# Patient Record
Sex: MALE | Race: WHITE | NOT HISPANIC OR LATINO | Employment: FULL TIME | RURAL
[De-identification: names, ages, dates, MRNs, and addresses within clinical notes are randomized per-mention and may not be internally consistent; named-entity substitution may affect disease eponyms.]

---

## 2017-10-11 ENCOUNTER — HISTORICAL (OUTPATIENT)
Dept: ADMINISTRATIVE | Facility: HOSPITAL | Age: 52
End: 2017-10-11

## 2017-10-13 LAB
LAB AP CLINICAL INFORMATION: NORMAL
LAB AP COMMENTS: NORMAL
LAB AP DIAGNOSIS - HISTORICAL: NORMAL
LAB AP GROSS PATHOLOGY - HISTORICAL: NORMAL
LAB AP SPECIMEN SUBMITTED - HISTORICAL: NORMAL

## 2018-12-21 ENCOUNTER — HISTORICAL (OUTPATIENT)
Dept: ADMINISTRATIVE | Facility: HOSPITAL | Age: 53
End: 2018-12-21

## 2018-12-24 LAB
LAB AP CLINICAL INFORMATION: NORMAL
LAB AP DIAGNOSIS - HISTORICAL: NORMAL
LAB AP GROSS PATHOLOGY - HISTORICAL: NORMAL
LAB AP SPECIMEN SUBMITTED - HISTORICAL: NORMAL

## 2020-03-17 ENCOUNTER — HISTORICAL (OUTPATIENT)
Dept: ADMINISTRATIVE | Facility: HOSPITAL | Age: 55
End: 2020-03-17

## 2020-05-21 ENCOUNTER — HISTORICAL (OUTPATIENT)
Dept: ADMINISTRATIVE | Facility: HOSPITAL | Age: 55
End: 2020-05-21

## 2020-05-21 LAB
ABO: NORMAL
ANTIBODY SCREEN: NORMAL
BUN SERPL-MCNC: 13 MG/DL (ref 7–18)
CALCIUM SERPL-MCNC: 8.1 MG/DL (ref 8.5–10.1)
CHLORIDE SERPL-SCNC: 105 MMOL/L (ref 98–107)
CO2 SERPL-SCNC: 26 MMOL/L (ref 21–32)
CREAT SERPL-MCNC: 0.95 MG/DL (ref 0.7–1.3)
GLUCOSE SERPL-MCNC: 146 MG/DL (ref 74–106)
HCT VFR BLD AUTO: 35.9 % (ref 40–54)
HGB BLD-MCNC: 12.6 G/DL (ref 13.5–18)
MCHC RBC AUTO-ENTMCNC: 35.1 G/DL (ref 32–36)
POTASSIUM SERPL-SCNC: 4 MMOL/L (ref 3.5–5.1)
RH TYPE: NORMAL
SODIUM SERPL-SCNC: 138 MMOL/L (ref 136–145)

## 2020-05-22 ENCOUNTER — HISTORICAL (OUTPATIENT)
Dept: ADMINISTRATIVE | Facility: HOSPITAL | Age: 55
End: 2020-05-22

## 2020-05-22 LAB
BASOPHILS # BLD AUTO: 0.01 X10E3/UL (ref 0–0.2)
BASOPHILS NFR BLD AUTO: 0.1 % (ref 0–1)
BUN SERPL-MCNC: 12 MG/DL (ref 7–18)
CALCIUM SERPL-MCNC: 8 MG/DL (ref 8.5–10.1)
CHLORIDE SERPL-SCNC: 104 MMOL/L (ref 98–107)
CO2 SERPL-SCNC: 27 MMOL/L (ref 21–32)
CREAT SERPL-MCNC: 0.92 MG/DL (ref 0.7–1.3)
EOSINOPHIL # BLD AUTO: 0 X10E3/UL (ref 0–0.5)
EOSINOPHIL NFR BLD AUTO: 0 % (ref 1–4)
ERYTHROCYTE [DISTWIDTH] IN BLOOD BY AUTOMATED COUNT: 12.5 % (ref 11.5–14.5)
GLUCOSE SERPL-MCNC: 129 MG/DL (ref 74–106)
HCT VFR BLD AUTO: 36.9 % (ref 40–54)
HGB BLD-MCNC: 12.3 G/DL (ref 13.5–18)
IMM GRANULOCYTES # BLD AUTO: 0.04 X10E3/UL (ref 0–0.04)
IMM GRANULOCYTES NFR BLD: 0.3 % (ref 0–0.4)
LYMPHOCYTES # BLD AUTO: 0.89 X10E3/UL (ref 1–4.8)
LYMPHOCYTES NFR BLD AUTO: 7.6 % (ref 27–41)
MCH RBC QN AUTO: 31.9 PG (ref 27–31)
MCHC RBC AUTO-ENTMCNC: 33.3 G/DL (ref 32–36)
MCV RBC AUTO: 95.6 FL (ref 80–96)
MONOCYTES # BLD AUTO: 0.88 X10E3/UL (ref 0–0.8)
MONOCYTES NFR BLD AUTO: 7.5 % (ref 2–6)
MPC BLD CALC-MCNC: 10 FL (ref 9.4–12.4)
NEUTROPHILS # BLD AUTO: 9.88 X10E3/UL (ref 1.8–7.7)
NEUTROPHILS NFR BLD AUTO: 84.5 % (ref 53–65)
NRBC # BLD AUTO: 0 X10E3/UL (ref 0–0)
NRBC, AUTO (.00): 0 /100 (ref 0–0)
PLATELET # BLD AUTO: 202 X10E3/UL (ref 150–400)
POTASSIUM SERPL-SCNC: 4.2 MMOL/L (ref 3.5–5.1)
RBC # BLD AUTO: 3.86 X10E6/UL (ref 4.6–6.2)
SODIUM SERPL-SCNC: 138 MMOL/L (ref 136–145)
WBC # BLD AUTO: 11.7 X10E3/UL (ref 4.5–11)

## 2020-05-23 ENCOUNTER — HISTORICAL (OUTPATIENT)
Dept: ADMINISTRATIVE | Facility: HOSPITAL | Age: 55
End: 2020-05-23

## 2020-05-23 LAB
BASOPHILS # BLD AUTO: 0.04 X10E3/UL (ref 0–0.2)
BASOPHILS NFR BLD AUTO: 0.6 % (ref 0–1)
BUN SERPL-MCNC: 14 MG/DL (ref 7–18)
CALCIUM SERPL-MCNC: 7.9 MG/DL (ref 8.5–10.1)
CHLORIDE SERPL-SCNC: 104 MMOL/L (ref 98–107)
CO2 SERPL-SCNC: 28 MMOL/L (ref 21–32)
CREAT SERPL-MCNC: 0.82 MG/DL (ref 0.7–1.3)
EOSINOPHIL # BLD AUTO: 0.16 X10E3/UL (ref 0–0.5)
EOSINOPHIL NFR BLD AUTO: 2.3 % (ref 1–4)
ERYTHROCYTE [DISTWIDTH] IN BLOOD BY AUTOMATED COUNT: 12.8 % (ref 11.5–14.5)
GLUCOSE SERPL-MCNC: 114 MG/DL (ref 74–106)
HCT VFR BLD AUTO: 34.1 % (ref 40–54)
HGB BLD-MCNC: 11.1 G/DL (ref 13.5–18)
IMM GRANULOCYTES # BLD AUTO: 0.02 X10E3/UL (ref 0–0.04)
IMM GRANULOCYTES NFR BLD: 0.3 % (ref 0–0.4)
LYMPHOCYTES # BLD AUTO: 1.45 X10E3/UL (ref 1–4.8)
LYMPHOCYTES NFR BLD AUTO: 21.2 % (ref 27–41)
MCH RBC QN AUTO: 31.7 PG (ref 27–31)
MCHC RBC AUTO-ENTMCNC: 32.6 G/DL (ref 32–36)
MCV RBC AUTO: 97.4 FL (ref 80–96)
MONOCYTES # BLD AUTO: 0.79 X10E3/UL (ref 0–0.8)
MONOCYTES NFR BLD AUTO: 11.6 % (ref 2–6)
MPC BLD CALC-MCNC: 9.9 FL (ref 9.4–12.4)
NEUTROPHILS # BLD AUTO: 4.37 X10E3/UL (ref 1.8–7.7)
NEUTROPHILS NFR BLD AUTO: 64 % (ref 53–65)
NRBC # BLD AUTO: 0 X10E3/UL (ref 0–0)
NRBC, AUTO (.00): 0 /100 (ref 0–0)
PLATELET # BLD AUTO: 165 X10E3/UL (ref 150–400)
POTASSIUM SERPL-SCNC: 3.8 MMOL/L (ref 3.5–5.1)
RBC # BLD AUTO: 3.5 X10E6/UL (ref 4.6–6.2)
SODIUM SERPL-SCNC: 136 MMOL/L (ref 136–145)
WBC # BLD AUTO: 6.83 X10E3/UL (ref 4.5–11)

## 2020-07-13 ENCOUNTER — HISTORICAL (OUTPATIENT)
Dept: ADMINISTRATIVE | Facility: HOSPITAL | Age: 55
End: 2020-07-13

## 2020-09-04 ENCOUNTER — HISTORICAL (OUTPATIENT)
Dept: ADMINISTRATIVE | Facility: HOSPITAL | Age: 55
End: 2020-09-04

## 2020-10-05 ENCOUNTER — HISTORICAL (OUTPATIENT)
Dept: ADMINISTRATIVE | Facility: HOSPITAL | Age: 55
End: 2020-10-05

## 2020-10-05 LAB
ALBUMIN SERPL BCP-MCNC: 3.5 G/DL (ref 3.5–5)
ALBUMIN/GLOB SERPL: 1.2 {RATIO}
ALP SERPL-CCNC: 71 U/L (ref 45–115)
ALT SERPL W P-5'-P-CCNC: 29 U/L (ref 16–61)
ANION GAP SERPL CALCULATED.3IONS-SCNC: 6 MMOL/L (ref 7–16)
AST SERPL W P-5'-P-CCNC: 18 U/L (ref 15–37)
BASOPHILS # BLD AUTO: 0.07 X10E3/UL (ref 0–0.2)
BASOPHILS NFR BLD AUTO: 1.3 % (ref 0–1)
BILIRUB SERPL-MCNC: 0.4 MG/DL (ref 0–1.2)
BUN SERPL-MCNC: 13 MG/DL (ref 7–18)
BUN/CREAT SERPL: 16
CALCIUM SERPL-MCNC: 8.7 MG/DL (ref 8.5–10.1)
CHLORIDE SERPL-SCNC: 110 MMOL/L (ref 98–107)
CO2 SERPL-SCNC: 30 MMOL/L (ref 21–32)
CREAT SERPL-MCNC: 0.8 MG/DL (ref 0.7–1.3)
EOSINOPHIL # BLD AUTO: 0.62 X10E3/UL (ref 0–0.5)
EOSINOPHIL NFR BLD AUTO: 11.6 % (ref 1–4)
ERYTHROCYTE [DISTWIDTH] IN BLOOD BY AUTOMATED COUNT: 13.5 % (ref 11.5–14.5)
GLOBULIN SER-MCNC: 3 G/DL (ref 2–4)
GLUCOSE SERPL-MCNC: 94 MG/DL (ref 74–106)
HCT VFR BLD AUTO: 37.1 % (ref 40–54)
HGB BLD-MCNC: 12.6 G/DL (ref 13.5–18)
IMM GRANULOCYTES # BLD AUTO: 0.01 X10E3/UL (ref 0–0.04)
IMM GRANULOCYTES NFR BLD: 0.2 % (ref 0–0.4)
LYMPHOCYTES # BLD AUTO: 2.17 X10E3/UL (ref 1–4.8)
LYMPHOCYTES NFR BLD AUTO: 40.6 % (ref 27–41)
MCH RBC QN AUTO: 31.6 PG (ref 27–31)
MCHC RBC AUTO-ENTMCNC: 34 G/DL (ref 32–36)
MCV RBC AUTO: 93 FL (ref 80–96)
MONOCYTES # BLD AUTO: 0.69 X10E3/UL (ref 0–0.8)
MONOCYTES NFR BLD AUTO: 12.9 % (ref 2–6)
MPC BLD CALC-MCNC: 10.2 FL (ref 9.4–12.4)
NEUTROPHILS # BLD AUTO: 1.79 X10E3/UL (ref 1.8–7.7)
NEUTROPHILS NFR BLD AUTO: 33.4 % (ref 53–65)
NRBC # BLD AUTO: 0 X10E3/UL (ref 0–0)
NRBC, AUTO (.00): 0 /100 (ref 0–0)
PLATELET # BLD AUTO: 241 X10E3/UL (ref 150–400)
POTASSIUM SERPL-SCNC: 4.1 MMOL/L (ref 3.5–5.1)
PROT SERPL-MCNC: 6.5 G/DL (ref 6.4–8.2)
RBC # BLD AUTO: 3.99 X10E6/UL (ref 4.6–6.2)
SARS-COV+SARS-COV-2 AG RESP QL IA.RAPID: NEGATIVE
SODIUM SERPL-SCNC: 142 MMOL/L (ref 136–145)
WBC # BLD AUTO: 5.35 X10E3/UL (ref 4.5–11)

## 2020-10-23 ENCOUNTER — HISTORICAL (OUTPATIENT)
Dept: ADMINISTRATIVE | Facility: HOSPITAL | Age: 55
End: 2020-10-23

## 2021-01-12 ENCOUNTER — HISTORICAL (OUTPATIENT)
Dept: ADMINISTRATIVE | Facility: HOSPITAL | Age: 56
End: 2021-01-12

## 2021-04-27 DIAGNOSIS — I25.10 CORONARY ARTERY DISEASE INVOLVING NATIVE CORONARY ARTERY OF NATIVE HEART WITHOUT ANGINA PECTORIS: Primary | ICD-10-CM

## 2021-04-27 RX ORDER — BUTALBITAL, ACETAMINOPHEN AND CAFFEINE 50; 325; 40 MG/1; MG/1; MG/1
TABLET ORAL
Status: ON HOLD | COMMUNITY
Start: 2021-01-27 | End: 2021-10-05

## 2021-04-27 RX ORDER — RIVAROXABAN 20 MG/1
1 TABLET, FILM COATED ORAL DAILY
Qty: 90 TABLET | Refills: 1 | Status: SHIPPED | OUTPATIENT
Start: 2021-04-27 | End: 2021-08-18 | Stop reason: SDUPTHER

## 2021-04-27 RX ORDER — DEXTROAMPHETAMINE SACCHARATE, AMPHETAMINE ASPARTATE, DEXTROAMPHETAMINE SULFATE AND AMPHETAMINE SULFATE 5; 5; 5; 5 MG/1; MG/1; MG/1; MG/1
1 TABLET ORAL 2 TIMES DAILY
COMMUNITY
Start: 2021-03-29

## 2021-04-27 RX ORDER — ESOMEPRAZOLE MAGNESIUM 40 MG/1
CAPSULE, DELAYED RELEASE ORAL
COMMUNITY
Start: 2021-01-27 | End: 2021-08-18 | Stop reason: SDUPTHER

## 2021-04-27 RX ORDER — PREGABALIN 150 MG/1
CAPSULE ORAL
COMMUNITY
Start: 2021-03-11

## 2021-04-27 RX ORDER — RIVAROXABAN 20 MG/1
1 TABLET, FILM COATED ORAL DAILY
COMMUNITY
Start: 2021-02-02 | End: 2021-04-27 | Stop reason: SDUPTHER

## 2021-04-27 RX ORDER — LISINOPRIL 10 MG/1
TABLET ORAL
COMMUNITY
Start: 2021-01-27 | End: 2021-06-14 | Stop reason: SDUPTHER

## 2021-04-27 RX ORDER — HYDROCODONE BITARTRATE AND ACETAMINOPHEN 10; 325 MG/1; MG/1
TABLET ORAL
COMMUNITY
Start: 2021-04-20 | End: 2021-10-19

## 2021-04-27 RX ORDER — CLINDAMYCIN PHOSPHATE 10 MG/G
GEL TOPICAL
Status: ON HOLD | COMMUNITY
Start: 2021-04-19 | End: 2021-10-05

## 2021-04-27 RX ORDER — TIZANIDINE 4 MG/1
TABLET ORAL
COMMUNITY
Start: 2021-01-27 | End: 2022-10-20

## 2021-04-27 RX ORDER — BACLOFEN 10 MG/1
TABLET ORAL
COMMUNITY
Start: 2021-04-16

## 2021-04-27 RX ORDER — TADALAFIL 20 MG/1
TABLET ORAL
COMMUNITY
Start: 2021-03-08 | End: 2021-11-04 | Stop reason: SDUPTHER

## 2021-06-14 ENCOUNTER — HOSPITAL ENCOUNTER (OUTPATIENT)
Dept: RADIOLOGY | Facility: HOSPITAL | Age: 56
Discharge: HOME | End: 2021-06-14
Attending: ORTHOPAEDIC SURGERY
Payer: COMMERCIAL

## 2021-06-14 DIAGNOSIS — M25.552 BILATERAL HIP PAIN: ICD-10-CM

## 2021-06-14 DIAGNOSIS — M25.551 BILATERAL HIP PAIN: ICD-10-CM

## 2021-06-14 DIAGNOSIS — I10 HYPERTENSION, UNSPECIFIED TYPE: Primary | ICD-10-CM

## 2021-06-14 PROBLEM — M16.12 ARTHRITIS OF LEFT HIP: Status: ACTIVE | Noted: 2021-06-14

## 2021-06-14 PROBLEM — M16.11 ARTHRITIS OF RIGHT HIP: Status: ACTIVE | Noted: 2021-06-14

## 2021-06-14 PROCEDURE — 73521 XR HIPS BILATERAL 2 VIEW INCL AP PELVIS: ICD-10-PCS | Mod: 26,,, | Performed by: RADIOLOGY

## 2021-06-14 PROCEDURE — 73521 X-RAY EXAM HIPS BI 2 VIEWS: CPT | Mod: TC

## 2021-06-14 PROCEDURE — 73521 X-RAY EXAM HIPS BI 2 VIEWS: CPT | Mod: 26,,, | Performed by: RADIOLOGY

## 2021-06-14 RX ORDER — LISINOPRIL 10 MG/1
10 TABLET ORAL DAILY
Qty: 30 TABLET | Refills: 0 | Status: SHIPPED | OUTPATIENT
Start: 2021-06-14 | End: 2021-08-18 | Stop reason: SDUPTHER

## 2021-08-04 ENCOUNTER — OFFICE VISIT (OUTPATIENT)
Dept: SPINE | Facility: CLINIC | Age: 56
End: 2021-08-04
Payer: COMMERCIAL

## 2021-08-04 VITALS — WEIGHT: 205 LBS | HEIGHT: 69 IN | BODY MASS INDEX: 30.36 KG/M2

## 2021-08-04 DIAGNOSIS — M48.062 LUMBAR STENOSIS WITH NEUROGENIC CLAUDICATION: ICD-10-CM

## 2021-08-04 DIAGNOSIS — M54.16 LUMBAR RADICULOPATHY: ICD-10-CM

## 2021-08-04 DIAGNOSIS — M51.36 DDD (DEGENERATIVE DISC DISEASE), LUMBAR: ICD-10-CM

## 2021-08-04 DIAGNOSIS — M43.16 SPONDYLOLISTHESIS, LUMBAR REGION: Primary | ICD-10-CM

## 2021-08-04 DIAGNOSIS — M54.9 DORSALGIA, UNSPECIFIED: ICD-10-CM

## 2021-08-04 PROCEDURE — 99214 OFFICE O/P EST MOD 30 MIN: CPT | Mod: PBBFAC | Performed by: ORTHOPAEDIC SURGERY

## 2021-08-04 PROCEDURE — 99214 OFFICE O/P EST MOD 30 MIN: CPT | Mod: S$PBB,,, | Performed by: ORTHOPAEDIC SURGERY

## 2021-08-04 PROCEDURE — 99214 PR OFFICE/OUTPT VISIT, EST, LEVL IV, 30-39 MIN: ICD-10-PCS | Mod: S$PBB,,, | Performed by: ORTHOPAEDIC SURGERY

## 2021-08-06 ENCOUNTER — OFFICE VISIT (OUTPATIENT)
Dept: SPINE | Facility: CLINIC | Age: 56
End: 2021-08-06
Payer: COMMERCIAL

## 2021-08-06 DIAGNOSIS — M51.36 DDD (DEGENERATIVE DISC DISEASE), LUMBAR: ICD-10-CM

## 2021-08-06 DIAGNOSIS — M48.062 LUMBAR STENOSIS WITH NEUROGENIC CLAUDICATION: ICD-10-CM

## 2021-08-06 DIAGNOSIS — M54.16 LUMBAR RADICULOPATHY: ICD-10-CM

## 2021-08-06 DIAGNOSIS — M43.16 SPONDYLOLISTHESIS, LUMBAR REGION: Primary | ICD-10-CM

## 2021-08-06 PROCEDURE — 99213 OFFICE O/P EST LOW 20 MIN: CPT | Mod: PBBFAC | Performed by: ORTHOPAEDIC SURGERY

## 2021-08-06 PROCEDURE — 99214 OFFICE O/P EST MOD 30 MIN: CPT | Mod: S$PBB,,, | Performed by: ORTHOPAEDIC SURGERY

## 2021-08-06 PROCEDURE — 99214 PR OFFICE/OUTPT VISIT, EST, LEVL IV, 30-39 MIN: ICD-10-PCS | Mod: S$PBB,,, | Performed by: ORTHOPAEDIC SURGERY

## 2021-08-18 ENCOUNTER — OFFICE VISIT (OUTPATIENT)
Dept: FAMILY MEDICINE | Facility: CLINIC | Age: 56
End: 2021-08-18
Payer: COMMERCIAL

## 2021-08-18 VITALS
DIASTOLIC BLOOD PRESSURE: 85 MMHG | OXYGEN SATURATION: 96 % | HEIGHT: 69 IN | HEART RATE: 91 BPM | BODY MASS INDEX: 31.22 KG/M2 | WEIGHT: 210.81 LBS | RESPIRATION RATE: 18 BRPM | SYSTOLIC BLOOD PRESSURE: 147 MMHG

## 2021-08-18 DIAGNOSIS — I25.10 CORONARY ARTERY DISEASE INVOLVING NATIVE CORONARY ARTERY OF NATIVE HEART WITHOUT ANGINA PECTORIS: ICD-10-CM

## 2021-08-18 DIAGNOSIS — I10 HYPERTENSION, UNSPECIFIED TYPE: ICD-10-CM

## 2021-08-18 PROCEDURE — 99203 OFFICE O/P NEW LOW 30 MIN: CPT | Mod: ,,, | Performed by: INTERNAL MEDICINE

## 2021-08-18 PROCEDURE — 99203 PR OFFICE/OUTPT VISIT, NEW, LEVL III, 30-44 MIN: ICD-10-PCS | Mod: ,,, | Performed by: INTERNAL MEDICINE

## 2021-08-18 RX ORDER — FUROSEMIDE 20 MG/1
20 TABLET ORAL DAILY
COMMUNITY
End: 2021-08-18 | Stop reason: SDUPTHER

## 2021-08-24 DIAGNOSIS — M43.16 SPONDYLOLISTHESIS, LUMBAR REGION: Primary | ICD-10-CM

## 2021-08-24 RX ORDER — RIVAROXABAN 20 MG/1
20 TABLET, FILM COATED ORAL DAILY
Qty: 90 TABLET | Refills: 1 | Status: SHIPPED | OUTPATIENT
Start: 2021-08-24 | End: 2021-09-21

## 2021-08-24 RX ORDER — FUROSEMIDE 20 MG/1
20 TABLET ORAL DAILY
Qty: 90 TABLET | Refills: 1 | Status: SHIPPED | OUTPATIENT
Start: 2021-08-24 | End: 2022-01-24

## 2021-08-24 RX ORDER — ESOMEPRAZOLE MAGNESIUM 40 MG/1
40 CAPSULE, DELAYED RELEASE ORAL
Qty: 90 CAPSULE | Refills: 1 | Status: SHIPPED | OUTPATIENT
Start: 2021-08-24 | End: 2022-01-18

## 2021-08-24 RX ORDER — LISINOPRIL 10 MG/1
10 TABLET ORAL DAILY
Qty: 90 TABLET | Refills: 1 | Status: SHIPPED | OUTPATIENT
Start: 2021-08-24 | End: 2022-11-09

## 2021-08-24 RX ORDER — SODIUM CHLORIDE 9 MG/ML
INJECTION, SOLUTION INTRAVENOUS CONTINUOUS
Status: CANCELLED | OUTPATIENT
Start: 2021-08-24

## 2021-08-24 RX ORDER — CLINDAMYCIN HYDROCHLORIDE 150 MG/1
150 CAPSULE ORAL 3 TIMES DAILY
Qty: 21 CAPSULE | Refills: 1 | Status: ON HOLD | OUTPATIENT
Start: 2021-08-24 | End: 2021-10-05

## 2021-09-07 ENCOUNTER — OFFICE VISIT (OUTPATIENT)
Dept: CARDIOLOGY | Facility: CLINIC | Age: 56
End: 2021-09-07
Payer: COMMERCIAL

## 2021-09-07 VITALS
DIASTOLIC BLOOD PRESSURE: 60 MMHG | BODY MASS INDEX: 31.1 KG/M2 | WEIGHT: 210 LBS | HEIGHT: 69 IN | SYSTOLIC BLOOD PRESSURE: 104 MMHG | HEART RATE: 79 BPM | OXYGEN SATURATION: 97 %

## 2021-09-07 DIAGNOSIS — I10 HYPERTENSION, UNSPECIFIED TYPE: Primary | ICD-10-CM

## 2021-09-07 PROCEDURE — 93010 ELECTROCARDIOGRAM REPORT: CPT | Mod: S$PBB,,, | Performed by: INTERNAL MEDICINE

## 2021-09-07 PROCEDURE — 99214 PR OFFICE/OUTPT VISIT, EST, LEVL IV, 30-39 MIN: ICD-10-PCS | Mod: S$PBB,,, | Performed by: INTERNAL MEDICINE

## 2021-09-07 PROCEDURE — 99214 OFFICE O/P EST MOD 30 MIN: CPT | Mod: PBBFAC | Performed by: INTERNAL MEDICINE

## 2021-09-07 PROCEDURE — 99214 OFFICE O/P EST MOD 30 MIN: CPT | Mod: S$PBB,,, | Performed by: INTERNAL MEDICINE

## 2021-09-07 PROCEDURE — 93005 ELECTROCARDIOGRAM TRACING: CPT | Mod: PBBFAC | Performed by: INTERNAL MEDICINE

## 2021-09-07 PROCEDURE — 93010 EKG 12-LEAD: ICD-10-PCS | Mod: S$PBB,,, | Performed by: INTERNAL MEDICINE

## 2021-09-17 ENCOUNTER — OFFICE VISIT (OUTPATIENT)
Dept: DERMATOLOGY | Facility: CLINIC | Age: 56
End: 2021-09-17
Payer: COMMERCIAL

## 2021-09-17 VITALS — HEIGHT: 69 IN | BODY MASS INDEX: 31.1 KG/M2 | WEIGHT: 210 LBS | RESPIRATION RATE: 18 BRPM

## 2021-09-17 DIAGNOSIS — L01.00 IMPETIGO: ICD-10-CM

## 2021-09-17 DIAGNOSIS — F42.4 SKIN-PICKING DISORDER: ICD-10-CM

## 2021-09-17 DIAGNOSIS — D48.5 NEOPLASM OF UNCERTAIN BEHAVIOR OF SKIN: ICD-10-CM

## 2021-09-17 DIAGNOSIS — L28.1 PRURIGO NODULARIS: Primary | ICD-10-CM

## 2021-09-17 DIAGNOSIS — L30.8 ASTEATOTIC ECZEMA: ICD-10-CM

## 2021-09-17 DIAGNOSIS — L21.9 SEBORRHEIC DERMATITIS: ICD-10-CM

## 2021-09-17 PROCEDURE — 87077 CULTURE AEROBIC IDENTIFY: CPT | Mod: ,,, | Performed by: CLINICAL MEDICAL LABORATORY

## 2021-09-17 PROCEDURE — 87186 SC STD MICRODIL/AGAR DIL: CPT | Mod: ,,, | Performed by: CLINICAL MEDICAL LABORATORY

## 2021-09-17 PROCEDURE — 87070 CULTURE OTHR SPECIMN AEROBIC: CPT | Mod: ,,, | Performed by: CLINICAL MEDICAL LABORATORY

## 2021-09-17 PROCEDURE — 99204 PR OFFICE/OUTPT VISIT, NEW, LEVL IV, 45-59 MIN: ICD-10-PCS | Mod: ,,, | Performed by: STUDENT IN AN ORGANIZED HEALTH CARE EDUCATION/TRAINING PROGRAM

## 2021-09-17 PROCEDURE — 87186 CULTURE, WOUND: ICD-10-PCS | Mod: ,,, | Performed by: CLINICAL MEDICAL LABORATORY

## 2021-09-17 PROCEDURE — 87077 CULTURE, WOUND: ICD-10-PCS | Mod: ,,, | Performed by: CLINICAL MEDICAL LABORATORY

## 2021-09-17 PROCEDURE — 99204 OFFICE O/P NEW MOD 45 MIN: CPT | Mod: ,,, | Performed by: STUDENT IN AN ORGANIZED HEALTH CARE EDUCATION/TRAINING PROGRAM

## 2021-09-17 PROCEDURE — 87070 CULTURE, WOUND: ICD-10-PCS | Mod: ,,, | Performed by: CLINICAL MEDICAL LABORATORY

## 2021-09-17 RX ORDER — TRIAMCINOLONE ACETONIDE 1 MG/G
OINTMENT TOPICAL 2 TIMES DAILY
Qty: 80 G | Refills: 5 | Status: SHIPPED | OUTPATIENT
Start: 2021-09-17 | End: 2022-01-24 | Stop reason: SDUPTHER

## 2021-09-17 RX ORDER — KETOCONAZOLE 20 MG/G
CREAM TOPICAL DAILY
Qty: 30 G | Refills: 5 | Status: SHIPPED | OUTPATIENT
Start: 2021-09-17 | End: 2022-11-09

## 2021-09-17 RX ORDER — ACETYLCYSTEINE 600 MG
600 CAPSULE ORAL 3 TIMES DAILY
Qty: 90 CAPSULE | Refills: 5 | Status: ON HOLD | OUTPATIENT
Start: 2021-09-17 | End: 2021-10-05

## 2021-09-19 LAB — MICROORGANISM SPEC CULT: ABNORMAL

## 2021-09-21 ENCOUNTER — OFFICE VISIT (OUTPATIENT)
Dept: PULMONOLOGY | Facility: CLINIC | Age: 56
End: 2021-09-21
Payer: COMMERCIAL

## 2021-09-21 VITALS
WEIGHT: 210 LBS | OXYGEN SATURATION: 96 % | DIASTOLIC BLOOD PRESSURE: 74 MMHG | HEIGHT: 69 IN | BODY MASS INDEX: 31.1 KG/M2 | HEART RATE: 76 BPM | SYSTOLIC BLOOD PRESSURE: 138 MMHG | RESPIRATION RATE: 20 BRPM

## 2021-09-21 DIAGNOSIS — K21.9 GASTROESOPHAGEAL REFLUX DISEASE, UNSPECIFIED WHETHER ESOPHAGITIS PRESENT: ICD-10-CM

## 2021-09-21 DIAGNOSIS — I10 ESSENTIAL HYPERTENSION: ICD-10-CM

## 2021-09-21 DIAGNOSIS — Z01.818 PREOP EXAMINATION: Primary | ICD-10-CM

## 2021-09-21 DIAGNOSIS — I87.2 VENOUS INSUFFICIENCY: ICD-10-CM

## 2021-09-21 PROCEDURE — 99204 PR OFFICE/OUTPT VISIT, NEW, LEVL IV, 45-59 MIN: ICD-10-PCS | Mod: S$PBB,,, | Performed by: INTERNAL MEDICINE

## 2021-09-21 PROCEDURE — 99215 OFFICE O/P EST HI 40 MIN: CPT | Mod: PBBFAC | Performed by: INTERNAL MEDICINE

## 2021-09-21 PROCEDURE — 99204 OFFICE O/P NEW MOD 45 MIN: CPT | Mod: S$PBB,,, | Performed by: INTERNAL MEDICINE

## 2021-09-21 RX ORDER — RIFAMPIN 300 MG/1
300 CAPSULE ORAL EVERY 12 HOURS
Qty: 14 CAPSULE | Refills: 0 | Status: SHIPPED | OUTPATIENT
Start: 2021-09-21 | End: 2022-07-25 | Stop reason: SDUPTHER

## 2021-09-29 ENCOUNTER — OFFICE VISIT (OUTPATIENT)
Dept: SURGERY | Facility: CLINIC | Age: 56
End: 2021-09-29
Payer: COMMERCIAL

## 2021-09-29 VITALS — WEIGHT: 205 LBS | HEIGHT: 71 IN | BODY MASS INDEX: 28.7 KG/M2

## 2021-09-29 DIAGNOSIS — M54.50 LOW BACK PAIN, UNSPECIFIED BACK PAIN LATERALITY, UNSPECIFIED CHRONICITY, UNSPECIFIED WHETHER SCIATICA PRESENT: Primary | ICD-10-CM

## 2021-09-29 PROCEDURE — 99203 OFFICE O/P NEW LOW 30 MIN: CPT | Mod: S$PBB,,, | Performed by: SURGERY

## 2021-09-29 PROCEDURE — 99214 OFFICE O/P EST MOD 30 MIN: CPT | Mod: PBBFAC | Performed by: SURGERY

## 2021-09-29 PROCEDURE — 99203 PR OFFICE/OUTPT VISIT, NEW, LEVL III, 30-44 MIN: ICD-10-PCS | Mod: S$PBB,,, | Performed by: SURGERY

## 2021-09-30 DIAGNOSIS — M54.9 DORSALGIA, UNSPECIFIED: ICD-10-CM

## 2021-10-04 ENCOUNTER — OFFICE VISIT (OUTPATIENT)
Dept: SPINE | Facility: CLINIC | Age: 56
End: 2021-10-04
Payer: COMMERCIAL

## 2021-10-04 DIAGNOSIS — M43.16 SPONDYLOLISTHESIS, LUMBAR REGION: Primary | ICD-10-CM

## 2021-10-04 DIAGNOSIS — M51.36 DDD (DEGENERATIVE DISC DISEASE), LUMBAR: ICD-10-CM

## 2021-10-04 DIAGNOSIS — M54.16 LUMBAR RADICULOPATHY: ICD-10-CM

## 2021-10-04 PROCEDURE — 99213 OFFICE O/P EST LOW 20 MIN: CPT | Mod: PBBFAC | Performed by: ORTHOPAEDIC SURGERY

## 2021-10-04 PROCEDURE — 85730 THROMBOPLASTIN TIME PARTIAL: CPT | Performed by: ORTHOPAEDIC SURGERY

## 2021-10-04 PROCEDURE — 99215 OFFICE O/P EST HI 40 MIN: CPT | Mod: 57,S$PBB,, | Performed by: ORTHOPAEDIC SURGERY

## 2021-10-04 PROCEDURE — 99215 PR OFFICE/OUTPT VISIT, EST, LEVL V, 40-54 MIN: ICD-10-PCS | Mod: 57,S$PBB,, | Performed by: ORTHOPAEDIC SURGERY

## 2021-10-04 PROCEDURE — 85610 PROTHROMBIN TIME: CPT | Mod: 91 | Performed by: ORTHOPAEDIC SURGERY

## 2021-10-04 PROCEDURE — 85610 PROTHROMBIN TIME: CPT | Performed by: ORTHOPAEDIC SURGERY

## 2021-10-04 RX ORDER — CYCLOBENZAPRINE HCL 5 MG
5 TABLET ORAL 3 TIMES DAILY PRN
Qty: 45 TABLET | Refills: 0 | Status: SHIPPED | OUTPATIENT
Start: 2021-10-04 | End: 2021-10-14

## 2021-10-04 RX ORDER — OXYCODONE AND ACETAMINOPHEN 5; 325 MG/1; MG/1
1 TABLET ORAL EVERY 4 HOURS PRN
Qty: 45 TABLET | Refills: 0 | Status: SHIPPED | OUTPATIENT
Start: 2021-10-04 | End: 2021-10-19

## 2021-10-04 RX ORDER — PROMETHAZINE HYDROCHLORIDE 25 MG/1
25 TABLET ORAL EVERY 4 HOURS
Qty: 45 TABLET | Refills: 0 | Status: SHIPPED | OUTPATIENT
Start: 2021-10-04 | End: 2022-11-09

## 2021-10-05 ENCOUNTER — ANESTHESIA EVENT (OUTPATIENT)
Dept: SURGERY | Facility: HOSPITAL | Age: 56
DRG: 460 | End: 2021-10-05
Payer: COMMERCIAL

## 2021-10-05 ENCOUNTER — ANESTHESIA (OUTPATIENT)
Dept: SURGERY | Facility: HOSPITAL | Age: 56
DRG: 460 | End: 2021-10-05
Payer: COMMERCIAL

## 2021-10-05 ENCOUNTER — HOSPITAL ENCOUNTER (INPATIENT)
Facility: HOSPITAL | Age: 56
LOS: 1 days | Discharge: HOME | DRG: 460 | End: 2021-10-06
Attending: ORTHOPAEDIC SURGERY | Admitting: ORTHOPAEDIC SURGERY
Payer: COMMERCIAL

## 2021-10-05 DIAGNOSIS — M43.16 SPONDYLOLISTHESIS, LUMBAR REGION: ICD-10-CM

## 2021-10-05 PROBLEM — Z98.1 S/P LUMBAR FUSION: Status: ACTIVE | Noted: 2021-10-05

## 2021-10-05 PROBLEM — I73.9 PVD (PERIPHERAL VASCULAR DISEASE): Chronic | Status: ACTIVE | Noted: 2021-10-05

## 2021-10-05 PROCEDURE — 71000033 HC RECOVERY, INTIAL HOUR: Performed by: ORTHOPAEDIC SURGERY

## 2021-10-05 PROCEDURE — 99222 PR INITIAL HOSPITAL CARE,LEVL II: ICD-10-PCS | Mod: ,,, | Performed by: HOSPITALIST

## 2021-10-05 PROCEDURE — 27000509 HC TUBE SALEM SUMP ANY SIZE: Performed by: ANESTHESIOLOGY

## 2021-10-05 PROCEDURE — 27000221 HC OXYGEN, UP TO 24 HOURS

## 2021-10-05 PROCEDURE — 63600175 PHARM REV CODE 636 W HCPCS: Performed by: ANESTHESIOLOGY

## 2021-10-05 PROCEDURE — 64488 PERIPHERAL BLOCK: ICD-10-PCS | Mod: XU,,, | Performed by: ANESTHESIOLOGY

## 2021-10-05 PROCEDURE — 97161 PT EVAL LOW COMPLEX 20 MIN: CPT

## 2021-10-05 PROCEDURE — 37000009 HC ANESTHESIA EA ADD 15 MINS: Performed by: ORTHOPAEDIC SURGERY

## 2021-10-05 PROCEDURE — 99900035 HC TECH TIME PER 15 MIN (STAT)

## 2021-10-05 PROCEDURE — D9220A PRA ANESTHESIA: ICD-10-PCS | Mod: CRNA,,,

## 2021-10-05 PROCEDURE — 20930 SP BONE ALGRFT MORSEL ADD-ON: CPT | Mod: ,,, | Performed by: ORTHOPAEDIC SURGERY

## 2021-10-05 PROCEDURE — C1713 ANCHOR/SCREW BN/BN,TIS/BN: HCPCS | Performed by: ORTHOPAEDIC SURGERY

## 2021-10-05 PROCEDURE — 22853 PR INSERT BIOMECH DEV W/INTERBODY ARTHRODESIS, EA CONTIGUOUS DEFECT: ICD-10-PCS | Mod: ,,, | Performed by: ORTHOPAEDIC SURGERY

## 2021-10-05 PROCEDURE — C1729 CATH, DRAINAGE: HCPCS | Performed by: ORTHOPAEDIC SURGERY

## 2021-10-05 PROCEDURE — 36000711: Performed by: ORTHOPAEDIC SURGERY

## 2021-10-05 PROCEDURE — 99222 1ST HOSP IP/OBS MODERATE 55: CPT | Mod: ,,, | Performed by: HOSPITALIST

## 2021-10-05 PROCEDURE — 27000260 *HC AIRWAY ORAL: Performed by: ANESTHESIOLOGY

## 2021-10-05 PROCEDURE — D9220A PRA ANESTHESIA: Mod: CRNA,,,

## 2021-10-05 PROCEDURE — 22558 PR ARTHRODESIS ANT INTERBODY MIN DISCECTOMY,LUMBAR: ICD-10-PCS | Mod: 62,,, | Performed by: ORTHOPAEDIC SURGERY

## 2021-10-05 PROCEDURE — 22853 INSJ BIOMECHANICAL DEVICE: CPT | Mod: ,,, | Performed by: ORTHOPAEDIC SURGERY

## 2021-10-05 PROCEDURE — D9220A PRA ANESTHESIA: ICD-10-PCS | Mod: ANES,,, | Performed by: ANESTHESIOLOGY

## 2021-10-05 PROCEDURE — 20930 PR ALLOGRAFT FOR SPINE SURGERY ONLY MORSELIZED: ICD-10-PCS | Mod: ,,, | Performed by: ORTHOPAEDIC SURGERY

## 2021-10-05 PROCEDURE — 63600175 PHARM REV CODE 636 W HCPCS: Performed by: ORTHOPAEDIC SURGERY

## 2021-10-05 PROCEDURE — 63600175 PHARM REV CODE 636 W HCPCS

## 2021-10-05 PROCEDURE — 11000001 HC ACUTE MED/SURG PRIVATE ROOM

## 2021-10-05 PROCEDURE — 22558 ARTHRD ANT NTRBD MIN DSC LUM: CPT | Mod: 62,,, | Performed by: ORTHOPAEDIC SURGERY

## 2021-10-05 PROCEDURE — 25000003 PHARM REV CODE 250: Performed by: ORTHOPAEDIC SURGERY

## 2021-10-05 PROCEDURE — 27000689 HC BLADE LARYNGOSCOPE ANY SIZE: Performed by: ANESTHESIOLOGY

## 2021-10-05 PROCEDURE — D9220A PRA ANESTHESIA: Mod: ANES,,, | Performed by: ANESTHESIOLOGY

## 2021-10-05 PROCEDURE — 27000655: Performed by: ANESTHESIOLOGY

## 2021-10-05 PROCEDURE — 94761 N-INVAS EAR/PLS OXIMETRY MLT: CPT

## 2021-10-05 PROCEDURE — 25000003 PHARM REV CODE 250

## 2021-10-05 PROCEDURE — 27000165 HC TUBE, ETT CUFFED: Performed by: ANESTHESIOLOGY

## 2021-10-05 PROCEDURE — 22558 PR ARTHRODESIS ANT INTERBODY MIN DISCECTOMY,LUMBAR: ICD-10-PCS | Mod: 62,,, | Performed by: SURGERY

## 2021-10-05 PROCEDURE — 27100168 OPTIME MED/SURG SUP & DEVICES NON-STERILE SUPPLY: Performed by: ORTHOPAEDIC SURGERY

## 2021-10-05 PROCEDURE — 27201423 OPTIME MED/SURG SUP & DEVICES STERILE SUPPLY: Performed by: ORTHOPAEDIC SURGERY

## 2021-10-05 PROCEDURE — 27000716 HC OXISENSOR PROBE, ANY SIZE: Performed by: ANESTHESIOLOGY

## 2021-10-05 PROCEDURE — 22558 ARTHRD ANT NTRBD MIN DSC LUM: CPT | Mod: 62,,, | Performed by: SURGERY

## 2021-10-05 PROCEDURE — 37000008 HC ANESTHESIA 1ST 15 MINUTES: Performed by: ORTHOPAEDIC SURGERY

## 2021-10-05 PROCEDURE — 36000710: Performed by: ORTHOPAEDIC SURGERY

## 2021-10-05 PROCEDURE — 64488 TAP BLOCK BI INJECTION: CPT | Performed by: ANESTHESIOLOGY

## 2021-10-05 DEVICE — IMPLANTABLE DEVICE: Type: IMPLANTABLE DEVICE | Site: BACK | Status: FUNCTIONAL

## 2021-10-05 RX ORDER — SODIUM CHLORIDE 9 MG/ML
INJECTION, SOLUTION INTRAVENOUS CONTINUOUS
Status: DISCONTINUED | OUTPATIENT
Start: 2021-10-05 | End: 2021-10-05

## 2021-10-05 RX ORDER — DOCUSATE SODIUM 100 MG/1
100 CAPSULE, LIQUID FILLED ORAL 2 TIMES DAILY
Status: DISCONTINUED | OUTPATIENT
Start: 2021-10-05 | End: 2021-10-06 | Stop reason: HOSPADM

## 2021-10-05 RX ORDER — FENTANYL CITRATE 50 UG/ML
INJECTION, SOLUTION INTRAMUSCULAR; INTRAVENOUS
Status: DISCONTINUED | OUTPATIENT
Start: 2021-10-05 | End: 2021-10-05

## 2021-10-05 RX ORDER — LIDOCAINE HYDROCHLORIDE 20 MG/ML
INJECTION, SOLUTION EPIDURAL; INFILTRATION; INTRACAUDAL; PERINEURAL
Status: DISCONTINUED | OUTPATIENT
Start: 2021-10-05 | End: 2021-10-05

## 2021-10-05 RX ORDER — DEXAMETHASONE SODIUM PHOSPHATE 4 MG/ML
INJECTION, SOLUTION INTRA-ARTICULAR; INTRALESIONAL; INTRAMUSCULAR; INTRAVENOUS; SOFT TISSUE
Status: DISCONTINUED | OUTPATIENT
Start: 2021-10-05 | End: 2021-10-05

## 2021-10-05 RX ORDER — MORPHINE SULFATE 10 MG/ML
4 INJECTION INTRAMUSCULAR; INTRAVENOUS; SUBCUTANEOUS EVERY 5 MIN PRN
Status: DISCONTINUED | OUTPATIENT
Start: 2021-10-05 | End: 2021-10-05 | Stop reason: HOSPADM

## 2021-10-05 RX ORDER — OXYCODONE HYDROCHLORIDE 5 MG/1
5 TABLET ORAL EVERY 4 HOURS PRN
Status: DISCONTINUED | OUTPATIENT
Start: 2021-10-05 | End: 2021-10-06 | Stop reason: HOSPADM

## 2021-10-05 RX ORDER — HYDROMORPHONE HYDROCHLORIDE 2 MG/ML
0.5 INJECTION, SOLUTION INTRAMUSCULAR; INTRAVENOUS; SUBCUTANEOUS EVERY 5 MIN PRN
Status: DISCONTINUED | OUTPATIENT
Start: 2021-10-05 | End: 2021-10-05 | Stop reason: HOSPADM

## 2021-10-05 RX ORDER — MEPERIDINE HYDROCHLORIDE 25 MG/ML
25 INJECTION INTRAMUSCULAR; INTRAVENOUS; SUBCUTANEOUS EVERY 10 MIN PRN
Status: DISCONTINUED | OUTPATIENT
Start: 2021-10-05 | End: 2021-10-05 | Stop reason: HOSPADM

## 2021-10-05 RX ORDER — TRANEXAMIC ACID 100 MG/ML
INJECTION, SOLUTION INTRAVENOUS
Status: DISCONTINUED | OUTPATIENT
Start: 2021-10-05 | End: 2021-10-05

## 2021-10-05 RX ORDER — CEFAZOLIN SODIUM 1 G/3ML
INJECTION, POWDER, FOR SOLUTION INTRAMUSCULAR; INTRAVENOUS
Status: DISCONTINUED | OUTPATIENT
Start: 2021-10-05 | End: 2021-10-05

## 2021-10-05 RX ORDER — PANTOPRAZOLE SODIUM 40 MG/1
40 TABLET, DELAYED RELEASE ORAL DAILY
Status: DISCONTINUED | OUTPATIENT
Start: 2021-10-05 | End: 2021-10-06 | Stop reason: HOSPADM

## 2021-10-05 RX ORDER — BACLOFEN 10 MG/1
10 TABLET ORAL 3 TIMES DAILY
Status: DISCONTINUED | OUTPATIENT
Start: 2021-10-05 | End: 2021-10-06 | Stop reason: HOSPADM

## 2021-10-05 RX ORDER — AMOXICILLIN 250 MG
1 CAPSULE ORAL 2 TIMES DAILY
Status: DISCONTINUED | OUTPATIENT
Start: 2021-10-05 | End: 2021-10-06 | Stop reason: HOSPADM

## 2021-10-05 RX ORDER — EPINEPHRINE 1 MG/ML
INJECTION, SOLUTION INTRACARDIAC; INTRAMUSCULAR; INTRAVENOUS; SUBCUTANEOUS
Status: DISCONTINUED | OUTPATIENT
Start: 2021-10-05 | End: 2021-10-05 | Stop reason: HOSPADM

## 2021-10-05 RX ORDER — METOCLOPRAMIDE HYDROCHLORIDE 5 MG/ML
5 INJECTION INTRAMUSCULAR; INTRAVENOUS EVERY 6 HOURS PRN
Status: DISCONTINUED | OUTPATIENT
Start: 2021-10-05 | End: 2021-10-06 | Stop reason: HOSPADM

## 2021-10-05 RX ORDER — VANCOMYCIN HYDROCHLORIDE 1 G/20ML
INJECTION, POWDER, LYOPHILIZED, FOR SOLUTION INTRAVENOUS
Status: DISCONTINUED | OUTPATIENT
Start: 2021-10-05 | End: 2021-10-05 | Stop reason: HOSPADM

## 2021-10-05 RX ORDER — PROPOFOL 10 MG/ML
VIAL (ML) INTRAVENOUS
Status: DISCONTINUED | OUTPATIENT
Start: 2021-10-05 | End: 2021-10-05

## 2021-10-05 RX ORDER — SODIUM CHLORIDE 0.9 % (FLUSH) 0.9 %
10 SYRINGE (ML) INJECTION
Status: DISCONTINUED | OUTPATIENT
Start: 2021-10-05 | End: 2021-10-06 | Stop reason: HOSPADM

## 2021-10-05 RX ORDER — OXYCODONE HCL 10 MG/1
10 TABLET, FILM COATED, EXTENDED RELEASE ORAL ONCE
Status: COMPLETED | OUTPATIENT
Start: 2021-10-05 | End: 2021-10-05

## 2021-10-05 RX ORDER — ACETAMINOPHEN 325 MG/1
650 TABLET ORAL EVERY 4 HOURS PRN
Status: DISCONTINUED | OUTPATIENT
Start: 2021-10-05 | End: 2021-10-06 | Stop reason: HOSPADM

## 2021-10-05 RX ORDER — CELECOXIB 100 MG/1
200 CAPSULE ORAL ONCE
Status: COMPLETED | OUTPATIENT
Start: 2021-10-05 | End: 2021-10-05

## 2021-10-05 RX ORDER — DIPHENHYDRAMINE HYDROCHLORIDE 50 MG/ML
25 INJECTION INTRAMUSCULAR; INTRAVENOUS EVERY 6 HOURS PRN
Status: DISCONTINUED | OUTPATIENT
Start: 2021-10-05 | End: 2021-10-05 | Stop reason: HOSPADM

## 2021-10-05 RX ORDER — ONDANSETRON 2 MG/ML
4 INJECTION INTRAMUSCULAR; INTRAVENOUS DAILY PRN
Status: DISCONTINUED | OUTPATIENT
Start: 2021-10-05 | End: 2021-10-05 | Stop reason: HOSPADM

## 2021-10-05 RX ORDER — GLYCOPYRROLATE 0.2 MG/ML
INJECTION INTRAMUSCULAR; INTRAVENOUS
Status: DISCONTINUED | OUTPATIENT
Start: 2021-10-05 | End: 2021-10-05

## 2021-10-05 RX ORDER — ONDANSETRON 2 MG/ML
4 INJECTION INTRAMUSCULAR; INTRAVENOUS EVERY 12 HOURS PRN
Status: DISCONTINUED | OUTPATIENT
Start: 2021-10-05 | End: 2021-10-06 | Stop reason: HOSPADM

## 2021-10-05 RX ORDER — PREGABALIN 75 MG/1
150 CAPSULE ORAL 2 TIMES DAILY
Status: DISCONTINUED | OUTPATIENT
Start: 2021-10-05 | End: 2021-10-06 | Stop reason: HOSPADM

## 2021-10-05 RX ORDER — CEFAZOLIN SODIUM 2 G/50ML
2 SOLUTION INTRAVENOUS
Status: DISCONTINUED | OUTPATIENT
Start: 2021-10-05 | End: 2021-10-05 | Stop reason: HOSPADM

## 2021-10-05 RX ORDER — LISINOPRIL 10 MG/1
10 TABLET ORAL DAILY
Status: DISCONTINUED | OUTPATIENT
Start: 2021-10-05 | End: 2021-10-06 | Stop reason: HOSPADM

## 2021-10-05 RX ORDER — MORPHINE SULFATE 2 MG/ML
2 INJECTION, SOLUTION INTRAMUSCULAR; INTRAVENOUS
Status: DISCONTINUED | OUTPATIENT
Start: 2021-10-05 | End: 2021-10-06 | Stop reason: HOSPADM

## 2021-10-05 RX ORDER — OXYCODONE HYDROCHLORIDE 5 MG/1
10 TABLET ORAL EVERY 4 HOURS PRN
Status: DISCONTINUED | OUTPATIENT
Start: 2021-10-05 | End: 2021-10-06 | Stop reason: HOSPADM

## 2021-10-05 RX ORDER — ONDANSETRON 2 MG/ML
INJECTION INTRAMUSCULAR; INTRAVENOUS
Status: DISCONTINUED | OUTPATIENT
Start: 2021-10-05 | End: 2021-10-05

## 2021-10-05 RX ORDER — ROCURONIUM BROMIDE 10 MG/ML
INJECTION, SOLUTION INTRAVENOUS
Status: DISCONTINUED | OUTPATIENT
Start: 2021-10-05 | End: 2021-10-05

## 2021-10-05 RX ORDER — FUROSEMIDE 20 MG/1
20 TABLET ORAL DAILY
Status: DISCONTINUED | OUTPATIENT
Start: 2021-10-05 | End: 2021-10-06 | Stop reason: HOSPADM

## 2021-10-05 RX ORDER — CEFAZOLIN SODIUM 2 G/50ML
2 SOLUTION INTRAVENOUS
Status: COMPLETED | OUTPATIENT
Start: 2021-10-05 | End: 2021-10-06

## 2021-10-05 RX ORDER — ROPIVACAINE HYDROCHLORIDE 7.5 MG/ML
INJECTION, SOLUTION EPIDURAL; PERINEURAL
Status: DISCONTINUED | OUTPATIENT
Start: 2021-10-05 | End: 2021-10-05

## 2021-10-05 RX ORDER — GABAPENTIN 300 MG/1
600 CAPSULE ORAL ONCE
Status: COMPLETED | OUTPATIENT
Start: 2021-10-05 | End: 2021-10-05

## 2021-10-05 RX ORDER — MIDAZOLAM HYDROCHLORIDE 1 MG/ML
INJECTION INTRAMUSCULAR; INTRAVENOUS
Status: DISCONTINUED | OUTPATIENT
Start: 2021-10-05 | End: 2021-10-05

## 2021-10-05 RX ORDER — MUPIROCIN 20 MG/G
OINTMENT TOPICAL 2 TIMES DAILY
Status: DISCONTINUED | OUTPATIENT
Start: 2021-10-05 | End: 2021-10-06 | Stop reason: HOSPADM

## 2021-10-05 RX ORDER — VECURONIUM BROMIDE FOR INJECTION 1 MG/ML
INJECTION, POWDER, LYOPHILIZED, FOR SOLUTION INTRAVENOUS
Status: DISCONTINUED | OUTPATIENT
Start: 2021-10-05 | End: 2021-10-05

## 2021-10-05 RX ORDER — SODIUM CHLORIDE, SODIUM LACTATE, POTASSIUM CHLORIDE, CALCIUM CHLORIDE 600; 310; 30; 20 MG/100ML; MG/100ML; MG/100ML; MG/100ML
INJECTION, SOLUTION INTRAVENOUS CONTINUOUS
Status: DISCONTINUED | OUTPATIENT
Start: 2021-10-05 | End: 2021-10-06 | Stop reason: HOSPADM

## 2021-10-05 RX ORDER — FAMOTIDINE 20 MG/1
20 TABLET, FILM COATED ORAL 2 TIMES DAILY
Status: DISCONTINUED | OUTPATIENT
Start: 2021-10-05 | End: 2021-10-06 | Stop reason: HOSPADM

## 2021-10-05 RX ORDER — OXYCODONE HYDROCHLORIDE 5 MG/1
5 TABLET ORAL
Status: DISCONTINUED | OUTPATIENT
Start: 2021-10-05 | End: 2021-10-05 | Stop reason: HOSPADM

## 2021-10-05 RX ADMIN — BACLOFEN 10 MG: 10 TABLET ORAL at 08:10

## 2021-10-05 RX ADMIN — CELECOXIB 200 MG: 100 CAPSULE ORAL at 07:10

## 2021-10-05 RX ADMIN — PROPOFOL 150 MG: 10 INJECTION, EMULSION INTRAVENOUS at 08:10

## 2021-10-05 RX ADMIN — MORPHINE SULFATE 2 MG: 2 INJECTION, SOLUTION INTRAMUSCULAR; INTRAVENOUS at 03:10

## 2021-10-05 RX ADMIN — FAMOTIDINE 20 MG: 20 TABLET ORAL at 08:10

## 2021-10-05 RX ADMIN — ROPIVACAINE HYDROCHLORIDE 30 ML: 7.5 INJECTION, SOLUTION EPIDURAL; PERINEURAL at 09:10

## 2021-10-05 RX ADMIN — GABAPENTIN 600 MG: 300 CAPSULE ORAL at 07:10

## 2021-10-05 RX ADMIN — PREGABALIN 150 MG: 75 CAPSULE ORAL at 08:10

## 2021-10-05 RX ADMIN — GLYCOPYRROLATE 0.4 MG: 0.2 INJECTION INTRAMUSCULAR; INTRAVENOUS at 09:10

## 2021-10-05 RX ADMIN — SODIUM CHLORIDE: 9 INJECTION, SOLUTION INTRAVENOUS at 07:10

## 2021-10-05 RX ADMIN — TRANEXAMIC ACID 1000 MG: 100 INJECTION, SOLUTION INTRAVENOUS at 08:10

## 2021-10-05 RX ADMIN — SODIUM CHLORIDE, POTASSIUM CHLORIDE, SODIUM LACTATE AND CALCIUM CHLORIDE: 600; 310; 30; 20 INJECTION, SOLUTION INTRAVENOUS at 12:10

## 2021-10-05 RX ADMIN — FENTANYL CITRATE 100 MCG: 50 INJECTION INTRAMUSCULAR; INTRAVENOUS at 08:10

## 2021-10-05 RX ADMIN — SODIUM CHLORIDE, POTASSIUM CHLORIDE, SODIUM LACTATE AND CALCIUM CHLORIDE: 600; 310; 30; 20 INJECTION, SOLUTION INTRAVENOUS at 09:10

## 2021-10-05 RX ADMIN — DOCUSATE SODIUM 100 MG: 100 CAPSULE, LIQUID FILLED ORAL at 08:10

## 2021-10-05 RX ADMIN — OXYCODONE HYDROCHLORIDE 10 MG: 5 TABLET ORAL at 10:10

## 2021-10-05 RX ADMIN — OXYCODONE HYDROCHLORIDE 10 MG: 10 TABLET, FILM COATED, EXTENDED RELEASE ORAL at 07:10

## 2021-10-05 RX ADMIN — CEFAZOLIN 2 G: 1 INJECTION, POWDER, FOR SOLUTION INTRAMUSCULAR; INTRAVENOUS; PARENTERAL at 08:10

## 2021-10-05 RX ADMIN — DEXTROSE MONOHYDRATE 2 G: 50 INJECTION, SOLUTION INTRAVENOUS at 05:10

## 2021-10-05 RX ADMIN — MORPHINE SULFATE 2 MG: 2 INJECTION, SOLUTION INTRAMUSCULAR; INTRAVENOUS at 12:10

## 2021-10-05 RX ADMIN — MUPIROCIN: 20 OINTMENT TOPICAL at 08:10

## 2021-10-05 RX ADMIN — SENNOSIDES, DOCUSATE SODIUM 1 TABLET: 8.6; 5 TABLET ORAL at 08:10

## 2021-10-05 RX ADMIN — ONDANSETRON 4 MG: 2 INJECTION INTRAMUSCULAR; INTRAVENOUS at 08:10

## 2021-10-05 RX ADMIN — DEXAMETHASONE SODIUM PHOSPHATE 10 MG: 4 INJECTION, SOLUTION INTRA-ARTICULAR; INTRALESIONAL; INTRAMUSCULAR; INTRAVENOUS; SOFT TISSUE at 08:10

## 2021-10-05 RX ADMIN — ROCURONIUM BROMIDE 50 MG: 10 INJECTION, SOLUTION INTRAVENOUS at 08:10

## 2021-10-05 RX ADMIN — LIDOCAINE HYDROCHLORIDE 100 MG: 20 INJECTION, SOLUTION INTRAVENOUS at 08:10

## 2021-10-05 RX ADMIN — ONDANSETRON 4 MG: 2 INJECTION INTRAMUSCULAR; INTRAVENOUS at 09:10

## 2021-10-05 RX ADMIN — OXYCODONE HYDROCHLORIDE 5 MG: 5 TABLET ORAL at 05:10

## 2021-10-05 RX ADMIN — MORPHINE SULFATE 2 MG: 2 INJECTION, SOLUTION INTRAMUSCULAR; INTRAVENOUS at 11:10

## 2021-10-05 RX ADMIN — METHYLNALTREXONE BROMIDE 12 MG: 12 INJECTION, SOLUTION SUBCUTANEOUS at 02:10

## 2021-10-05 RX ADMIN — VECURONIUM BROMIDE 2 MG: 1 INJECTION, POWDER, LYOPHILIZED, FOR SOLUTION INTRAVENOUS at 08:10

## 2021-10-05 RX ADMIN — MIDAZOLAM 2 MG: 1 INJECTION INTRAMUSCULAR; INTRAVENOUS at 08:10

## 2021-10-05 RX ADMIN — SUGAMMADEX 200 MG: 100 INJECTION, SOLUTION INTRAVENOUS at 09:10

## 2021-10-05 RX ADMIN — BACLOFEN 10 MG: 10 TABLET ORAL at 03:10

## 2021-10-06 VITALS
DIASTOLIC BLOOD PRESSURE: 72 MMHG | SYSTOLIC BLOOD PRESSURE: 132 MMHG | WEIGHT: 207.25 LBS | HEART RATE: 64 BPM | RESPIRATION RATE: 20 BRPM | HEIGHT: 69 IN | OXYGEN SATURATION: 97 % | TEMPERATURE: 98 F | BODY MASS INDEX: 30.7 KG/M2

## 2021-10-06 LAB
ANION GAP SERPL CALCULATED.3IONS-SCNC: 12 MMOL/L (ref 7–16)
BASOPHILS # BLD AUTO: 0.03 K/UL (ref 0–0.2)
BASOPHILS NFR BLD AUTO: 0.3 % (ref 0–1)
BUN SERPL-MCNC: 10 MG/DL (ref 7–18)
BUN/CREAT SERPL: 14 (ref 6–20)
CALCIUM SERPL-MCNC: 8.1 MG/DL (ref 8.5–10.1)
CHLORIDE SERPL-SCNC: 107 MMOL/L (ref 98–107)
CO2 SERPL-SCNC: 28 MMOL/L (ref 21–32)
CREAT SERPL-MCNC: 0.73 MG/DL (ref 0.7–1.3)
DIFFERENTIAL METHOD BLD: ABNORMAL
EOSINOPHIL # BLD AUTO: 0.02 K/UL (ref 0–0.5)
EOSINOPHIL NFR BLD AUTO: 0.2 % (ref 1–4)
ERYTHROCYTE [DISTWIDTH] IN BLOOD BY AUTOMATED COUNT: 12.5 % (ref 11.5–14.5)
GLUCOSE SERPL-MCNC: 109 MG/DL (ref 74–106)
HCT VFR BLD AUTO: 35.9 % (ref 40–54)
HGB BLD-MCNC: 12.6 G/DL (ref 13.5–18)
IMM GRANULOCYTES # BLD AUTO: 0.02 K/UL (ref 0–0.04)
IMM GRANULOCYTES NFR BLD: 0.2 % (ref 0–0.4)
LYMPHOCYTES # BLD AUTO: 2.36 K/UL (ref 1–4.8)
LYMPHOCYTES NFR BLD AUTO: 19.9 % (ref 27–41)
MCH RBC QN AUTO: 31.7 PG (ref 27–31)
MCHC RBC AUTO-ENTMCNC: 35.1 G/DL (ref 32–36)
MCV RBC AUTO: 90.2 FL (ref 80–96)
MONOCYTES # BLD AUTO: 1.28 K/UL (ref 0–0.8)
MONOCYTES NFR BLD AUTO: 10.8 % (ref 2–6)
MPC BLD CALC-MCNC: 9.7 FL (ref 9.4–12.4)
NEUTROPHILS # BLD AUTO: 8.16 K/UL (ref 1.8–7.7)
NEUTROPHILS NFR BLD AUTO: 68.6 % (ref 53–65)
NRBC # BLD AUTO: 0 X10E3/UL
NRBC, AUTO (.00): 0 %
PLATELET # BLD AUTO: 261 K/UL (ref 150–400)
POTASSIUM SERPL-SCNC: 4.1 MMOL/L (ref 3.5–5.1)
RBC # BLD AUTO: 3.98 M/UL (ref 4.6–6.2)
SODIUM SERPL-SCNC: 143 MMOL/L (ref 136–145)
WBC # BLD AUTO: 11.87 K/UL (ref 4.5–11)

## 2021-10-06 PROCEDURE — 85025 COMPLETE CBC W/AUTO DIFF WBC: CPT | Performed by: REGISTERED NURSE

## 2021-10-06 PROCEDURE — 36415 COLL VENOUS BLD VENIPUNCTURE: CPT | Performed by: REGISTERED NURSE

## 2021-10-06 PROCEDURE — 99900035 HC TECH TIME PER 15 MIN (STAT)

## 2021-10-06 PROCEDURE — 63600175 PHARM REV CODE 636 W HCPCS: Performed by: ORTHOPAEDIC SURGERY

## 2021-10-06 PROCEDURE — 94761 N-INVAS EAR/PLS OXIMETRY MLT: CPT

## 2021-10-06 PROCEDURE — 25000003 PHARM REV CODE 250: Performed by: ORTHOPAEDIC SURGERY

## 2021-10-06 PROCEDURE — 80048 BASIC METABOLIC PNL TOTAL CA: CPT | Performed by: REGISTERED NURSE

## 2021-10-06 PROCEDURE — 97165 OT EVAL LOW COMPLEX 30 MIN: CPT

## 2021-10-06 RX ADMIN — FAMOTIDINE 20 MG: 20 TABLET ORAL at 08:10

## 2021-10-06 RX ADMIN — PANTOPRAZOLE SODIUM 40 MG: 40 TABLET, DELAYED RELEASE ORAL at 08:10

## 2021-10-06 RX ADMIN — BACLOFEN 10 MG: 10 TABLET ORAL at 08:10

## 2021-10-06 RX ADMIN — DOCUSATE SODIUM 100 MG: 100 CAPSULE, LIQUID FILLED ORAL at 08:10

## 2021-10-06 RX ADMIN — METHYLNALTREXONE BROMIDE 12 MG: 12 INJECTION, SOLUTION SUBCUTANEOUS at 08:10

## 2021-10-06 RX ADMIN — MORPHINE SULFATE 2 MG: 2 INJECTION, SOLUTION INTRAMUSCULAR; INTRAVENOUS at 05:10

## 2021-10-06 RX ADMIN — PREGABALIN 150 MG: 75 CAPSULE ORAL at 08:10

## 2021-10-06 RX ADMIN — OXYCODONE HYDROCHLORIDE 10 MG: 5 TABLET ORAL at 03:10

## 2021-10-06 RX ADMIN — OXYCODONE HYDROCHLORIDE 10 MG: 5 TABLET ORAL at 08:10

## 2021-10-06 RX ADMIN — DEXTROSE MONOHYDRATE 2 G: 50 INJECTION, SOLUTION INTRAVENOUS at 12:10

## 2021-10-06 RX ADMIN — MUPIROCIN: 20 OINTMENT TOPICAL at 08:10

## 2021-10-06 RX ADMIN — SENNOSIDES, DOCUSATE SODIUM 1 TABLET: 8.6; 5 TABLET ORAL at 08:10

## 2021-10-06 RX ADMIN — LISINOPRIL 10 MG: 10 TABLET ORAL at 08:10

## 2021-10-06 RX ADMIN — FUROSEMIDE 20 MG: 20 TABLET ORAL at 08:10

## 2021-10-19 ENCOUNTER — OFFICE VISIT (OUTPATIENT)
Dept: SPINE | Facility: CLINIC | Age: 56
End: 2021-10-19
Payer: COMMERCIAL

## 2021-10-19 ENCOUNTER — HOSPITAL ENCOUNTER (OUTPATIENT)
Dept: RADIOLOGY | Facility: HOSPITAL | Age: 56
Discharge: HOME | End: 2021-10-19
Attending: NURSE PRACTITIONER
Payer: COMMERCIAL

## 2021-10-19 DIAGNOSIS — M43.27 FUSION OF SPINE, LUMBOSACRAL REGION: ICD-10-CM

## 2021-10-19 DIAGNOSIS — M47.16 SPONDYLOSIS WITH MYELOPATHY, LUMBAR REGION: Primary | ICD-10-CM

## 2021-10-19 PROCEDURE — 72100 X-RAY EXAM L-S SPINE 2/3 VWS: CPT | Mod: 26,,, | Performed by: STUDENT IN AN ORGANIZED HEALTH CARE EDUCATION/TRAINING PROGRAM

## 2021-10-19 PROCEDURE — 72100 XR LUMBAR SPINE AP AND LATERAL: ICD-10-PCS | Mod: 26,,, | Performed by: STUDENT IN AN ORGANIZED HEALTH CARE EDUCATION/TRAINING PROGRAM

## 2021-10-19 PROCEDURE — 99024 PR POST-OP FOLLOW-UP VISIT: ICD-10-PCS | Mod: ,,, | Performed by: NURSE PRACTITIONER

## 2021-10-19 PROCEDURE — 72100 X-RAY EXAM L-S SPINE 2/3 VWS: CPT | Mod: TC

## 2021-10-19 PROCEDURE — 99213 OFFICE O/P EST LOW 20 MIN: CPT | Mod: PBBFAC | Performed by: NURSE PRACTITIONER

## 2021-10-19 PROCEDURE — 99024 POSTOP FOLLOW-UP VISIT: CPT | Mod: ,,, | Performed by: NURSE PRACTITIONER

## 2021-10-19 RX ORDER — OXYCODONE AND ACETAMINOPHEN 10; 325 MG/1; MG/1
1 TABLET ORAL EVERY 4 HOURS PRN
Qty: 15 TABLET | Refills: 0 | Status: SHIPPED | OUTPATIENT
Start: 2021-10-19 | End: 2022-11-09

## 2021-11-04 ENCOUNTER — OFFICE VISIT (OUTPATIENT)
Dept: INTERNAL MEDICINE | Facility: CLINIC | Age: 56
End: 2021-11-04
Payer: COMMERCIAL

## 2021-11-04 VITALS
HEIGHT: 69 IN | SYSTOLIC BLOOD PRESSURE: 132 MMHG | RESPIRATION RATE: 18 BRPM | BODY MASS INDEX: 30.66 KG/M2 | DIASTOLIC BLOOD PRESSURE: 70 MMHG | HEART RATE: 68 BPM | OXYGEN SATURATION: 98 % | WEIGHT: 207 LBS

## 2021-11-04 DIAGNOSIS — Z76.89 ENCOUNTER TO ESTABLISH CARE WITH NEW DOCTOR: Primary | ICD-10-CM

## 2021-11-04 DIAGNOSIS — G89.4 CHRONIC PAIN SYNDROME: ICD-10-CM

## 2021-11-04 DIAGNOSIS — I10 ESSENTIAL HYPERTENSION: ICD-10-CM

## 2021-11-04 DIAGNOSIS — K21.9 GASTROESOPHAGEAL REFLUX DISEASE, UNSPECIFIED WHETHER ESOPHAGITIS PRESENT: ICD-10-CM

## 2021-11-04 PROCEDURE — 99214 OFFICE O/P EST MOD 30 MIN: CPT | Mod: PBBFAC | Performed by: INTERNAL MEDICINE

## 2021-11-04 PROCEDURE — 99214 OFFICE O/P EST MOD 30 MIN: CPT | Mod: S$PBB,,, | Performed by: INTERNAL MEDICINE

## 2021-11-04 PROCEDURE — 99214 PR OFFICE/OUTPT VISIT, EST, LEVL IV, 30-39 MIN: ICD-10-PCS | Mod: S$PBB,,, | Performed by: INTERNAL MEDICINE

## 2021-11-04 RX ORDER — TADALAFIL 20 MG/1
20 TABLET ORAL DAILY PRN
Qty: 8 TABLET | Refills: 5 | Status: SHIPPED | OUTPATIENT
Start: 2021-11-04 | End: 2023-09-14

## 2021-11-04 RX ORDER — CLINDAMYCIN PHOSPHATE 10 MG/G
GEL TOPICAL 2 TIMES DAILY
Qty: 60 G | Refills: 2 | Status: SHIPPED | OUTPATIENT
Start: 2021-11-04 | End: 2022-02-16

## 2021-11-19 DIAGNOSIS — Z47.89 ORTHOPEDIC AFTERCARE: Primary | ICD-10-CM

## 2021-11-22 ENCOUNTER — OFFICE VISIT (OUTPATIENT)
Dept: SPINE | Facility: CLINIC | Age: 56
End: 2021-11-22
Payer: COMMERCIAL

## 2021-11-22 ENCOUNTER — HOSPITAL ENCOUNTER (OUTPATIENT)
Dept: RADIOLOGY | Facility: HOSPITAL | Age: 56
Discharge: HOME | End: 2021-11-22
Attending: ORTHOPAEDIC SURGERY
Payer: COMMERCIAL

## 2021-11-22 DIAGNOSIS — M43.16 SPONDYLOLISTHESIS, LUMBAR REGION: Primary | ICD-10-CM

## 2021-11-22 DIAGNOSIS — M51.36 DDD (DEGENERATIVE DISC DISEASE), LUMBAR: ICD-10-CM

## 2021-11-22 DIAGNOSIS — Z47.89 ORTHOPEDIC AFTERCARE: ICD-10-CM

## 2021-11-22 DIAGNOSIS — M54.16 LUMBAR RADICULOPATHY: ICD-10-CM

## 2021-11-22 PROCEDURE — 72100 XR LUMBAR SPINE AP AND LATERAL: ICD-10-PCS | Mod: 26,,, | Performed by: ORTHOPAEDIC SURGERY

## 2021-11-22 PROCEDURE — 72100 X-RAY EXAM L-S SPINE 2/3 VWS: CPT | Mod: TC

## 2021-11-22 PROCEDURE — 99024 PR POST-OP FOLLOW-UP VISIT: ICD-10-PCS | Mod: ,,, | Performed by: ORTHOPAEDIC SURGERY

## 2021-11-22 PROCEDURE — 72100 X-RAY EXAM L-S SPINE 2/3 VWS: CPT | Mod: 26,,, | Performed by: ORTHOPAEDIC SURGERY

## 2021-11-22 PROCEDURE — 99024 POSTOP FOLLOW-UP VISIT: CPT | Mod: ,,, | Performed by: ORTHOPAEDIC SURGERY

## 2021-11-22 PROCEDURE — 99212 OFFICE O/P EST SF 10 MIN: CPT | Mod: PBBFAC | Performed by: ORTHOPAEDIC SURGERY

## 2021-11-22 RX ORDER — METHYLPREDNISOLONE 4 MG/1
TABLET ORAL
Qty: 21 EACH | Refills: 0 | Status: SHIPPED | OUTPATIENT
Start: 2021-11-22 | End: 2021-12-13

## 2022-01-24 ENCOUNTER — OFFICE VISIT (OUTPATIENT)
Dept: INTERNAL MEDICINE | Facility: CLINIC | Age: 57
End: 2022-01-24
Payer: COMMERCIAL

## 2022-01-24 VITALS
OXYGEN SATURATION: 99 % | WEIGHT: 210 LBS | HEIGHT: 69 IN | DIASTOLIC BLOOD PRESSURE: 78 MMHG | HEART RATE: 87 BPM | BODY MASS INDEX: 31.1 KG/M2 | SYSTOLIC BLOOD PRESSURE: 128 MMHG

## 2022-01-24 DIAGNOSIS — I87.2 VENOUS INSUFFICIENCY: ICD-10-CM

## 2022-01-24 DIAGNOSIS — K21.9 GASTROESOPHAGEAL REFLUX DISEASE, UNSPECIFIED WHETHER ESOPHAGITIS PRESENT: ICD-10-CM

## 2022-01-24 DIAGNOSIS — I10 ESSENTIAL HYPERTENSION: ICD-10-CM

## 2022-01-24 DIAGNOSIS — Z09 FOLLOW UP: Primary | ICD-10-CM

## 2022-01-24 DIAGNOSIS — L28.1 PRURIGO NODULARIS: ICD-10-CM

## 2022-01-24 DIAGNOSIS — L30.8 ASTEATOTIC ECZEMA: ICD-10-CM

## 2022-01-24 DIAGNOSIS — G89.4 CHRONIC PAIN SYNDROME: ICD-10-CM

## 2022-01-24 PROCEDURE — 99214 OFFICE O/P EST MOD 30 MIN: CPT | Mod: PBBFAC | Performed by: INTERNAL MEDICINE

## 2022-01-24 PROCEDURE — 3078F DIAST BP <80 MM HG: CPT | Mod: CPTII,,, | Performed by: INTERNAL MEDICINE

## 2022-01-24 PROCEDURE — 99214 OFFICE O/P EST MOD 30 MIN: CPT | Mod: S$PBB,,, | Performed by: INTERNAL MEDICINE

## 2022-01-24 PROCEDURE — 3008F PR BODY MASS INDEX (BMI) DOCUMENTED: ICD-10-PCS | Mod: CPTII,,, | Performed by: INTERNAL MEDICINE

## 2022-01-24 PROCEDURE — 1159F PR MEDICATION LIST DOCUMENTED IN MEDICAL RECORD: ICD-10-PCS | Mod: CPTII,,, | Performed by: INTERNAL MEDICINE

## 2022-01-24 PROCEDURE — 3074F PR MOST RECENT SYSTOLIC BLOOD PRESSURE < 130 MM HG: ICD-10-PCS | Mod: CPTII,,, | Performed by: INTERNAL MEDICINE

## 2022-01-24 PROCEDURE — 1159F MED LIST DOCD IN RCRD: CPT | Mod: CPTII,,, | Performed by: INTERNAL MEDICINE

## 2022-01-24 PROCEDURE — 3078F PR MOST RECENT DIASTOLIC BLOOD PRESSURE < 80 MM HG: ICD-10-PCS | Mod: CPTII,,, | Performed by: INTERNAL MEDICINE

## 2022-01-24 PROCEDURE — 3074F SYST BP LT 130 MM HG: CPT | Mod: CPTII,,, | Performed by: INTERNAL MEDICINE

## 2022-01-24 PROCEDURE — 3008F BODY MASS INDEX DOCD: CPT | Mod: CPTII,,, | Performed by: INTERNAL MEDICINE

## 2022-01-24 PROCEDURE — 99214 PR OFFICE/OUTPT VISIT, EST, LEVL IV, 30-39 MIN: ICD-10-PCS | Mod: S$PBB,,, | Performed by: INTERNAL MEDICINE

## 2022-01-24 RX ORDER — FUROSEMIDE 40 MG/1
40 TABLET ORAL DAILY
Qty: 90 TABLET | Refills: 3 | Status: SHIPPED | OUTPATIENT
Start: 2022-01-24 | End: 2022-02-02 | Stop reason: SDUPTHER

## 2022-01-24 RX ORDER — TRIAMCINOLONE ACETONIDE 1 MG/G
OINTMENT TOPICAL 2 TIMES DAILY
Qty: 80 G | Refills: 5 | Status: SHIPPED | OUTPATIENT
Start: 2022-01-24 | End: 2022-07-13 | Stop reason: SDUPTHER

## 2022-01-24 NOTE — PROGRESS NOTES
Subjective:       Patient ID: Evin Mtz is a 56 y.o. male.    Chief Complaint: Follow-up (Left leg swelling with rash )    The patient is a 56-year-old white male the presents today to establish.  We saw him about 6 weeks ago for surgical pre-op risk stratification. He underwent L4-L5 fusion on October 5th. He is recovering well but still has some pain. He has a history of hypertension, GERD, venous insufficiency.  No known history of coronary artery disease, cerebrovascular disease, or CHF, he denies any chest pain at rest or with moderate exertion.  He had a left heart catheterization around 2018 that showed normal coronaries.  He underwent right total knee replacement in 2020 without any complications.  No significant changes in his health since then.  He has chronic bilateral lower extremity edema.  He had a stent to the iliac vein on the right.  This did not improve his symptoms.  He has been seen by vascular surgery and also by vein specialist without much improvement in his symptoms.  Today he is resting comfortably in no distress.  He is afebrile and vital signs are stable.    1/24-the patient presents today for follow-up.  His biggest issue right now is swelling in his left lower extremity.  He has been seen by the vein clinic and they recommended lymphatic pumps but the patient states that he does not have time to do this.  He wants to know if there is anything that can be done to fix this.  He would like to be referred back to Dr. Stern in [facility].  He states the Lasix does help some.  He does need a refill on this.  Outside of his swelling is doing okay.  Blood pressure looks good.  He is resting comfortably in no distress.  He is afebrile vital signs are stable.    Follow-up  Pertinent negatives include no abdominal pain, arthralgias, chest pain, chills, coughing, fever, headaches, joint swelling, myalgias, nausea, neck pain, rash, sore throat or weakness.     Review of Systems    Constitutional: Negative for appetite change, chills and fever.   HENT: Negative for ear pain, hearing loss, sinus pressure/congestion and sore throat.    Eyes: Negative for pain, redness and visual disturbance.   Respiratory: Negative for apnea, cough, shortness of breath and wheezing.    Cardiovascular: Positive for leg swelling. Negative for chest pain and palpitations.   Gastrointestinal: Negative for abdominal pain, blood in stool, constipation, diarrhea and nausea.   Endocrine: Negative for cold intolerance, heat intolerance and polyuria.   Genitourinary: Negative for dysuria and hematuria.   Musculoskeletal: Positive for back pain. Negative for arthralgias, joint swelling, myalgias and neck pain.   Integumentary:  Negative for pallor and rash.   Allergic/Immunologic: Negative for frequent infections.   Neurological: Negative for tremors, seizures, weakness and headaches.   Hematological: Negative for adenopathy.   Psychiatric/Behavioral: Negative for confusion, dysphoric mood and sleep disturbance. The patient is not nervous/anxious.          Objective:      Physical Exam  Vitals and nursing note reviewed.   Constitutional:       General: He is not in acute distress.     Appearance: Normal appearance. He is not ill-appearing.   HENT:      Head: Normocephalic and atraumatic.      Right Ear: External ear normal.      Left Ear: External ear normal.      Nose: Nose normal.      Mouth/Throat:      Pharynx: Oropharynx is clear.   Eyes:      Extraocular Movements: Extraocular movements intact.      Conjunctiva/sclera: Conjunctivae normal.      Pupils: Pupils are equal, round, and reactive to light.   Neck:      Vascular: No carotid bruit.   Cardiovascular:      Rate and Rhythm: Normal rate and regular rhythm.      Pulses: Normal pulses.      Heart sounds: Normal heart sounds. No murmur heard.      Pulmonary:      Effort: No respiratory distress.      Breath sounds: Normal breath sounds. No wheezing or rales.    Abdominal:      General: Bowel sounds are normal. There is no distension.      Palpations: Abdomen is soft.   Musculoskeletal:         General: Normal range of motion.      Cervical back: Normal range of motion and neck supple.      Right lower leg: Edema present.      Left lower leg: Edema present.      Comments: Left > right   Skin:     General: Skin is warm and dry.      Capillary Refill: Capillary refill takes less than 2 seconds.      Coloration: Skin is not pale.      Findings: Lesion present.   Neurological:      General: No focal deficit present.      Mental Status: He is alert and oriented to person, place, and time.      Cranial Nerves: No cranial nerve deficit.   Psychiatric:         Mood and Affect: Mood normal.         Judgment: Judgment normal.         Assessment:       Problem List Items Addressed This Visit        Neuro    Chronic pain syndrome       Cardiac/Vascular    Essential hypertension    Venous insufficiency       GI    Gastroesophageal reflux disease      Other Visit Diagnoses     Follow up    -  Primary    Asteatotic eczema        Relevant Medications    triamcinolone acetonide 0.1% (KENALOG) 0.1 % ointment    Prurigo nodularis        Relevant Medications    triamcinolone acetonide 0.1% (KENALOG) 0.1 % ointment          Plan:       1. The patient presents today for follow up.    2. Essential hypertension-blood pressure is at goal.  Currently 128/78.  He is on lisinopril 10 mg daily although he is not sure if he is currently taking.  He also takes Lasix p.r.n.    3. GERD-continue with PPI daily    4. Erectile dysfunction-he uses Cialis p.r.n.    5. Chronic venous disease-he can not tolerate compression hose.  He uses Lasix p.r.n.  This is his biggest issue currently. He has seen Dr. Reed and Dr. Stern in the past. We are going to refer back to Dr. Stern. He states that he cannot wear lymphatic pumps because of time. He wants to know if there is something else that can be done. For now we  will continue with lasix 40 mg and he will take twice a day for the next couple of days. Elevate leg when seated. He has a history of venous stent and was on antiplatelet therapy for about 1 year. This is not needed any longer.    6. Eczema/ prurigo nodularis- He has kenalog ointment. He has seen dermatology    He will follow-up in 6 months or sooner if needed.

## 2022-02-02 RX ORDER — FUROSEMIDE 40 MG/1
40 TABLET ORAL DAILY
Qty: 90 TABLET | Refills: 3 | Status: SHIPPED | OUTPATIENT
Start: 2022-02-02

## 2022-02-07 ENCOUNTER — HOSPITAL ENCOUNTER (OUTPATIENT)
Dept: RADIOLOGY | Facility: HOSPITAL | Age: 57
Discharge: HOME | End: 2022-02-07
Attending: ORTHOPAEDIC SURGERY
Payer: COMMERCIAL

## 2022-02-07 DIAGNOSIS — M25.511 RIGHT SHOULDER PAIN, UNSPECIFIED CHRONICITY: ICD-10-CM

## 2022-02-07 PROBLEM — M75.41 IMPINGEMENT SYNDROME OF RIGHT SHOULDER: Status: ACTIVE | Noted: 2022-02-07

## 2022-02-07 PROCEDURE — 73030 X-RAY EXAM OF SHOULDER: CPT | Mod: TC,RT

## 2022-02-25 DIAGNOSIS — Z09 SURGERY FOLLOW-UP EXAMINATION: Primary | ICD-10-CM

## 2022-03-07 ENCOUNTER — OFFICE VISIT (OUTPATIENT)
Dept: SPINE | Facility: CLINIC | Age: 57
End: 2022-03-07
Payer: COMMERCIAL

## 2022-03-07 ENCOUNTER — HOSPITAL ENCOUNTER (OUTPATIENT)
Dept: RADIOLOGY | Facility: HOSPITAL | Age: 57
Discharge: HOME | End: 2022-03-07
Attending: ORTHOPAEDIC SURGERY
Payer: COMMERCIAL

## 2022-03-07 DIAGNOSIS — Z47.89 ORTHOPEDIC AFTERCARE: ICD-10-CM

## 2022-03-07 DIAGNOSIS — M54.16 LUMBAR RADICULOPATHY: ICD-10-CM

## 2022-03-07 DIAGNOSIS — M51.36 DDD (DEGENERATIVE DISC DISEASE), LUMBAR: ICD-10-CM

## 2022-03-07 DIAGNOSIS — M43.16 SPONDYLOLISTHESIS, LUMBAR REGION: Primary | ICD-10-CM

## 2022-03-07 DIAGNOSIS — Z09 SURGERY FOLLOW-UP EXAMINATION: ICD-10-CM

## 2022-03-07 PROCEDURE — 72100 X-RAY EXAM L-S SPINE 2/3 VWS: CPT | Mod: 26,,, | Performed by: ORTHOPAEDIC SURGERY

## 2022-03-07 PROCEDURE — 1159F PR MEDICATION LIST DOCUMENTED IN MEDICAL RECORD: ICD-10-PCS | Mod: CPTII,,, | Performed by: ORTHOPAEDIC SURGERY

## 2022-03-07 PROCEDURE — 99214 PR OFFICE/OUTPT VISIT, EST, LEVL IV, 30-39 MIN: ICD-10-PCS | Mod: S$PBB,,, | Performed by: ORTHOPAEDIC SURGERY

## 2022-03-07 PROCEDURE — 99214 OFFICE O/P EST MOD 30 MIN: CPT | Mod: S$PBB,,, | Performed by: ORTHOPAEDIC SURGERY

## 2022-03-07 PROCEDURE — 72100 X-RAY EXAM L-S SPINE 2/3 VWS: CPT | Mod: TC

## 2022-03-07 PROCEDURE — 1159F MED LIST DOCD IN RCRD: CPT | Mod: CPTII,,, | Performed by: ORTHOPAEDIC SURGERY

## 2022-03-07 PROCEDURE — 72100 XR LUMBAR SPINE AP AND LATERAL: ICD-10-PCS | Mod: 26,,, | Performed by: ORTHOPAEDIC SURGERY

## 2022-03-07 PROCEDURE — 99213 OFFICE O/P EST LOW 20 MIN: CPT | Mod: PBBFAC | Performed by: ORTHOPAEDIC SURGERY

## 2022-03-07 NOTE — PROGRESS NOTES
AP, lateral views of the lumbar spine reviewed    On the AP there is normal coronal alignment.  There are 5 non-rib-bearing lumbar vertebrae.  On the lateral there is maintained lumbar lordosis.  There has been prior L5-S1 laminectomy and fusion.  He is status post L4-5 anterior lumbar interbody fusion.  No signs of hardware loosening or failure.    Impression:  Spondylotic changes of the lumbar spine as noted above

## 2022-03-08 NOTE — PROGRESS NOTES
MDM/time:  Greater than 30 minutes spent on this encounter including 10 minutes reviewing imaging and notes, 15 minutes with the patient, 5 minutes documentation    ASSESSMENT:  56 y.o. male with lumbar spondylolisthesis and radiculopathy now status post L4-5 anterior lumbar interbody fusion above his prior L5-S1 posterior laminectomy fusion 10/05/2021    PLAN:  I have ordered physical therapy.  Follow-up in 3 months    HPI:  56 y.o. male here for repeat evaluation of lumbar spondylolisthesis and radiculopathy now status post L4-5 anterior lumbar interbody fusion above his prior L5-S1 posterior laminectomy fusion 10/05/2021 reports that his preoperative radicular pain has improved.  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    IMAGING:  X-rays lumbar spine reviewed show:  On the AP there is normal coronal alignment.  There are 5 non-rib-bearing lumbar vertebrae.  On the lateral there is maintained lumbar lordosis.  There has been prior L5-S1 laminectomy and fusion.  He is status post L4-5 anterior lumbar interbody fusion.  No signs of hardware loosening or failure.    Past Medical History:   Diagnosis Date    GERD (gastroesophageal reflux disease)     Hypertension      Past Surgical History:   Procedure Laterality Date    ANTERIOR LUMBAR INTERBODY FUSION (ALIF) N/A 10/5/2021    Procedure: L4-L5 anterior lumbar interbody fusion;  Surgeon: Jayden Mtz MD;  Location: [facility];  Service: Neurosurgery;  Laterality: N/A;    ILIAC ARTERY STENT      JOINT REPLACEMENT Right     tka    LAPAROSCOPIC REPAIR OF HIATAL HERNIA      SINUS SURGERY       Social History     Tobacco Use    Smoking status: Never Smoker    Smokeless tobacco: Never Used   Substance Use Topics    Alcohol use: Never    Drug use: Never      Current Outpatient Medications   Medication Instructions    baclofen (LIORESAL) 10 MG tablet No dose, route, or frequency recorded.    clindamycin  phosphate 1% (CLINDAGEL) 1 % gel APPLY TO AFFECTED AREA TWICE DAILY    dextroamphetamine-amphetamine (ADDERALL) 20 mg tablet 1 tablet, Oral, 2 times daily    esomeprazole (NEXIUM) 40 MG capsule TAKE ONE (1) CAPSULE (40 MG TOTAL) BY MOUTH BEFORE BREAKFAST.    furosemide (LASIX) 40 mg, Oral, Daily    ketoconazole (NIZORAL) 2 % cream Topical (Top), Daily    lisinopriL 10 mg, Oral, Daily    oxyCODONE-acetaminophen (PERCOCET)  mg per tablet 1 tablet, Oral, Every 4 hours PRN    pregabalin (LYRICA) 150 MG capsule No dose, route, or frequency recorded.    promethazine (PHENERGAN) 25 mg, Oral, Every 4 hours    rifAMpin (RIFADIN) 300 mg, Oral, Every 12 hours    tadalafiL (CIALIS) 20 mg, Oral, Daily PRN    tiZANidine (ZANAFLEX) 4 MG tablet No dose, route, or frequency recorded.    triamcinolone acetonide 0.1% (KENALOG) 0.1 % ointment Topical (Top), 2 times daily        EXAM:  Constitutional  General Appearance:  There is no height or weight on file to calculate BMI., NAD  Psychiatric   Orientation: Oriented to time, oriented to place, oriented to person  Mood and Affect: Active and alert, normal mood, normal affect  Gait and Station   Appearance:  Normal gait, normal tandem gait, able to walk on toes, able to walk on heels  Healed incision  5/5 strength  Sensation intact  2+ pulses

## 2022-03-15 ENCOUNTER — CLINICAL SUPPORT (OUTPATIENT)
Dept: REHABILITATION | Facility: HOSPITAL | Age: 57
End: 2022-03-15
Attending: ORTHOPAEDIC SURGERY
Payer: COMMERCIAL

## 2022-03-15 DIAGNOSIS — Z47.89 ORTHOPEDIC AFTERCARE: ICD-10-CM

## 2022-03-15 DIAGNOSIS — Z98.1 S/P LUMBAR FUSION: Primary | ICD-10-CM

## 2022-03-15 PROCEDURE — 97110 THERAPEUTIC EXERCISES: CPT

## 2022-03-15 PROCEDURE — 97161 PT EVAL LOW COMPLEX 20 MIN: CPT

## 2022-03-15 NOTE — PLAN OF CARE
"[facility]   Physical Therapy Initial Evaluation    Name: Evin Mtz  Clinic Number: [id number]    Therapy Diagnosis:   Encounter Diagnoses   Name Primary?    Orthopedic aftercare     S/P lumbar fusion Yes     Physician: Jayden Mtz MD    Physician Orders: PT Eval and Treat   Medical Diagnosis from Referral: s/p lumbar fusion  Evaluation Date: 3/15/2022  Authorization Period Expiration: 12/31/2022  Plan of Care Expiration: 4/29/2022  Progress Note Due: 4/15/2022  Visit # / Visits authorized: 1/ 50    Time In: 138 pm  Time Out: 230 pm  Total Appointment Time (timed & untimed codes): 52 minutes    Precautions: Standard    Subjective   Date of onset: 10/5/2021  History of current condition - Evin reports: patient underwent lumbar fusion L4-L5 - nerve pain is better - says his back and both legs still hurts but says the pain is different, not the nerve pain as before surgery - says when he moves around it eases the pain - right leg began swelling bad after his knee surgery - now since the back surgery the left leg is swelling bad - he says one of his doctor's told him he thought his lymphatic system got "shocked" with those two surgeries close together - may be having some issues with lymphedema - also has h/o of fusion at L5-S1 in 2003 or 2004     Medical History:   Past Medical History:   Diagnosis Date    GERD (gastroesophageal reflux disease)     Hypertension        Surgical History:   Evin Mtz  has a past surgical history that includes Sinus surgery; Joint replacement (Right); Laparoscopic repair of hiatal hernia; Iliac artery stent; and Anterior lumbar interbody fusion (ALIF) (N/A, 10/5/2021).    Medications:   Evin has a current medication list which includes the following prescription(s): baclofen, clindamycin phosphate 1%, dextroamphetamine-amphetamine, esomeprazole, furosemide, ketoconazole, lisinopril, oxycodone-acetaminophen, pregabalin, promethazine, rifampin, " tadalafil, tizanidine, and triamcinolone acetonide 0.1%.    Allergies:   Review of patient's allergies indicates:   Allergen Reactions    Aspirin Other (See Comments)     Gi symptoms-burning        Imaging, MRI studies:     Prior Therapy: for back and knee  Social History:  lives with their spouse  Occupation:  at [employer]  Prior Level of Function: independent   Current Level of Function: independent     Pain:  Current 4/10, worst 8/10, best 3/10   Location: central and slighlty left back and back of both legs and shin  Description: Aching, Dull in legs and Sharp in back  Aggravating Factors: Sitting and Morning worse on back, walking too much causes cramps in legs  Easing Factors: moving around    Pts goals: have less pain in back and legs    Objective     Posture:  1. Standing lordosis: WNL  2. Sitting lordosis: WNL  3. Scoliosis: no  4. Lateral shift: none  5. Comments:    Forward bend: WFL  Back bend:  WFL  Lateral trunk lean: right NT left NT  Trunk rotation: right WFL left WFL    MANUAL MUSCLE TEST  Right left   Hip flexion MMT number: 5/5 MMT strength: 4-/5   Hip abduction MMT strength: 5/5 MMT strength: 4/5   Knee extension MMT strength: 5/5 MMT strength: 4/5   Knee flexion  MMT strength: 5/5 MMT strength: 4/5   Ankle dorsiflexion MMT strength: 5/5 MMT strength: 4/5   Ankle plantar flexion  MMT strength: 5/5 MMT strength: 4/5     ROM/flexibility right left   Hip flexion (120) WFL 90   Internal rotation (45) 30 20   External rotation (45) 45 35   Hamstring 90/90 (-10) -10 WFL   Rectus femoris (120) NT NT     Special Tests  -not performed secondary to patient is postop lumbar fusion    Gait assessment:   Antalgic gait: no  Assistive device: none      Limitation/Restriction for FOTO Lumbar Survey    Therapist reviewed FOTO scores for Evin Mtz on 3/15/2022.   FOTO documents entered into [employer] - see Media section.    Intake Score: 45         TREATMENT     Total Treatment time  (time-based codes) separate from Evaluation: 8 minutes    Evin received the treatments listed below:  THERAPEUTIC EXERCISES to develop strength, endurance and core stabilization for 8 minutes including bridging, hooklying hip abduction w/ theraband, hooklying hip adduction squeezes, and LTR    Home Exercises and Patient Education Provided    Education provided:   - evaluation results, plan of care and home exercise program     Written Home Exercises Provided: yes.  Exercises were reviewed and Evin was able to demonstrate them prior to the end of the session.  Evin demonstrated good  understanding of the education provided.     See EMR under Patient Instructions for exercises provided 3/15/2022.    Assessment   Evin is a 56 y.o. male referred to outpatient Physical Therapy with a medical diagnosis of s/p lumbar fusion. Pt presents with back and leg pain following most recent lumbar fusion. He has some left lower extremity weakness as well. He reports he is still having problems with lower extremity swelling. He is seeing a doctor about this. He is working but has pain everyday.     Pt prognosis is Good.   Pt will benefit from skilled outpatient Physical Therapy to address the deficits stated above and in the chart below, provide pt/family education, and to maximize pt's level of independence.     Plan of care discussed with patient: Yes  Pt's spiritual, cultural and educational needs considered and patient is agreeable to the plan of care and goals as stated below:     Anticipated Barriers for therapy: none      1.   Patient will be independent with home exercise program to facilitate carryover between visits.  2.   Patient will report decrease in pain to 2/10 to improve quality of life  3.   Patient will report decrease in radiating occurrences from daily to </= 3 times a week to allow patient the       ability to perform activities of daily living   4.   Patient will increase left lower extremity strength to  5/5 to improve ambulation ability  5.   Patient will increase left hip external rotation range of motion by 10 degrees for improved mobility        Plan   Plan of care Certification: 3/15/2022 to 4/29/2022.    Outpatient Physical Therapy 2 times weekly for 6 weeks to include the following interventions: Electrical Stimulation IFC/premod as needed, Manual Therapy, Moist Heat/ Ice, Neuromuscular Re-ed, Patient Education, Therapeutic Activities and Therapeutic Exercise.     KEHINDE DING, PT

## 2022-03-23 ENCOUNTER — CLINICAL SUPPORT (OUTPATIENT)
Dept: REHABILITATION | Facility: HOSPITAL | Age: 57
End: 2022-03-23
Attending: ORTHOPAEDIC SURGERY
Payer: COMMERCIAL

## 2022-03-23 DIAGNOSIS — Z98.1 S/P LUMBAR FUSION: Primary | ICD-10-CM

## 2022-03-23 DIAGNOSIS — G89.4 CHRONIC PAIN SYNDROME: ICD-10-CM

## 2022-03-23 PROCEDURE — 97014 ELECTRIC STIMULATION THERAPY: CPT | Mod: CQ

## 2022-03-23 PROCEDURE — 97110 THERAPEUTIC EXERCISES: CPT | Mod: CQ

## 2022-03-23 NOTE — PROGRESS NOTES
[facility]   Physical Therapy Treatment Note     Name: Evin Mtz  Clinic Number: [id number]      Therapy Diagnosis:        Encounter Diagnoses   Name Primary?    Orthopedic aftercare      S/P lumbar fusion Yes      Physician: Jayden Mtz MD     Physician Orders: PT Eval and Treat   Medical Diagnosis from Referral: s/p lumbar fusion  Evaluation Date: 3/15/2022  Authorization Period Expiration: 12/31/2022  Plan of Care Expiration: 4/29/2022  Progress Note Due: 4/15/2022  Visit # / Visits authorized: 2/ 50    Visit Date: 3/23/2022    PTA Visit #: 1/5     Time In: 1:00 pm  Time Out: 1:48 pm  Total Billable Time: 48 minutes    SUBJECTIVE     Pt reports: he has pain all the way down both legs at the back of his ankles. Pt reports he has had therapy several times before for different things.  He was compliant with home exercise program.  Response to previous treatment: first visit since evaluation   Functional change: n/a    Pain: 5/10  Location: bilateral back and lower extremity down to ankles    OBJECTIVE     Objective Measures updated at progress report unless specified.     Treatment     Evin received the treatments listed below:      therapeutic exercises to develop strength, ROM, flexibility, posture and core stabilization for 30 minutes including:      Back Exercises    Bike/NuStep          NuStep 6 minutes       Calf Stretch 4 x 15 second hold    Hamstring Stretch Bilateral at stairs 4 x 20 second hold ea    Pelvic Tilts During hooklying exercises   Bridging 20x over peanut 2 second hold    Hip adduction squeezes in hooklying 20x 2 second hold       Sciatic nerve flossing over peanut 10x ea lower extremity    Bridging/Hooklying with Marching --   Bridging/Hooklying with Knee Ext ----   Trunk Rotation Exercise ---   Trunk Rotation Stretch ---   Ball - Trunk Rotation --   Ball- Knee Extension 10x ea lower extremity        Seated piriformis stretch Bilateral 2 x 30sh    DKTC  stretch with towel in supine  20x 2 second hold            supervised modalities after being cleared for contradictions: IFC Electrical Stimulation:  Evin received IFC Electrical Stimulation for pain control applied to the lumbar spine. Pt received for 10 minutes with hot pack. Evin tolderated treatment well without any adverse effects.      hot pack for 10 minutes to low back.        Patient Education and Home Exercises     Home Exercises Provided and Patient Education Provided     Education provided:   - postural awareness with neutral spine alignment during exercises and with activities throughout the day    Written Home Exercises Provided: Patient instructed to cont prior HEP. Exercises were reviewed and Evin was able to demonstrate them prior to the end of the session.  Evin demonstrated good  understanding of the education provided. See EMR under Patient Instructions for exercises provided during therapy sessions    ASSESSMENT     Case conference with Ada Tsai PT prior to initial PTA visit.  Pt tolerated initial tx well since evaluation. Pt struggles to perform exercises due to severity of p! Down bilateral lower extremities to his ankles. He has kyphotic posture which is habitual even when given cues, he does not maintain neutral spine for sustained period. Pt expressed relief after basic pelvic and lumbar mobility stretches and exercises were performed today. Ended tx with IFC to lower lumbar spine with hot pacl for 10'. Pt expressed relief with this and had decreased pain to 3-4/10 post tx! With radicular symptoms minimized.    Evin is a 56 y.o. male referred to outpatient Physical Therapy with a medical diagnosis of s/p lumbar fusion. Pt presents with back and leg pain following most recent lumbar fusion. He has some left lower extremity weakness as well. He reports he is still having problems with lower extremity swelling. He is seeing a doctor about this. He is working but has pain everyday.         Evin Is progressing towards his goals.   Pt prognosis is Good.     Pt will continue to benefit from skilled outpatient physical therapy to address the deficits listed in the problem list box on initial evaluation, provide pt/family education and to maximize pt's level of independence in the home and community environment.     Pt's spiritual, cultural and educational needs considered and pt agreeable to plan of care and goals.     Anticipated barriers to physical therapy: compliance with HEP    Goals:  1.   Patient will be independent with home exercise program to facilitate carryover between visits.  2.   Patient will report decrease in pain to 2/10 to improve quality of life  3.   Patient will report decrease in radiating occurrences from daily to </= 3 times a week to allow patient the       ability to perform activities of daily living   4.   Patient will increase left lower extremity strength to 5/5 to improve ambulation ability  5.   Patient will increase left hip external rotation range of motion by 10 degrees for improved mobility         PLAN     Plan of care Certification: 3/15/2022 to 4/29/2022.     Outpatient Physical Therapy 2 times weekly for 6 weeks to include the following interventions: Electrical Stimulation IFC/premod as needed, Manual Therapy, Moist Heat/ Ice, Neuromuscular Re-ed, Patient Education, Therapeutic Activities and Therapeutic Exercise.       Art Doss, PTA

## 2022-03-28 ENCOUNTER — CLINICAL SUPPORT (OUTPATIENT)
Dept: REHABILITATION | Facility: HOSPITAL | Age: 57
End: 2022-03-28
Attending: ORTHOPAEDIC SURGERY
Payer: COMMERCIAL

## 2022-03-28 DIAGNOSIS — Z98.1 S/P LUMBAR FUSION: Primary | ICD-10-CM

## 2022-03-28 PROCEDURE — 97112 NEUROMUSCULAR REEDUCATION: CPT | Mod: CQ

## 2022-03-28 PROCEDURE — 97110 THERAPEUTIC EXERCISES: CPT | Mod: CQ

## 2022-03-28 NOTE — PROGRESS NOTES
"[facility]   Physical Therapy Treatment Note     Name: Evin Mtz  Clinic Number: [id number]      Therapy Diagnosis:        Encounter Diagnoses   Name Primary?    Orthopedic aftercare      S/P lumbar fusion Yes      Physician: Jayden Mtz MD     Physician Orders: PT Eval and Treat   Medical Diagnosis from Referral: s/p lumbar fusion  Evaluation Date: 3/15/2022  Authorization Period Expiration: 12/31/2022  Plan of Care Expiration: 4/29/2022  Progress Note Due: 4/15/2022  Visit # / Visits authorized: 3/ 50    Visit Date: 3/28/2022    PTA Visit #: 2/5     Time In: 7:05 am  Time Out: 8:03 am  Total Billable Time: 58 minutes    SUBJECTIVE     Pt reports: He actually had the stomach virus last week and didn't realize that was why he was feeling so bad. He was running a low grade fever.   He was compliant with home exercise program.  Response to previous treatment: the therapy helped his back pain  Functional change: n/a    Pain: 3/10 "just enough to aggravate me"  Location: bilateral back and lower extremity down to ankles    OBJECTIVE     Objective Measures updated at progress report unless specified.     Treatment     Case conference with Ada Tsai, PT, for initial PTA visit.     Evin received the treatments listed below:      Therapeutic exercises to develop strength, ROM, flexibility, posture and core stabilization for 28 minutes including:      Back Exercises     3/23/2022 3/28/2022    Bike/NuStep          NuStep 6 minutes     Nustep 6 minutes     Calf Stretch 4 x 15 second hold  4 x 15 second hold     Hamstring Stretch bilateral  Bilateral at stairs 4 x 20 second hold ea  4 x 20 second hold     Pelvic Tilts During hooklying exercises --    Bridging 20x over peanut 2 second hold  --    Hip adduction squeezes in hooklying 20x 2 second hold --    Hooklying hip abduction   --    Sciatic nerve flossing over peanut 10x ea lower extremity  X 10 each leg     Hooklying with Marching " -- --     ---- --    Trunk flexion stretch with ball --- 10 x 10 second hold     Trunk Rotation Stretch --- 10 x 5 second hold bilateral     Ball - Trunk Rotation --     Ball- Knee Extension 10x ea lower extremity            Seated piriformis stretch Bilateral 2 x 30sh  2 x 30 second hold each leg    DKTC stretch with towel in supine  20x 2 second hold  10 x 10 second hold       Neuromuscular reeducation to improve posture and kinesthetic sense activities x 30 minutes that included:   3/28/2022     Pelvic tilt 10 x 10 second hold      Bridging  2 x 10 with 3 second hold      hooklying with march Blue x 20      Hooklying with hip abduction  Blue x 20     Hooklying with hip adduction  10 x 10 second hold                        supervised modalities after being cleared for contradictions: IFC Electrical Stimulation:  Evin received IFC Electrical Stimulation for pain control applied to the lumbar spine. Pt received for 0 minutes with hot pack. Evin tolderated treatment well without any adverse effects.      hot pack for 0 minutes to low back.        Patient Education and Home Exercises     Home Exercises Provided and Patient Education Provided     Education provided:   - postural awareness with neutral spine alignment during exercises and with activities throughout the day    Written Home Exercises Provided: Patient instructed to cont prior HEP. Exercises were reviewed and Evin was able to demonstrate them prior to the end of the session.  Evin demonstrated good  understanding of the education provided. See EMR under Patient Instructions for exercises provided during therapy sessions    ASSESSMENT     Patient reported the only time he really felt anything in the surgical area was with lower trunk rotation stretches in hooklying. He felt a pulling/stretch but not a pain. He was able to complete the exercises without increasing pain at all. He did feel stretches in other places but nothing that was painful.    Evin  Is progressing towards his goals.   Pt prognosis is Good.     Pt will continue to benefit from skilled outpatient physical therapy to address the deficits listed in the problem list box on initial evaluation, provide pt/family education and to maximize pt's level of independence in the home and community environment.     Pt's spiritual, cultural and educational needs considered and pt agreeable to plan of care and goals.     Anticipated barriers to physical therapy: compliance with HEP    Goals:  1.   Patient will be independent with home exercise program to facilitate carryover between visits.  2.   Patient will report decrease in pain to 2/10 to improve quality of life  3.   Patient will report decrease in radiating occurrences from daily to </= 3 times a week to allow patient the       ability to perform activities of daily living   4.   Patient will increase left lower extremity strength to 5/5 to improve ambulation ability  5.   Patient will increase left hip external rotation range of motion by 10 degrees for improved mobility         PLAN     Plan of care Certification: 3/15/2022 to 4/29/2022.     Outpatient Physical Therapy 2 times weekly for 6 weeks to include the following interventions: Electrical Stimulation IFC/premod as needed, Manual Therapy, Moist Heat/ Ice, Neuromuscular Re-ed, Patient Education, Therapeutic Activities and Therapeutic Exercise.       Marla Chavez, PTA

## 2022-03-30 ENCOUNTER — CLINICAL SUPPORT (OUTPATIENT)
Dept: REHABILITATION | Facility: HOSPITAL | Age: 57
End: 2022-03-30
Attending: ORTHOPAEDIC SURGERY
Payer: COMMERCIAL

## 2022-03-30 DIAGNOSIS — Z98.1 S/P LUMBAR FUSION: Primary | ICD-10-CM

## 2022-03-30 PROCEDURE — 97112 NEUROMUSCULAR REEDUCATION: CPT | Mod: CQ

## 2022-03-30 PROCEDURE — 97110 THERAPEUTIC EXERCISES: CPT | Mod: CQ

## 2022-03-30 NOTE — PROGRESS NOTES
"[facility]   Physical Therapy Treatment Note     Name: Evin Mtz  Clinic Number: [id number]      Therapy Diagnosis:        Encounter Diagnoses   Name Primary?    Orthopedic aftercare      S/P lumbar fusion Yes      Physician: Jayden Mtz MD     Physician Orders: PT Eval and Treat   Medical Diagnosis from Referral: s/p lumbar fusion  Evaluation Date: 3/15/2022  Authorization Period Expiration: 12/31/2022  Plan of Care Expiration: 4/29/2022  Progress Note Due: 4/15/2022  Visit # / Visits authorized: 4/ 50    Visit Date: 3/30/2022    PTA Visit #: 3/5     Time In: 7:46 am  Time Out: 08:30 am  Total Billable Time: 44 minutes    SUBJECTIVE     Pt reports: He actually had the stomach virus last week and didn't realize that was why he was feeling so bad. He was running a low grade fever. His back pain is constant but different than what he feels in his legs  He was compliant with home exercise program.  Response to previous treatment: the therapy helped his back pain  Functional change: n/a    Pain: 5/10 "just enough to aggravate me"  Location: bilateral back and lower extremity down to ankles    OBJECTIVE     Objective Measures updated at progress report unless specified.     Treatment     Case conference with Ada Tsai, PT, for initial PTA visit.     Evin received the treatments listed below:      Therapeutic exercises to develop strength, ROM, flexibility, posture and core stabilization for 25 minutes including:      Back Exercises     3/23/2022 3/28/2022 3/30/2022   Bike/NuStep          NuStep 6 minutes     Nustep 6 minutes  NuStep 5 minutes   Calf Stretch 4 x 15 second hold  4 x 15 second hold  4 x 20 second hold    Hamstring Stretch bilateral  Bilateral at stairs 4 x 20 second hold ea  4 x 20 second hold  4 x 20 second hold    Pelvic Tilts During hooklying exercises -- --   Heel raises -- -- 20x   Bridging 20x over peanut 2 second hold  -- --   Hip adduction squeezes in " hooklying 20x 2 second hold -- --   Hooklying hip abduction   -- --   Sciatic nerve flossing over peanut 10x ea lower extremity  X 10 each leg  x10 each leg   Hooklying with Marching -- --     ---- --    Trunk flexion stretch with ball --- 10 x 10 second hold  10 x 10 second hold    Trunk Rotation Stretch --- 10 x 5 second hold bilateral  10 x 5 second hold bilateral    Ball - Trunk Rotation --     Ball- Knee Extension 10x ea lower extremity   10x ea lower extremity          Seated piriformis stretch Bilateral 2 x 30sh  2 x 30 second hold each leg 2 x 30 second hold each leg   DKTC stretch with towel in supine  20x 2 second hold  10 x 10 second hold  10 x 10 second hold      Neuromuscular reeducation to improve posture and kinesthetic sense activities x 20 minutes that included:   3/28/2022 3/30/2022    Pelvic tilt 10 x 10 second hold      Bridging  2 x 10 with 3 second hold  2 x 10 with 3 second hold soccer ball at knees    hooklying with march Blue x 20  Blue x 20     Hooklying with hip abduction  Blue x 20 Blue x 20    Hooklying with hip adduction  10 x 10 second hold  10 x 10 second hold     Sciatic nerve flossing   10x ea bilateral                 supervised modalities after being cleared for contradictions: IFC Electrical Stimulation:  Evin received IFC Electrical Stimulation for pain control applied to the lumbar spine. Pt received for 0 minutes with hot pack. Evin tolderated treatment well without any adverse effects.      hot pack for 0 minutes to low back.        Patient Education and Home Exercises     Home Exercises Provided and Patient Education Provided     Education provided:   - postural awareness with neutral spine alignment during exercises and with activities throughout the day    Written Home Exercises Provided: Patient instructed to cont prior HEP. Exercises were reviewed and Evin was able to demonstrate them prior to the end of the session.  Evin demonstrated good  understanding of the  education provided. See EMR under Patient Instructions for exercises provided during therapy sessions    ASSESSMENT     Patient reported the only time he really felt anything in the surgical area was with lower trunk rotation stretches in hooklying. He felt a pulling/stretch but not a pain. He was able to complete the exercises without increasing pain at all. He did feel stretches in other places but nothing that was painful. He experienced 3 spasms/ cramps in his right lower extremity during bridges with soccer ball between knees. Therapist explained importance of not holding the bridge up for sustained period since this is when he experienced the spasm/cramp. Therapist administered sciatic nerve flossing with hamstring stretch and pt felt relief with this. At conclusion of tx, pts p! Level decreased to 1/10 in his lumbar region as well as lower extremities. Will return 4/4/2022 where we will progress as tolerated within Plan of Care.     Evin Is progressing towards his goals.   Pt prognosis is Good.     Pt will continue to benefit from skilled outpatient physical therapy to address the deficits listed in the problem list box on initial evaluation, provide pt/family education and to maximize pt's level of independence in the home and community environment.     Pt's spiritual, cultural and educational needs considered and pt agreeable to plan of care and goals.     Anticipated barriers to physical therapy: compliance with HEP    Goals:  1.   Patient will be independent with home exercise program to facilitate carryover between visits.  2.   Patient will report decrease in pain to 2/10 to improve quality of life  3.   Patient will report decrease in radiating occurrences from daily to </= 3 times a week to allow patient the       ability to perform activities of daily living   4.   Patient will increase left lower extremity strength to 5/5 to improve ambulation ability  5.   Patient will increase left hip external  rotation range of motion by 10 degrees for improved mobility         PLAN     Plan of care Certification: 3/15/2022 to 4/29/2022.     Outpatient Physical Therapy 2 times weekly for 6 weeks to include the following interventions: Electrical Stimulation IFC/premod as needed, Manual Therapy, Moist Heat/ Ice, Neuromuscular Re-ed, Patient Education, Therapeutic Activities and Therapeutic Exercise.       Art Doss, PTA

## 2022-04-04 ENCOUNTER — CLINICAL SUPPORT (OUTPATIENT)
Dept: REHABILITATION | Facility: HOSPITAL | Age: 57
End: 2022-04-04
Attending: ORTHOPAEDIC SURGERY
Payer: COMMERCIAL

## 2022-04-04 DIAGNOSIS — Z98.1 S/P LUMBAR FUSION: Primary | ICD-10-CM

## 2022-04-04 PROCEDURE — 97112 NEUROMUSCULAR REEDUCATION: CPT | Mod: CQ

## 2022-04-04 PROCEDURE — 97110 THERAPEUTIC EXERCISES: CPT | Mod: CQ

## 2022-04-04 NOTE — PROGRESS NOTES
"[facility]   Physical Therapy Treatment Note     Name: Evin Mtz  Clinic Number: [id number]      Therapy Diagnosis:        Encounter Diagnoses   Name Primary?    Orthopedic aftercare      S/P lumbar fusion Yes      Physician: Jayden Mtz MD     Physician Orders: PT Eval and Treat   Medical Diagnosis from Referral: s/p lumbar fusion  Evaluation Date: 3/15/2022  Authorization Period Expiration: 12/31/2022  Plan of Care Expiration: 4/29/2022  Progress Note Due: 4/15/2022  Visit # / Visits authorized: 5 / 50    Visit Date: 4/4/2022    PTA Visit #: 4/5     Time In: 7:45 am  Time Out: 08:26 am  Total Billable Time: 41 minutes    SUBJECTIVE     Pt reports: He is stiff this morning and has pain down the back of both legs.   He was compliant with home exercise program.  Response to previous treatment: the therapy helped his back pain  Functional change: n/a    Pain: 4/10 "just enough to aggravate me"  Location: bilateral back and lower extremity down to ankles    OBJECTIVE     Objective Measures updated at progress report unless specified.     Treatment     Case conference with Ada Tsai PT, for initial PTA visit.     Evin received the treatments listed below:      Therapeutic exercises to develop strength, ROM, flexibility, posture and core stabilization for 30 minutes including:      Back Exercises     3/23/2022 3/28/2022 3/30/2022 4/4/2022   Bike/NuStep          NuStep 6 minutes     Nustep 6 minutes  NuStep 5 minutes NuStep 5 minutes   Calf Stretch 4 x 15 second hold  4 x 15 second hold  4 x 20 second hold  4 x 20 second hold    Hamstring Stretch bilateral  Bilateral at stairs 4 x 20 second hold ea  4 x 20 second hold  4 x 20 second hold  4 x 20  Second hold    Pelvic Tilts During hooklying exercises -- -- --   Heel raises -- -- 20x --   Bridging 20x over peanut 2 second hold  -- -- --   Hip adduction squeezes in hooklying 20x 2 second hold -- -- --   Hooklying hip abduction   " -- -- --   Sciatic nerve flossing over peanut 10x ea lower extremity  X 10 each leg  x10 each leg --   Hooklying with Marching -- --  --   Prone quad stretch bilateral  ---- --  4 x 20 second hold    Trunk flexion stretch with ball --- 10 x 10 second hold  10 x 10 second hold  10 x 10 second hold    Trunk Rotation Stretch --- 10 x 5 second hold bilateral  10 x 5 second hold bilateral  10 x 5 second hold    Ball - Trunk Rotation --   --   Ball- Knee Extension 10x ea lower extremity   10x ea lower extremity  20x ea lower extremity           Seated piriformis stretch Bilateral 2 x 30sh  2 x 30 second hold each leg 2 x 30 second hold each leg 3 x 30 second hold ea leg    DKTC stretch with towel in supine  20x 2 second hold  10 x 10 second hold  10 x 10 second hold  10 x 10 second hold      Neuromuscular reeducation to improve posture and kinesthetic sense activities x 10 minutes that included:   3/28/2022 3/30/2022 4/4/2022   Pelvic tilt 10 x 10 second hold   --   Bridging  2 x 10 with 3 second hold  2 x 10 with 3 second hold soccer ball at knees 2 x 10 with 2 second hold   hooklying with march Blue x 20  Blue x 20  Blue x 20   Hooklying with hip abduction  Blue x 20 Blue x 20 Blue x 20   Hooklying with hip adduction  10 x 10 second hold  10 x 10 second hold  --   Sciatic nerve flossing   10x ea bilateral  15x ea bilateral                supervised modalities after being cleared for contradictions: IFC Electrical Stimulation:  Evin received IFC Electrical Stimulation for pain control applied to the lumbar spine. Pt received for 0 minutes with hot pack. Evin tolderated treatment well without any adverse effects.      hot pack for 0 minutes to low back.        Patient Education and Home Exercises     Home Exercises Provided and Patient Education Provided     Education provided:   - postural awareness with neutral spine alignment during exercises and with activities throughout the day    Written Home Exercises Provided:  "Patient instructed to cont prior HEP. Exercises were reviewed and Evin was able to demonstrate them prior to the end of the session.  Evin demonstrated good  understanding of the education provided. See EMR under Patient Instructions for exercises provided during therapy sessions    ASSESSMENT     Patient reported pain in left lower extremity today near hip flexor that was described as a "catch". Therapist administered prone quad stretch and his left lower extremity was significantly tighter than right lower extremity. He felt a pulling/stretch but not a pain. He was able to complete the exercises without increasing pain at all. He did feel stretches in other places but nothing that was painful.  Therapist administered sciatic nerve flossing with hamstring stretch and pt felt relief with this. At conclusion of tx, pts p! Level decreased to 2/10 in his lumbar region as well as lower extremities. Will return 4/6/2022 where we will progress as tolerated within Plan of Care.     Evin Is progressing towards his goals.   Pt prognosis is Good.     Pt will continue to benefit from skilled outpatient physical therapy to address the deficits listed in the problem list box on initial evaluation, provide pt/family education and to maximize pt's level of independence in the home and community environment.     Pt's spiritual, cultural and educational needs considered and pt agreeable to plan of care and goals.     Anticipated barriers to physical therapy: compliance with HEP    Goals:  1.   Patient will be independent with home exercise program to facilitate carryover between visits.  2.   Patient will report decrease in pain to 2/10 to improve quality of life  3.   Patient will report decrease in radiating occurrences from daily to </= 3 times a week to allow patient the       ability to perform activities of daily living   4.   Patient will increase left lower extremity strength to 5/5 to improve ambulation ability  5.   " Patient will increase left hip external rotation range of motion by 10 degrees for improved mobility         PLAN     Plan of care Certification: 3/15/2022 to 4/29/2022.     Outpatient Physical Therapy 2 times weekly for 6 weeks to include the following interventions: Electrical Stimulation IFC/premod as needed, Manual Therapy, Moist Heat/ Ice, Neuromuscular Re-ed, Patient Education, Therapeutic Activities and Therapeutic Exercise.       Art Doss, PTA

## 2022-04-12 ENCOUNTER — CLINICAL SUPPORT (OUTPATIENT)
Dept: REHABILITATION | Facility: HOSPITAL | Age: 57
End: 2022-04-12
Attending: ORTHOPAEDIC SURGERY
Payer: COMMERCIAL

## 2022-04-12 DIAGNOSIS — M79.89 LEG SWELLING: Primary | ICD-10-CM

## 2022-04-12 DIAGNOSIS — M79.89 LEG SWELLING: ICD-10-CM

## 2022-04-12 DIAGNOSIS — Z98.1 S/P LUMBAR FUSION: Primary | ICD-10-CM

## 2022-04-12 DIAGNOSIS — M79.606 LEG PAIN: Primary | ICD-10-CM

## 2022-04-12 PROCEDURE — 97112 NEUROMUSCULAR REEDUCATION: CPT | Mod: CQ

## 2022-04-12 PROCEDURE — 97110 THERAPEUTIC EXERCISES: CPT | Mod: CQ

## 2022-04-12 NOTE — PROGRESS NOTES
[facility]   Physical Therapy Treatment Note     Name: Evin Mtz  Clinic Number: [id number]      Therapy Diagnosis:        Encounter Diagnoses   Name Primary?    Orthopedic aftercare      S/P lumbar fusion Yes      Physician: Jayden Mtz MD     Physician Orders: PT Eval and Treat   Medical Diagnosis from Referral: s/p lumbar fusion  Evaluation Date: 3/15/2022  Authorization Period Expiration: 12/31/2022  Plan of Care Expiration: 4/29/2022  Progress Note Due: 4/15/2022  Visit # / Visits authorized: 6 / 50    Visit Date: 4/12/2022    PTA Visit #: 5/5     Time In: 7:49 am  Time Out: 8:42 am  Total Billable Time: 50 minutes    SUBJECTIVE     Pt reports: He was hurting bad this morning when he got up. He had to take pain medicine.    He was compliant with home exercise program.  Response to previous treatment: the therapy helped his back pain  Functional change: n/a    Pain: 6/10 (in his opinion 7 means staying in bed)  Location: bilateral back and lower extremity down to ankles    OBJECTIVE     Objective Measures updated at progress report unless specified.     Treatment     Evin received the treatments listed below:      Therapeutic exercises to develop strength, ROM, flexibility, posture and core stabilization for 35 minutes including:    Back Exercises     3/28/2022 3/30/2022 4/4/2022 4/12/2022   Bike/NuStep Nustep 6 minutes  NuStep 5 minutes NuStep 5 minutes Bike 5 minutes    Calf Stretch 4 x 15 second hold  4 x 20 second hold  4 x 20 second hold  4 x 20 second hold    Hamstring Stretch bilateral  4 x 20 second hold  4 x 20 second hold  4 x 20  Second hold  4 x 20 second hold    Heel raises -- 20x -- --   Sciatic nerve flossing over peanut X 10 each leg  x10 each leg -- --   Prone quad stretch bilateral  --  4 x 20 second hold  --   Trunk flexion stretch with ball 10 x 10 second hold  10 x 10 second hold  10 x 10 second hold  10 x 10 second hold    Trunk Rotation Stretch 10 x  5 second hold bilateral  10 x 5 second hold bilateral  10 x 5 second hold  10 x 5 second hold    Ball- Knee Extension  10x ea lower extremity  20x ea lower extremity  20x each lower extremity           Seated piriformis stretch 2 x 30 second hold each leg 2 x 30 second hold each leg 3 x 30 second hold ea leg  3 x 30 second hold each leg   DKTC stretch with towel in supine  10 x 10 second hold  10 x 10 second hold  10 x 10 second hold  10 x 10 second hold      Neuromuscular reeducation to improve posture and kinesthetic sense activities x 15 minutes that included:   3/28/2022 3/30/2022 4/4/2022 4/12/2022   Pelvic tilt 10 x 10 second hold   -- --   Bridging  2 x 10 with 3 second hold  2 x 10 with 3 second hold soccer ball at knees 2 x 10 with 2 second hold 2 x 10 with 3 second hold    hooklying with march Blue x 20  Blue x 20  Blue x 20 Blue x 30   Hooklying with hip abduction  Blue x 20 Blue x 20 Blue x 20 Blue x 30    Hooklying with hip adduction  10 x 10 second hold  10 x 10 second hold  -- --   Sciatic nerve flossing   10x ea bilateral  15x ea bilateral  10x each bilateral             supervised modalities after being cleared for contradictions: IFC Electrical Stimulation:  Evin received IFC Electrical Stimulation for pain control applied to the lumbar spine. Pt received for 0 minutes with hot pack. Evin tolderated treatment well without any adverse effects.      Patient Education and Home Exercises     Home Exercises Provided and Patient Education Provided     Education provided:   - postural awareness with neutral spine alignment during exercises and with activities throughout the day    Written Home Exercises Provided: Patient instructed to cont prior HEP. Exercises were reviewed and Evin was able to demonstrate them prior to the end of the session.  Evin demonstrated good  understanding of the education provided. See EMR under Patient Instructions for exercises provided during therapy  sessions    ASSESSMENT     Patient reported more generalized pain today. He stated he always feels better after therapy for about 2 hours, then the pain returns. He was moving more stiffly today, ambulating with more of a deviation than usual.    Evin Is progressing towards his goals.   Pt prognosis is Good.     Pt will continue to benefit from skilled outpatient physical therapy to address the deficits listed in the problem list box on initial evaluation, provide pt/family education and to maximize pt's level of independence in the home and community environment.     Pt's spiritual, cultural and educational needs considered and pt agreeable to plan of care and goals.     Anticipated barriers to physical therapy: compliance with HEP    Goals:  1.   Patient will be independent with home exercise program to facilitate carryover between visits.  2.   Patient will report decrease in pain to 2/10 to improve quality of life  3.   Patient will report decrease in radiating occurrences from daily to </= 3 times a week to allow patient the       ability to perform activities of daily living   4.   Patient will increase left lower extremity strength to 5/5 to improve ambulation ability  5.   Patient will increase left hip external rotation range of motion by 10 degrees for improved mobility         PLAN     Plan of care Certification: 3/15/2022 to 4/29/2022.     Outpatient Physical Therapy 2 times weekly for 6 weeks to include the following interventions: Electrical Stimulation IFC/premod as needed, Manual Therapy, Moist Heat/ Ice, Neuromuscular Re-ed, Patient Education, Therapeutic Activities and Therapeutic Exercise.       Marla Chavez, PTA

## 2022-04-20 ENCOUNTER — CLINICAL SUPPORT (OUTPATIENT)
Dept: REHABILITATION | Facility: HOSPITAL | Age: 57
End: 2022-04-20
Attending: ORTHOPAEDIC SURGERY
Payer: COMMERCIAL

## 2022-04-20 DIAGNOSIS — Z98.1 S/P LUMBAR FUSION: Primary | ICD-10-CM

## 2022-04-20 PROCEDURE — 97112 NEUROMUSCULAR REEDUCATION: CPT | Mod: CQ

## 2022-04-20 PROCEDURE — 97140 MANUAL THERAPY 1/> REGIONS: CPT

## 2022-04-20 PROCEDURE — 97110 THERAPEUTIC EXERCISES: CPT | Mod: CQ

## 2022-04-20 NOTE — PROGRESS NOTES
[facility]   Physical Therapy Treatment Note     Name: Evin Mtz  Clinic Number: [id number]      Therapy Diagnosis:        Encounter Diagnoses   Name Primary?    Orthopedic aftercare      S/P lumbar fusion Yes      Physician: Jayden Mtz MD     Physician Orders: PT Eval and Treat   Medical Diagnosis from Referral: s/p lumbar fusion  Evaluation Date: 3/15/2022  Authorization Period Expiration: 12/31/2022  Plan of Care Expiration: 4/29/2022  Progress Note Due: 4/15/2022  Visit # / Visits authorized: 7 / 50    Visit Date: 4/20/2022    PTA Visit #: cotreatment      Time In: 7:46 am  Time Out: 8:26 am  Total Billable Time: 40 minutes    SUBJECTIVE     Pt reports: He is mainly stiff this morning.  He has been having difficulty sleeping due to restless feelings in his legs.  He was compliant with home exercise program.  Response to previous treatment: the therapy helped his back pain  Functional change: n/a    Pain: 3/10 (he took pain meds at 4 am)  Location: bilateral back and lower extremity down to ankles    OBJECTIVE     Objective Measures updated at progress report unless specified.     Treatment     Evin received the treatments listed below:      Therapeutic exercises to develop strength, ROM, flexibility, posture and core stabilization for 25 minutes including:    Back Exercises     4/4/2022 4/12/2022 4/20/2022    Bike/NuStep NuStep 5 minutes Bike 5 minutes  Bike 5 minutes     Calf Stretch 4 x 20 second hold  4 x 20 second hold  4 x 20 second hold     Hamstring Stretch bilateral  4 x 20  Second hold  4 x 20 second hold  4 x 20 second hold     Prone quad stretch bilateral  4 x 20 second hold  -- 4 x 20 second hold     Trunk flexion stretch with ball 10 x 10 second hold  10 x 10 second hold  --    Trunk Rotation Stretch 10 x 5 second hold  10 x 5 second hold  --    Ball- Knee Extension 20x ea lower extremity  20x each lower extremity  --           Seated piriformis stretch 3 x  30 second hold ea leg  3 x 30 second hold each leg 3 x 30 second hold each leg    DKTC stretch with towel in supine  10 x 10 second hold  10 x 10 second hold  10 x 10 second hold       Neuromuscular reeducation to improve posture and kinesthetic sense activities x 15 minutes that included:   4/4/2022 4/12/2022 4/20/2022    Bridging  2 x 10 with 2 second hold 2 x 10 with 3 second hold  With hip abduction with blue x 30    hooklying with march Blue x 20 Blue x 30 Blue x 30    Hooklying with hip abduction  Blue x 20 Blue x 30  -- above    Sciatic nerve flossing  15x ea bilateral  10x each bilateral  10 x each bilateral              supervised modalities after being cleared for contradictions: IFC Electrical Stimulation:  Evin received IFC Electrical Stimulation for pain control applied to the lumbar spine. Pt received for 0 minutes with hot pack. Evin tolerated treatment well without any adverse effects.      Patient Education and Home Exercises     Home Exercises Provided and Patient Education Provided     Education provided:   - postural awareness with neutral spine alignment during exercises and with activities throughout the day    Written Home Exercises Provided: Patient instructed to cont prior HEP. Exercises were reviewed and Evin was able to demonstrate them prior to the end of the session.  Evin demonstrated good  understanding of the education provided. See EMR under Patient Instructions for exercises provided during therapy sessions    ASSESSMENT     Cotreatment with [facility], for supervisory visit. Patient is ambulating better today and reports a decrease in pain since beginning therapy. Patient was reassessed by therapist.     Evin Is progressing towards his goals.   Pt prognosis is Good.     Pt will continue to benefit from skilled outpatient physical therapy to address the deficits listed in the problem list box on initial evaluation, provide pt/family education and to maximize pt's level of  independence in the home and community environment.     Pt's spiritual, cultural and educational needs considered and pt agreeable to plan of care and goals.     Anticipated barriers to physical therapy: compliance with HEP    Goals:  1.   Patient will be independent with home exercise program to facilitate carryover between visits.  2.   Patient will report decrease in pain to 2/10 to improve quality of life  3.   Patient will report decrease in radiating occurrences from daily to </= 3 times a week to allow patient the ability to perform activities of daily living   4.   Patient will increase left lower extremity strength to 5/5 to improve ambulation ability  5.   Patient will increase left hip external rotation range of motion by 10 degrees for improved mobility         PLAN     Plan of care Certification: 3/15/2022 to 4/29/2022.     Outpatient Physical Therapy 2 times weekly for 6 weeks to include the following interventions: Electrical Stimulation IFC/premod as needed, Manual Therapy, Moist Heat/ Ice, Neuromuscular Re-ed, Patient Education, Therapeutic Activities and Therapeutic Exercise.       Marla Chavez, PTA

## 2022-04-21 ENCOUNTER — HOSPITAL ENCOUNTER (OUTPATIENT)
Dept: RADIOLOGY | Facility: HOSPITAL | Age: 57
Discharge: HOME | End: 2022-04-21
Attending: SURGERY
Payer: COMMERCIAL

## 2022-04-21 DIAGNOSIS — M79.606 LEG PAIN: ICD-10-CM

## 2022-04-21 DIAGNOSIS — M79.89 LEG SWELLING: ICD-10-CM

## 2022-04-21 PROCEDURE — 93970 EXTREMITY STUDY: CPT | Mod: 26,,, | Performed by: FAMILY MEDICINE

## 2022-04-21 PROCEDURE — 93970 US VENOUS REFLUX STUDY BILATERAL: ICD-10-PCS | Mod: 26,,, | Performed by: FAMILY MEDICINE

## 2022-04-21 PROCEDURE — 93970 EXTREMITY STUDY: CPT | Mod: TC

## 2022-04-22 ENCOUNTER — HOSPITAL ENCOUNTER (OUTPATIENT)
Dept: RADIOLOGY | Facility: HOSPITAL | Age: 57
Discharge: HOME | End: 2022-04-22
Attending: SURGERY
Payer: COMMERCIAL

## 2022-04-22 DIAGNOSIS — M79.89 LEG SWELLING: ICD-10-CM

## 2022-04-22 PROCEDURE — 75635 CT VENOGRAPHY PELVIS LOWER EXTREMITY W/CONTRAST: ICD-10-PCS | Mod: 26,,, | Performed by: RADIOLOGY

## 2022-04-22 PROCEDURE — 75635 CT ANGIO ABDOMINAL ARTERIES: CPT | Mod: 26,,, | Performed by: RADIOLOGY

## 2022-04-22 PROCEDURE — 75635 CT ANGIO ABDOMINAL ARTERIES: CPT | Mod: TC

## 2022-04-22 PROCEDURE — 25500020 PHARM REV CODE 255

## 2022-04-22 RX ADMIN — IOPAMIDOL 100 ML: 755 INJECTION, SOLUTION INTRAVENOUS at 09:04

## 2022-04-25 ENCOUNTER — CLINICAL SUPPORT (OUTPATIENT)
Dept: REHABILITATION | Facility: HOSPITAL | Age: 57
End: 2022-04-25
Attending: ORTHOPAEDIC SURGERY
Payer: COMMERCIAL

## 2022-04-25 DIAGNOSIS — Z98.1 S/P LUMBAR FUSION: Primary | ICD-10-CM

## 2022-04-25 PROCEDURE — 97110 THERAPEUTIC EXERCISES: CPT

## 2022-04-25 PROCEDURE — 97112 NEUROMUSCULAR REEDUCATION: CPT

## 2022-04-25 NOTE — PROGRESS NOTES
"[facility]   Physical Therapy Treatment Note     Name: Evin Mtz  Clinic Number: [id number]      Therapy Diagnosis:        Encounter Diagnoses   Name Primary?    Orthopedic aftercare      S/P lumbar fusion Yes      Physician: Jayden Mtz MD     Physician Orders: PT Eval and Treat   Medical Diagnosis from Referral: s/p lumbar fusion  Evaluation Date: 3/15/2022  Authorization Period Expiration: 12/31/2022  Plan of Care Expiration: 4/29/2022  Progress Note Due: 4/15/2022  Visit # / Visits authorized: 8/50    Visit Date: 4/25/2022    PTA Visit #:     Time In: 825 am  Time Out: 917 am  Total Billable Time: 48 minutes    SUBJECTIVE     Pt reports: he is doing ok this morning - "had a mis-step on his left leg on the way in and thought he was going to hit the floor"  He was compliant with home exercise program.  Response to previous treatment: the therapy helped his back pain  Functional change: n/a    Pain: 3/10   Location: bilateral back and lower extremity down to ankles    OBJECTIVE     Objective Measures updated at progress report unless specified.     Treatment     Evin received the treatments listed below:      Therapeutic exercises to develop strength, ROM, flexibility, posture and core stabilization for 36 minutes including:    Back Exercises     4/4/2022 4/12/2022 4/20/2022 4/25/2022   Bike/NuStep NuStep 5 minutes Bike 5 minutes  Bike 5 minutes  6 minutes NuStep   Calf Stretch 4 x 20 second hold  4 x 20 second hold  4 x 20 second hold  3 minutes   Hamstring Stretch bilateral  4 x 20  Second hold  4 x 20 second hold  4 x 20 second hold  4 x 20 second hold each   Prone quad stretch bilateral  4 x 20 second hold  -- 4 x 20 second hold  --   Trunk flexion stretch with ball 10 x 10 second hold  10 x 10 second hold  -- 5 x 10 second hold each (fwd/rt/lt)   Trunk Rotation Stretch 10 x 5 second hold  10 x 5 second hold  -- 4 x 20 second hold    Ball- Knee Extension 20x ea lower " extremity  20x each lower extremity  -- --          Seated piriformis stretch 3 x 30 second hold ea leg  3 x 30 second hold each leg 3 x 30 second hold each leg 4 x 20 second hold each leg   DKTC stretch with towel in supine  10 x 10 second hold  10 x 10 second hold  10 x 10 second hold  4 x 20 second hold      Neuromuscular reeducation to improve posture and kinesthetic sense activities x 12 minutes that included:   4/4/2022 4/12/2022 4/20/2022 4/25/2022   Bridging  2 x 10 with 2 second hold 2 x 10 with 3 second hold  With hip abduction with blue x 30 W/ hip abduction gray x 30   hooklying with march Blue x 20 Blue x 30 Blue x 30 Gray x 30 each leg   Hooklying with hip abduction  Blue x 20 Blue x 30  -- above --   Sciatic nerve flossing  15x ea bilateral  10x each bilateral  10 x each bilateral  5 x 10 ankle pumps each leg            supervised modalities after being cleared for contradictions: IFC Electrical Stimulation:  Evin received IFC Electrical Stimulation for pain control applied to the lumbar spine. Pt received for 0 minutes with hot pack. Evin tolerated treatment well without any adverse effects.      Patient Education and Home Exercises     Home Exercises Provided and Patient Education Provided     Education provided:   - postural awareness with neutral spine alignment during exercises and with activities throughout the day    Written Home Exercises Provided: Patient instructed to cont prior HEP. Exercises were reviewed and Evin was able to demonstrate them prior to the end of the session.  Evin demonstrated good  understanding of the education provided. See EMR under Patient Instructions for exercises provided during therapy sessions    ASSESSMENT     Evin was able to get his left lower extremity on the NuStep without having to lift it with his arms today. 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 We are continuing to work on lumbar stabilization, core strengthening and flexibility.    Evin Is progressing towards his goals.   Pt prognosis is Good.     Pt will continue to benefit from skilled outpatient physical therapy to address the deficits listed in the problem list box on initial evaluation, provide pt/family education and to maximize pt's level of independence in the home and community environment.     Pt's spiritual, cultural and educational needs considered and pt agreeable to plan of care and goals.     Anticipated barriers to physical therapy: compliance with HEP    Goals:  1.   Patient will be independent with home exercise program to facilitate carryover between visits.  2.   Patient will report decrease in pain to 2/10 to improve quality of life  3.   Patient will report decrease in radiating occurrences from daily to </= 3 times a week to allow patient the ability to perform activities of daily living   4.   Patient will increase left lower extremity strength to 5/5 to improve ambulation ability  5.   Patient will increase left hip external rotation range of motion by 10 degrees for improved mobility         PLAN     Plan of care Certification: 3/15/2022 to 4/29/2022.     Outpatient Physical Therapy 2 times weekly for 6 weeks to include the following interventions: Electrical Stimulation IFC/premod as needed, Manual Therapy, Moist Heat/ Ice, Neuromuscular Re-ed, Patient Education, Therapeutic Activities and Therapeutic Exercise.       KEHINDE DING, PT

## 2022-04-25 NOTE — PROGRESS NOTES
[facility]   Physical Therapy Progress Note      Name: Evin Mtz  Clinic Number: [id number]        Therapy Diagnosis:           Encounter Diagnoses   Name Primary?    Orthopedic aftercare      S/P lumbar fusion Yes      Physician: Jayden Mtz MD     Physician Orders: PT Eval and Treat   Medical Diagnosis from Referral: s/p lumbar fusion  Evaluation Date: 3/15/2022  Authorization Period Expiration: 12/31/2022  Plan of Care Expiration: 4/29/2022  Progress Note Due: 4/15/2022  Visit # / Visits authorized: 7 / 50     Visit Date: 4/20/2022    Time In: 826 am  Time Out: 838 am  Total Billable Time: 12 minutes      SUBJECTIVE      Pt reports: He is mainly stiff this morning.  He has been having difficulty sleeping due to restless feelings in his legs.  He was compliant with home exercise program.  Response to previous treatment: the therapy helped his back pain  Functional change: n/a     Pain: 3/10 (he took pain meds at 4 am)  Location: bilateral back and lower extremity down to ankles     OBJECTIVE      Patient was reassessed by PT today during a cotreatment with the PTA. This reassessment took 12 minutes. He reports his pain still varies from 2/10 at best to 8/10 at worst. He describes the pain in his back as sharp but the pain in his legs as more of a dull ache. His hip range of motion has improved by 5 degrees with external rotation, internal rotation and flexion to 40 degrees, 25 degrees and 95 degrees respectively. His hip flexion strength has increased from 4-/5 to 4/5 and hip abduction strength has increased from 4/5 to 4+/5. His knee and ankle strength is 5/5. - 1 manual charge for reassessment     Treatment      Evin received the treatments listed below:       Therapeutic exercises to develop strength, ROM, flexibility, posture and core stabilization for 25 minutes including: - this treatment performed by Marla Chavez PTA     Back Exercises       4/4/2022 4/12/2022  4/20/2022     Bike/NuStep NuStep 5 minutes Bike 5 minutes  Bike 5 minutes      Calf Stretch 4 x 20 second hold  4 x 20 second hold  4 x 20 second hold      Hamstring Stretch bilateral  4 x 20  Second hold  4 x 20 second hold  4 x 20 second hold      Prone quad stretch bilateral  4 x 20 second hold  -- 4 x 20 second hold      Trunk flexion stretch with ball 10 x 10 second hold  10 x 10 second hold  --     Trunk Rotation Stretch 10 x 5 second hold  10 x 5 second hold  --     Ball- Knee Extension 20x ea lower extremity  20x each lower extremity  --                 Seated piriformis stretch 3 x 30 second hold ea leg  3 x 30 second hold each leg 3 x 30 second hold each leg     DKTC stretch with towel in supine  10 x 10 second hold  10 x 10 second hold  10 x 10 second hold         Neuromuscular reeducation to improve posture and kinesthetic sense activities x 15 minutes that included: - this treatment performed by Marla Chavez PTA    4/4/2022 4/12/2022 4/20/2022     Bridging  2 x 10 with 2 second hold 2 x 10 with 3 second hold  With hip abduction with blue x 30     hooklying with march Blue x 20 Blue x 30 Blue x 30     Hooklying with hip abduction  Blue x 20 Blue x 30  -- above     Sciatic nerve flossing  15x ea bilateral  10x each bilateral  10 x each bilateral                     supervised modalities after being cleared for contradictions: IFC Electrical Stimulation:  Evin received IFC Electrical Stimulation for pain control applied to the lumbar spine. Pt received for 0 minutes with hot pack. Evin tolerated treatment well without any adverse effects.       Patient Education and Home Exercises      Home Exercises Provided and Patient Education Provided      Education provided:   - postural awareness with neutral spine alignment during exercises and with activities throughout the day     Written Home Exercises Provided: Patient instructed to cont prior HEP. Exercises were reviewed and Evin was able to demonstrate  "them prior to the end of the session.  Evin demonstrated good understanding of the education provided. See EMR under Patient Instructions for exercises provided during therapy sessions     ASSESSMENT      Cotreatment with Marla Chavez PTA for supervisory visit.  Patient was reassessed today checking range of motion, strength and assessing pain. He overall reports improvement in pain. 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 See assessment notes under "objective" section above and updated progress toward goals below.     Evin Is progressing towards his goals.   Pt prognosis is Good.      Pt will continue to benefit from skilled outpatient physical therapy to address the deficits listed in the problem list box on initial evaluation, provide pt/family education and to maximize pt's level of independence in the home and community environment.      Pt's spiritual, cultural and educational needs considered and pt agreeable to plan of care and goals.     Anticipated barriers to physical therapy: compliance with HEP     Goals:  1.   Patient will be independent with home exercise program to facilitate carryover between visits. MET  2.   Patient will report decrease in pain to 2/10 to improve quality of life. ONGOING - he has times where the pain is down to a 2/10 but he also still has periods where it is 8/10  3.   Patient will report decrease in radiating occurrences from daily to </= 3 times a week to allow patient the ability to perform activities of daily living. NOT MET  4.   Patient will increase left lower extremity strength to 5/5 to improve ambulation ability. MET for knee and ankle; hip flexion 4/5, hip abduction 4+/5  5.   Patient will increase left hip external rotation range of motion by 10 degrees for improved mobility. IMPROVING - has increased by 5 degrees            PLAN "      Plan of care Certification: 3/15/2022 to 4/29/2022.     Outpatient Physical Therapy 2 times weekly for 6 weeks to include the following interventions: Electrical Stimulation IFC/premod as needed, Manual Therapy, Moist Heat/ Ice, Neuromuscular Re-ed, Patient Education, Therapeutic Activities and Therapeutic Exercise.

## 2022-04-27 ENCOUNTER — CLINICAL SUPPORT (OUTPATIENT)
Dept: REHABILITATION | Facility: HOSPITAL | Age: 57
End: 2022-04-27
Attending: ORTHOPAEDIC SURGERY
Payer: COMMERCIAL

## 2022-04-27 DIAGNOSIS — Z98.1 S/P LUMBAR FUSION: Primary | ICD-10-CM

## 2022-04-27 PROCEDURE — 97112 NEUROMUSCULAR REEDUCATION: CPT | Mod: CQ

## 2022-04-27 PROCEDURE — 97110 THERAPEUTIC EXERCISES: CPT | Mod: CQ

## 2022-04-27 NOTE — PROGRESS NOTES
[facility]   Physical Therapy Treatment Note     Name: Evin Mtz  Clinic Number: [id number]      Therapy Diagnosis:        Encounter Diagnoses   Name Primary?    Orthopedic aftercare      S/P lumbar fusion Yes      Physician: Jayden Mtz MD     Physician Orders: PT Eval and Treat   Medical Diagnosis from Referral: s/p lumbar fusion  Evaluation Date: 3/15/2022  Authorization Period Expiration: 12/31/2022  Plan of Care Expiration: 4/29/2022  Progress Note Due: 4/15/2022  Visit # / Visits authorized: 9/50    Visit Date: 4/27/2022    PTA Visit #: 1    Time In: 7:50 am  Time Out: 8:36 am  Total Billable Time: 46 minutes    SUBJECTIVE     Pt reports: he is doing pretty good today. He complained of swelling in his legs this morning.  He was compliant with home exercise program.  Response to previous treatment: the therapy helped his back pain  Functional change: n/a    Pain: 3/10   Location: bilateral back and lower extremity down to ankles    OBJECTIVE     Objective Measures updated at progress report unless specified.     Treatment     Evin received the treatments listed below:      Therapeutic exercises to develop strength, ROM, flexibility, posture and core stabilization for 34 minutes including:    Back Exercises     4/20/2022 4/25/2022 4/27/2022    Bike/NuStep Bike 5 minutes  6 minutes NuStep Bike 5 minutes     Calf Stretch 4 x 20 second hold  3 minutes 3 minutes     Hamstring Stretch bilateral  4 x 20 second hold  4 x 20 second hold each 4 x 20 second hold each    Prone quad stretch bilateral  4 x 20 second hold  --     Trunk flexion stretch with ball -- 5 x 10 second hold each (fwd/rt/lt) 5 x 10 second hold each            3 way    Trunk Rotation Stretch -- 4 x 20 second hold  4 x 20 second hold     Ball- Knee Extension -- --            Seated piriformis stretch 3 x 30 second hold each leg 4 x 20 second hold each leg 4 x 20 second hold each leg    DKTC stretch with towel in  supine  10 x 10 second hold  4 x 20 second hold  4 x 20 second hold       Neuromuscular reeducation to improve posture and kinesthetic sense activities x 12 minutes that included:   4/20/2022 4/25/2022 4/27/2022    Bridging  With hip abduction with blue x 30 W/ hip abduction gray x 30 With hip abduction gray x 30    hooklying with march Blue x 30 Gray x 30 each leg Gray x 30 each leg    Sciatic nerve flossing  10 x each bilateral  5 x 10 ankle pumps each leg 5 x 10 ankle pumps each leg             supervised modalities after being cleared for contradictions: IFC Electrical Stimulation:  Evin received IFC Electrical Stimulation for pain control applied to the lumbar spine. Pt received for 0 minutes with hot pack. Evin tolerated treatment well without any adverse effects.      Patient Education and Home Exercises     Home Exercises Provided and Patient Education Provided     Education provided:   - postural awareness with neutral spine alignment during exercises and with activities throughout the day    Written Home Exercises Provided: Patient instructed to cont prior HEP. Exercises were reviewed and Evin was able to demonstrate them prior to the end of the session.  Evin demonstrated good  understanding of the education provided. See EMR under Patient Instructions for exercises provided during therapy sessions    ASSESSMENT     Evin is moving better but piriformis was tighter today - he struggled with piriformis stretch on left. He was able to extend his knees for sciatic nerve glides with much more ease than usual. We are continuing to work on lumbar stabilization, core strengthening and flexibility.     Evin Is progressing towards his goals.   Pt prognosis is Good.     Pt will continue to benefit from skilled outpatient physical therapy to address the deficits listed in the problem list box on initial evaluation, provide pt/family education and to maximize pt's level of independence in the home and community  environment.     Pt's spiritual, cultural and educational needs considered and pt agreeable to plan of care and goals.     Anticipated barriers to physical therapy: compliance with HEP    Goals:  1.   Patient will be independent with home exercise program to facilitate carryover between visits.  2.   Patient will report decrease in pain to 2/10 to improve quality of life  3.   Patient will report decrease in radiating occurrences from daily to </= 3 times a week to allow patient the ability to perform activities of daily living   4.   Patient will increase left lower extremity strength to 5/5 to improve ambulation ability  5.   Patient will increase left hip external rotation range of motion by 10 degrees for improved mobility         PLAN     Continue Plan of Care for Outpatient Physical Therapy 2 times weekly for 6 weeks to include the following interventions: Electrical Stimulation IFC/premod as needed, Manual Therapy, Moist Heat/ Ice, Neuromuscular Re-ed, Patient Education, Therapeutic Activities and Therapeutic Exercise.       Marla Chavez, PTA

## 2022-06-08 ENCOUNTER — HOSPITAL ENCOUNTER (OUTPATIENT)
Dept: RADIOLOGY | Facility: HOSPITAL | Age: 57
Discharge: HOME | End: 2022-06-08
Attending: ORTHOPAEDIC SURGERY
Payer: COMMERCIAL

## 2022-06-08 ENCOUNTER — OFFICE VISIT (OUTPATIENT)
Dept: SPINE | Facility: CLINIC | Age: 57
End: 2022-06-08
Payer: COMMERCIAL

## 2022-06-08 DIAGNOSIS — M43.16 SPONDYLOLISTHESIS, LUMBAR REGION: Primary | ICD-10-CM

## 2022-06-08 DIAGNOSIS — M54.9 DORSALGIA, UNSPECIFIED: ICD-10-CM

## 2022-06-08 DIAGNOSIS — M54.16 LUMBAR RADICULOPATHY: ICD-10-CM

## 2022-06-08 DIAGNOSIS — M51.36 DDD (DEGENERATIVE DISC DISEASE), LUMBAR: ICD-10-CM

## 2022-06-08 DIAGNOSIS — M54.16 LUMBAR RADICULOPATHY, CHRONIC: ICD-10-CM

## 2022-06-08 PROCEDURE — 99214 OFFICE O/P EST MOD 30 MIN: CPT | Mod: S$PBB,,, | Performed by: ORTHOPAEDIC SURGERY

## 2022-06-08 PROCEDURE — 72100 X-RAY EXAM L-S SPINE 2/3 VWS: CPT | Mod: TC

## 2022-06-08 PROCEDURE — 72100 XR LUMBAR SPINE AP AND LATERAL: ICD-10-PCS | Mod: 26,,, | Performed by: ORTHOPAEDIC SURGERY

## 2022-06-08 PROCEDURE — 99214 PR OFFICE/OUTPT VISIT, EST, LEVL IV, 30-39 MIN: ICD-10-PCS | Mod: S$PBB,,, | Performed by: ORTHOPAEDIC SURGERY

## 2022-06-08 PROCEDURE — 72100 X-RAY EXAM L-S SPINE 2/3 VWS: CPT | Mod: 26,,, | Performed by: ORTHOPAEDIC SURGERY

## 2022-06-08 PROCEDURE — 99212 OFFICE O/P EST SF 10 MIN: CPT | Mod: PBBFAC | Performed by: ORTHOPAEDIC SURGERY

## 2022-06-12 NOTE — PROGRESS NOTES
MDM/time:  Greater than 30 minutes spent on this encounter including 10 minutes reviewing imaging and notes, 15 minutes with the patient, 5 minutes documentation    ASSESSMENT:  57 y.o. male with lumbar spondylolisthesis and radiculopathy now status post L4-5 anterior lumbar interbody fusion above his prior L5-S1 posterior laminectomy fusion 10/05/2021    PLAN:  MRI lumbar spine    HPI:  57 y.o. male here for repeat evaluation of lumbar spondylolisthesis and radiculopathy now status post L4-5 anterior lumbar interbody fusion above his prior L5-S1 posterior laminectomy fusion 10/05/2021 reports that his preoperative radicular pain has improved.  He has completed physical therapy without any lasting relief.  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    IMAGING:  X-rays lumbar spine reviewed show:  On the AP there is normal coronal alignment.  There are 5 non-rib-bearing lumbar vertebrae.  On the lateral there is maintained lumbar lordosis.  There has been prior L5-S1 laminectomy and fusion.  He is status post L4-5 anterior lumbar interbody fusion.  No signs of hardware loosening or failure.    Past Medical History:   Diagnosis Date    GERD (gastroesophageal reflux disease)     Hypertension      Past Surgical History:   Procedure Laterality Date    ANTERIOR LUMBAR INTERBODY FUSION (ALIF) N/A 10/5/2021    Procedure: L4-L5 anterior lumbar interbody fusion;  Surgeon: Jayden Mtz MD;  Location: [facility] [place];  Service: Neurosurgery;  Laterality: N/A;    ILIAC ARTERY STENT      JOINT REPLACEMENT Right     tka    LAPAROSCOPIC REPAIR OF HIATAL HERNIA      SINUS SURGERY       Social History     Tobacco Use    Smoking status: Never Smoker    Smokeless tobacco: Never Used   Substance Use Topics    Alcohol use: Never    Drug use: Never      Current Outpatient Medications   Medication Instructions    baclofen (LIORESAL) 10 MG tablet No dose, route, or frequency  recorded.    clindamycin phosphate 1% (CLINDAGEL) 1 % gel APPLY TO AFFECTED AREA TWICE DAILY    dextroamphetamine-amphetamine (ADDERALL) 20 mg tablet 1 tablet, Oral, 2 times daily    esomeprazole (NEXIUM) 40 MG capsule TAKE ONE (1) CAPSULE (40 MG TOTAL) BY MOUTH BEFORE BREAKFAST.    furosemide (LASIX) 40 mg, Oral, Daily    ketoconazole (NIZORAL) 2 % cream Topical (Top), Daily    lisinopriL 10 mg, Oral, Daily    oxyCODONE-acetaminophen (PERCOCET)  mg per tablet 1 tablet, Oral, Every 4 hours PRN    pregabalin (LYRICA) 150 MG capsule No dose, route, or frequency recorded.    promethazine (PHENERGAN) 25 mg, Oral, Every 4 hours    rifAMpin (RIFADIN) 300 mg, Oral, Every 12 hours    tadalafiL (CIALIS) 20 mg, Oral, Daily PRN    tiZANidine (ZANAFLEX) 4 MG tablet No dose, route, or frequency recorded.    triamcinolone acetonide 0.1% (KENALOG) 0.1 % ointment Topical (Top), 2 times daily        EXAM:  Constitutional  General Appearance:  There is no height or weight on file to calculate BMI., NAD  Psychiatric   Orientation: Oriented to time, oriented to place, oriented to person  Mood and Affect: Active and alert, normal mood, normal affect  Gait and Station   Appearance:  Normal gait, normal tandem gait, able to walk on toes, able to walk on heels  Healed incision  5/5 strength  Sensation intact  2+ pulses

## 2022-07-13 ENCOUNTER — OFFICE VISIT (OUTPATIENT)
Dept: INTERNAL MEDICINE | Facility: CLINIC | Age: 57
End: 2022-07-13
Payer: COMMERCIAL

## 2022-07-13 VITALS
DIASTOLIC BLOOD PRESSURE: 70 MMHG | HEART RATE: 76 BPM | OXYGEN SATURATION: 97 % | SYSTOLIC BLOOD PRESSURE: 140 MMHG | BODY MASS INDEX: 31.99 KG/M2 | RESPIRATION RATE: 16 BRPM | HEIGHT: 69 IN | WEIGHT: 216 LBS

## 2022-07-13 DIAGNOSIS — L30.8 ASTEATOTIC ECZEMA: ICD-10-CM

## 2022-07-13 DIAGNOSIS — K21.9 GASTROESOPHAGEAL REFLUX DISEASE, UNSPECIFIED WHETHER ESOPHAGITIS PRESENT: ICD-10-CM

## 2022-07-13 DIAGNOSIS — I10 ESSENTIAL HYPERTENSION: ICD-10-CM

## 2022-07-13 DIAGNOSIS — L28.1 PRURIGO NODULARIS: ICD-10-CM

## 2022-07-13 DIAGNOSIS — I87.2 VENOUS INSUFFICIENCY: ICD-10-CM

## 2022-07-13 DIAGNOSIS — G89.4 CHRONIC PAIN SYNDROME: ICD-10-CM

## 2022-07-13 DIAGNOSIS — I73.9 PVD (PERIPHERAL VASCULAR DISEASE): Chronic | ICD-10-CM

## 2022-07-13 DIAGNOSIS — Z09 FOLLOW UP: Primary | ICD-10-CM

## 2022-07-13 PROCEDURE — 99214 PR OFFICE/OUTPT VISIT, EST, LEVL IV, 30-39 MIN: ICD-10-PCS | Mod: S$PBB,,, | Performed by: INTERNAL MEDICINE

## 2022-07-13 PROCEDURE — 99215 OFFICE O/P EST HI 40 MIN: CPT | Mod: PBBFAC | Performed by: INTERNAL MEDICINE

## 2022-07-13 PROCEDURE — 1159F MED LIST DOCD IN RCRD: CPT | Mod: CPTII,,, | Performed by: INTERNAL MEDICINE

## 2022-07-13 PROCEDURE — 3008F PR BODY MASS INDEX (BMI) DOCUMENTED: ICD-10-PCS | Mod: CPTII,,, | Performed by: INTERNAL MEDICINE

## 2022-07-13 PROCEDURE — 3077F SYST BP >= 140 MM HG: CPT | Mod: CPTII,,, | Performed by: INTERNAL MEDICINE

## 2022-07-13 PROCEDURE — 3008F BODY MASS INDEX DOCD: CPT | Mod: CPTII,,, | Performed by: INTERNAL MEDICINE

## 2022-07-13 PROCEDURE — 3078F DIAST BP <80 MM HG: CPT | Mod: CPTII,,, | Performed by: INTERNAL MEDICINE

## 2022-07-13 PROCEDURE — 3077F PR MOST RECENT SYSTOLIC BLOOD PRESSURE >= 140 MM HG: ICD-10-PCS | Mod: CPTII,,, | Performed by: INTERNAL MEDICINE

## 2022-07-13 PROCEDURE — 1159F PR MEDICATION LIST DOCUMENTED IN MEDICAL RECORD: ICD-10-PCS | Mod: CPTII,,, | Performed by: INTERNAL MEDICINE

## 2022-07-13 PROCEDURE — 99214 OFFICE O/P EST MOD 30 MIN: CPT | Mod: S$PBB,,, | Performed by: INTERNAL MEDICINE

## 2022-07-13 PROCEDURE — 3078F PR MOST RECENT DIASTOLIC BLOOD PRESSURE < 80 MM HG: ICD-10-PCS | Mod: CPTII,,, | Performed by: INTERNAL MEDICINE

## 2022-07-13 RX ORDER — TRIAMCINOLONE ACETONIDE 1 MG/G
OINTMENT TOPICAL 2 TIMES DAILY
Qty: 80 G | Refills: 5 | Status: SHIPPED | OUTPATIENT
Start: 2022-07-13

## 2022-07-13 RX ORDER — FAMOTIDINE 10 MG/1
10 TABLET ORAL 2 TIMES DAILY
COMMUNITY
End: 2022-11-09

## 2022-07-13 RX ORDER — CLINDAMYCIN PHOSPHATE AND BENZOYL PEROXIDE 10; 50 MG/G; MG/G
GEL TOPICAL 2 TIMES DAILY
Qty: 45 G | Refills: 2 | Status: SHIPPED | OUTPATIENT
Start: 2022-07-13 | End: 2022-10-19 | Stop reason: SDUPTHER

## 2022-07-13 RX ORDER — HYDROCODONE BITARTRATE AND ACETAMINOPHEN 10; 325 MG/1; MG/1
1 TABLET ORAL
Status: ON HOLD | COMMUNITY
Start: 2022-06-21 | End: 2022-11-10

## 2022-07-13 NOTE — PROGRESS NOTES
Subjective:       Patient ID: Evin Mtz is a 57 y.o. male.    Chief Complaint: Follow-up (6 month follow; requesting different antibiotic cream from spots on his arms/Request PSA, CBC, CMP, Lipid profile) and Polydipsia    The patient is a 56-year-old white male the presents today to establish.  We saw him about 6 weeks ago for surgical pre-op risk stratification. He underwent L4-L5 fusion on October 5th. He is recovering well but still has some pain. He has a history of hypertension, GERD, venous insufficiency.  No known history of coronary artery disease, cerebrovascular disease, or CHF, he denies any chest pain at rest or with moderate exertion.  He had a left heart catheterization around 2018 that showed normal coronaries.  He underwent right total knee replacement in 2020 without any complications.  No significant changes in his health since then.  He has chronic bilateral lower extremity edema.  He had a stent to the iliac vein on the right.  This did not improve his symptoms.  He has been seen by vascular surgery and also by vein specialist without much improvement in his symptoms.  Today he is resting comfortably in no distress.  He is afebrile and vital signs are stable.    1/24-the patient presents today for follow-up.  His biggest issue right now is swelling in his left lower extremity.  He has been seen by the vein clinic and they recommended lymphatic pumps but the patient states that he does not have time to do this.  He wants to know if there is anything that can be done to fix this.  He would like to be referred back to Dr. Stern in [facility].  He states the Lasix does help some.  He does need a refill on this.  Outside of his swelling is doing okay.  Blood pressure looks good.  He is resting comfortably in no distress.  He is afebrile vital signs are stable.    7/13- The patient presents today for follow up. Since we last saw him he has been seen by vascular clinic in [place]. They  believe that his venous stent needs to be increased. Once his wife heals up from recent surgery he is going to schedule an appointment to have this done. He continues to have issues with his prurigo nodularis with infection at times. He is not currently using a steroid cream. Only the clindamycin gel. He is on lyrica for chronic pain but this may also provide some benefit for the prurigo nodularis. Otherwise the patient is resting comfortably in no distress. He is afebrile and vital signs are stable.    Follow-up  Pertinent negatives include no abdominal pain, arthralgias, chest pain, chills, coughing, fever, headaches, joint swelling, myalgias, nausea, neck pain, rash, sore throat or weakness.     Review of Systems   Constitutional: Negative for appetite change, chills and fever.   HENT: Negative for ear pain, hearing loss, sinus pressure/congestion and sore throat.    Eyes: Negative for pain, redness and visual disturbance.   Respiratory: Negative for apnea, cough, shortness of breath and wheezing.    Cardiovascular: Positive for leg swelling. Negative for chest pain and palpitations.   Gastrointestinal: Negative for abdominal pain, blood in stool, constipation, diarrhea and nausea.   Endocrine: Negative for cold intolerance, heat intolerance and polyuria.   Genitourinary: Negative for dysuria and hematuria.   Musculoskeletal: Positive for back pain. Negative for arthralgias, joint swelling, myalgias and neck pain.   Integumentary:  Positive for mole/lesion. Negative for pallor and rash.   Allergic/Immunologic: Negative for frequent infections.   Neurological: Negative for tremors, seizures, weakness and headaches.   Hematological: Negative for adenopathy.   Psychiatric/Behavioral: Negative for confusion, dysphoric mood and sleep disturbance. The patient is not nervous/anxious.          Objective:      Physical Exam  Vitals and nursing note reviewed.   Constitutional:       General: He is not in acute distress.      Appearance: Normal appearance. He is not ill-appearing.   HENT:      Head: Normocephalic and atraumatic.      Right Ear: External ear normal.      Left Ear: External ear normal.      Nose: Nose normal.      Mouth/Throat:      Pharynx: Oropharynx is clear.   Eyes:      Extraocular Movements: Extraocular movements intact.      Conjunctiva/sclera: Conjunctivae normal.      Pupils: Pupils are equal, round, and reactive to light.   Neck:      Vascular: No carotid bruit.   Cardiovascular:      Rate and Rhythm: Normal rate and regular rhythm.      Pulses: Normal pulses.      Heart sounds: Normal heart sounds. No murmur heard.  Pulmonary:      Effort: No respiratory distress.      Breath sounds: Normal breath sounds. No wheezing or rales.   Abdominal:      General: Bowel sounds are normal. There is no distension.      Palpations: Abdomen is soft.   Musculoskeletal:         General: Normal range of motion.      Cervical back: Normal range of motion and neck supple.      Right lower leg: Edema present.      Left lower leg: Edema present.      Comments: Left > right   Skin:     General: Skin is warm and dry.      Capillary Refill: Capillary refill takes less than 2 seconds.      Coloration: Skin is not pale.      Findings: Lesion present.   Neurological:      General: No focal deficit present.      Mental Status: He is alert and oriented to person, place, and time.      Cranial Nerves: No cranial nerve deficit.   Psychiatric:         Mood and Affect: Mood normal.         Judgment: Judgment normal.         Assessment:       Problem List Items Addressed This Visit        Neuro    Chronic pain syndrome       Cardiac/Vascular    PVD (peripheral vascular disease) (Chronic)    Essential hypertension    Relevant Orders    CBC Auto Differential    Comprehensive Metabolic Panel    Lipid Panel    Venous insufficiency       GI    Gastroesophageal reflux disease      Other Visit Diagnoses     Follow up    -  Primary    Asteatotic eczema         Relevant Medications    triamcinolone acetonide 0.1% (KENALOG) 0.1 % ointment    Prurigo nodularis        Relevant Medications    triamcinolone acetonide 0.1% (KENALOG) 0.1 % ointment    Other Relevant Orders    Ambulatory referral/consult to Dermatology          Plan:       1. The patient presents today for follow up. We are going to check some routine lab work  :CBC, CMP, lipid panel. He is not fasting today so he will come back to have the labs drawn at a later date.    2. Essential hypertension-blood pressure is at goal.  Currently 140/70.  He is on lisinopril 10 mg daily although he is not sure if he is currently taking.  He also takes Lasix p.r.n.    3. GERD-continue with PPI daily    4. Erectile dysfunction-he uses Cialis p.r.n.    5. Chronic venous disease-he can not tolerate compression hose.  He uses Lasix p.r.n.  This is his biggest issue currently. He has seen Dr. Reed and Dr. Stern in the past. We are going to refer back to Dr. Stern. He states that he cannot wear lymphatic pumps because of time. He wants to know if there is something else that can be done. For now we will continue with lasix 40 mg and he will take twice a day for the next couple of days. Elevate leg when seated. He has a history of venous stent and was on antiplatelet therapy for about 1 year. This is not needed any longer.  7/13- He was seen by vein clinic in [place] and they believe that he would benefit from and extension of his prior vein stent. He is going to set that up once his wife has healed from recent surgery.    6. Eczema/ prurigo nodularis- He has kenalog ointment. He has seen dermatology  7/13- Still having issues. He occasionally gets some infection in these lesions as well. Currently not using steroid cream. Only using 1% clindamycin gel. I have given refills for both and referred to Chanell Santiago.     He will follow-up in 6 months or sooner if needed.

## 2022-07-18 ENCOUNTER — OFFICE VISIT (OUTPATIENT)
Dept: SPINE | Facility: CLINIC | Age: 57
End: 2022-07-18
Payer: COMMERCIAL

## 2022-07-18 DIAGNOSIS — M43.16 SPONDYLOLISTHESIS, LUMBAR REGION: ICD-10-CM

## 2022-07-18 DIAGNOSIS — M54.16 LUMBAR RADICULOPATHY, CHRONIC: Primary | ICD-10-CM

## 2022-07-18 DIAGNOSIS — M48.062 LUMBAR STENOSIS WITH NEUROGENIC CLAUDICATION: ICD-10-CM

## 2022-07-18 PROCEDURE — 99213 OFFICE O/P EST LOW 20 MIN: CPT | Mod: PBBFAC | Performed by: ORTHOPAEDIC SURGERY

## 2022-07-18 PROCEDURE — 1159F PR MEDICATION LIST DOCUMENTED IN MEDICAL RECORD: ICD-10-PCS | Mod: CPTII,,, | Performed by: ORTHOPAEDIC SURGERY

## 2022-07-18 PROCEDURE — 99214 OFFICE O/P EST MOD 30 MIN: CPT | Mod: S$PBB,,, | Performed by: ORTHOPAEDIC SURGERY

## 2022-07-18 PROCEDURE — 1159F MED LIST DOCD IN RCRD: CPT | Mod: CPTII,,, | Performed by: ORTHOPAEDIC SURGERY

## 2022-07-18 PROCEDURE — 99214 PR OFFICE/OUTPT VISIT, EST, LEVL IV, 30-39 MIN: ICD-10-PCS | Mod: S$PBB,,, | Performed by: ORTHOPAEDIC SURGERY

## 2022-07-19 NOTE — PROGRESS NOTES
MDM/time:  Greater than 30 minutes spent on this encounter including 10 minutes reviewing imaging and notes, 15 minutes with the patient, 5 minutes documentation    ASSESSMENT:  57 y.o. male with lumbar spondylolisthesis and radiculopathy now status post L4-5 anterior lumbar interbody fusion above his prior L5-S1 posterior laminectomy fusion 10/05/2021    PLAN:  Does not want revision surgery at this moment.  Follow-up in 3 months    HPI:  57 y.o. male here for repeat evaluation of lumbar spondylolisthesis and radiculopathy now status post L4-5 anterior lumbar interbody fusion above his prior L5-S1 posterior laminectomy fusion 10/05/2021 reports that his preoperative radicular pain has improved.  He has completed physical therapy without any lasting relief.  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    IMAGING:  X-rays lumbar spine reviewed show:  On the AP there is normal coronal alignment.  There are 5 non-rib-bearing lumbar vertebrae.  On the lateral there is maintained lumbar lordosis.  There has been prior L5-S1 laminectomy and fusion.  He is status post L4-5 anterior lumbar interbody fusion.  There is subsidence of the cage into the L5 superior endplate    MRI lumbar spine 07/18/2022 reviewed shows:  At L3-4 there is moderate bilateral lateral recess stenosis  At L4-5 there is prior ALIF.  Grade 1 anterolisthesis.  Severe central and bilateral lateral recess stenosis.  Severe bilateral foraminal stenosis  At L5-S1 prior posterior laminectomy and fusion with satisfactory decompression    Past Medical History:   Diagnosis Date    GERD (gastroesophageal reflux disease)     Hypertension      Past Surgical History:   Procedure Laterality Date    ANTERIOR LUMBAR INTERBODY FUSION (ALIF) N/A 10/5/2021    Procedure: L4-L5 anterior lumbar interbody fusion;  Surgeon: Jayden Mtz MD;  Location: [facility];  Service: Neurosurgery;  Laterality: N/A;    ILIAC  ARTERY STENT      JOINT REPLACEMENT Right     tka    LAPAROSCOPIC REPAIR OF HIATAL HERNIA      SINUS SURGERY       Social History     Tobacco Use    Smoking status: Never Smoker    Smokeless tobacco: Never Used   Substance Use Topics    Alcohol use: Never    Drug use: Never      Current Outpatient Medications   Medication Instructions    baclofen (LIORESAL) 10 MG tablet No dose, route, or frequency recorded.    clindamycin-benzoyl peroxide gel Topical (Top), 2 times daily    dextroamphetamine-amphetamine (ADDERALL) 20 mg tablet 1 tablet, Oral, 2 times daily    ergocalciferol, vitamin D2, (VITAMIN D ORAL) 300 mg, Oral    esomeprazole (NEXIUM) 40 MG capsule TAKE ONE (1) CAPSULE (40 MG TOTAL) BY MOUTH BEFORE BREAKFAST.    famotidine (PEPCID) 10 mg, Oral, 2 times daily    furosemide (LASIX) 40 mg, Oral, Daily    HYDROcodone-acetaminophen (NORCO)  mg per tablet 1 tablet, Oral    ketoconazole (NIZORAL) 2 % cream Topical (Top), Daily    lisinopriL 10 mg, Oral, Daily    oxyCODONE-acetaminophen (PERCOCET)  mg per tablet 1 tablet, Oral, Every 4 hours PRN    pregabalin (LYRICA) 150 MG capsule No dose, route, or frequency recorded.    promethazine (PHENERGAN) 25 mg, Oral, Every 4 hours    rifAMpin (RIFADIN) 300 mg, Oral, Every 12 hours    tadalafiL (CIALIS) 20 mg, Oral, Daily PRN    tiZANidine (ZANAFLEX) 4 MG tablet No dose, route, or frequency recorded.    triamcinolone acetonide 0.1% (KENALOG) 0.1 % ointment Topical (Top), 2 times daily        EXAM:  Constitutional  General Appearance:  There is no height or weight on file to calculate BMI., NAD  Psychiatric   Orientation: Oriented to time, oriented to place, oriented to person  Mood and Affect: Active and alert, normal mood, normal affect  Gait and Station   Appearance:  Normal gait, normal tandem gait, able to walk on toes, able to walk on heels  Healed incision  5/5 strength  Sensation intact  2+ pulses

## 2022-07-20 ENCOUNTER — HOSPITAL ENCOUNTER (OUTPATIENT)
Dept: RADIOLOGY | Facility: HOSPITAL | Age: 57
Discharge: HOME | End: 2022-07-20
Attending: ORTHOPAEDIC SURGERY
Payer: COMMERCIAL

## 2022-07-20 DIAGNOSIS — M25.561 RIGHT KNEE PAIN, UNSPECIFIED CHRONICITY: ICD-10-CM

## 2022-07-20 PROCEDURE — 73560 X-RAY EXAM OF KNEE 1 OR 2: CPT | Mod: 26,RT,, | Performed by: STUDENT IN AN ORGANIZED HEALTH CARE EDUCATION/TRAINING PROGRAM

## 2022-07-20 PROCEDURE — 73560 XR KNEE 1 OR 2 VIEW RIGHT: ICD-10-PCS | Mod: 26,RT,, | Performed by: STUDENT IN AN ORGANIZED HEALTH CARE EDUCATION/TRAINING PROGRAM

## 2022-07-20 PROCEDURE — 73560 X-RAY EXAM OF KNEE 1 OR 2: CPT | Mod: TC,RT

## 2022-07-21 ENCOUNTER — OFFICE VISIT (OUTPATIENT)
Dept: DERMATOLOGY | Facility: CLINIC | Age: 57
End: 2022-07-21
Payer: COMMERCIAL

## 2022-07-21 VITALS — BODY MASS INDEX: 32 KG/M2 | WEIGHT: 216.06 LBS | HEIGHT: 69 IN | RESPIRATION RATE: 18 BRPM

## 2022-07-21 DIAGNOSIS — L28.1 PRURIGO NODULARIS: Primary | ICD-10-CM

## 2022-07-21 DIAGNOSIS — L08.9 SKIN INFECTION: ICD-10-CM

## 2022-07-21 PROCEDURE — 87186 CULTURE, WOUND: ICD-10-PCS | Mod: ,,, | Performed by: CLINICAL MEDICAL LABORATORY

## 2022-07-21 PROCEDURE — 87077 CULTURE, WOUND: ICD-10-PCS | Mod: ,,, | Performed by: CLINICAL MEDICAL LABORATORY

## 2022-07-21 PROCEDURE — 1159F MED LIST DOCD IN RCRD: CPT | Mod: CPTII,,, | Performed by: DERMATOLOGY

## 2022-07-21 PROCEDURE — 99214 PR OFFICE/OUTPT VISIT, EST, LEVL IV, 30-39 MIN: ICD-10-PCS | Mod: ,,, | Performed by: DERMATOLOGY

## 2022-07-21 PROCEDURE — 87186 SC STD MICRODIL/AGAR DIL: CPT | Mod: ,,, | Performed by: CLINICAL MEDICAL LABORATORY

## 2022-07-21 PROCEDURE — 1159F PR MEDICATION LIST DOCUMENTED IN MEDICAL RECORD: ICD-10-PCS | Mod: CPTII,,, | Performed by: DERMATOLOGY

## 2022-07-21 PROCEDURE — 99214 OFFICE O/P EST MOD 30 MIN: CPT | Mod: ,,, | Performed by: DERMATOLOGY

## 2022-07-21 PROCEDURE — 87077 CULTURE AEROBIC IDENTIFY: CPT | Mod: ,,, | Performed by: CLINICAL MEDICAL LABORATORY

## 2022-07-21 PROCEDURE — 87070 CULTURE OTHR SPECIMN AEROBIC: CPT | Mod: ,,, | Performed by: CLINICAL MEDICAL LABORATORY

## 2022-07-21 PROCEDURE — 87070 CULTURE, WOUND: ICD-10-PCS | Mod: ,,, | Performed by: CLINICAL MEDICAL LABORATORY

## 2022-07-21 PROCEDURE — 3008F PR BODY MASS INDEX (BMI) DOCUMENTED: ICD-10-PCS | Mod: CPTII,,, | Performed by: DERMATOLOGY

## 2022-07-21 PROCEDURE — 3008F BODY MASS INDEX DOCD: CPT | Mod: CPTII,,, | Performed by: DERMATOLOGY

## 2022-07-21 RX ORDER — AMITRIPTYLINE HYDROCHLORIDE 25 MG/1
25 TABLET, FILM COATED ORAL NIGHTLY
Qty: 30 TABLET | Refills: 0 | Status: SHIPPED | OUTPATIENT
Start: 2022-07-21 | End: 2022-08-25 | Stop reason: SDUPTHER

## 2022-07-21 RX ORDER — CLINDAMYCIN HYDROCHLORIDE 300 MG/1
CAPSULE ORAL
COMMUNITY
Start: 2022-05-26 | End: 2022-07-21

## 2022-07-21 RX ORDER — NYSTATIN AND TRIAMCINOLONE ACETONIDE 100000; 1 [USP'U]/G; MG/G
OINTMENT TOPICAL
COMMUNITY
Start: 2022-01-26 | End: 2022-11-09

## 2022-07-21 NOTE — PROGRESS NOTES
[facility]   Chanell Santiago MD    Patient Name: Evin Mtz  Patient YOB: 1965   Date of Service: 7/21/22    CC: Prurigo Nodularis    HPI: Evin Mtz is a 57 y.o. male here today for prurigo nodularis, located on the bilateral arms and thighs.  Prurigo nodularis has been present for 5 years.  Previous treatments include oral antibiotics, clindamycin lotion, benzaclin. Biopsy has shown PN in the past.    Past Medical History:   Diagnosis Date    GERD (gastroesophageal reflux disease)     Hypertension      Past Surgical History:   Procedure Laterality Date    ANTERIOR LUMBAR INTERBODY FUSION (ALIF) N/A 10/5/2021    Procedure: L4-L5 anterior lumbar interbody fusion;  Surgeon: Jayden Mtz MD;  Location: [facility];  Service: Neurosurgery;  Laterality: N/A;    ILIAC ARTERY STENT      JOINT REPLACEMENT Right     tka    LAPAROSCOPIC REPAIR OF HIATAL HERNIA      SINUS SURGERY       Review of patient's allergies indicates:   Allergen Reactions    Aspirin Other (See Comments)     Gi symptoms-burning       Current Outpatient Medications:     amitriptyline (ELAVIL) 25 MG tablet, Take 1 tablet (25 mg total) by mouth every evening., Disp: 30 tablet, Rfl: 0    baclofen (LIORESAL) 10 MG tablet, , Disp: , Rfl:     clindamycin-benzoyl peroxide gel, Apply topically 2 (two) times daily., Disp: 45 g, Rfl: 2    dextroamphetamine-amphetamine (ADDERALL) 20 mg tablet, Take 1 tablet by mouth 2 (two) times a day. , Disp: , Rfl:     ergocalciferol, vitamin D2, (VITAMIN D ORAL), Take 300 mg by mouth., Disp: , Rfl:     esomeprazole (NEXIUM) 40 MG capsule, TAKE ONE (1) CAPSULE (40 MG TOTAL) BY MOUTH BEFORE BREAKFAST., Disp: 90 capsule, Rfl: 1    famotidine (PEPCID) 10 MG tablet, Take 10 mg by mouth 2 (two) times daily., Disp: , Rfl:     furosemide (LASIX) 40 MG tablet, Take 1 tablet (40 mg total) by mouth once daily., Disp: 90 tablet, Rfl: 3    HYDROcodone-acetaminophen (NORCO)  mg  per tablet, Take 1 tablet by mouth., Disp: , Rfl:     ketoconazole (NIZORAL) 2 % cream, Apply topically once daily. (Patient not taking: Reported on 7/13/2022), Disp: 30 g, Rfl: 5    lisinopriL 10 MG tablet, Take 1 tablet (10 mg total) by mouth once daily., Disp: 90 tablet, Rfl: 1    nystatin-triamcinolone (MYCOLOG) ointment, , Disp: , Rfl:     oxyCODONE-acetaminophen (PERCOCET)  mg per tablet, Take 1 tablet by mouth every 4 (four) hours as needed for Pain. (Patient not taking: Reported on 7/13/2022), Disp: 15 tablet, Rfl: 0    pregabalin (LYRICA) 150 MG capsule, , Disp: , Rfl:     promethazine (PHENERGAN) 25 MG tablet, Take 1 tablet (25 mg total) by mouth every 4 (four) hours. (Patient not taking: Reported on 7/13/2022), Disp: 45 tablet, Rfl: 0    rifAMpin (RIFADIN) 300 MG capsule, Take 1 capsule (300 mg total) by mouth every 12 (twelve) hours. (Patient not taking: Reported on 7/13/2022), Disp: 14 capsule, Rfl: 0    tadalafiL (CIALIS) 20 MG Tab, Take 1 tablet (20 mg total) by mouth daily as needed., Disp: 8 tablet, Rfl: 5    tiZANidine (ZANAFLEX) 4 MG tablet, , Disp: , Rfl:     triamcinolone acetonide 0.1% (KENALOG) 0.1 % ointment, Apply topically 2 (two) times daily., Disp: 80 g, Rfl: 5    ROS: A focused review of systems was obtained and negative.     Exam: A focused skin exam was performed. All areas examined were normal except as mentioned in the assessment and plan below.  General Appearance of the patient is well developed and well nourished.  Orientation: alert and oriented x 3.  Mood and affect: pleasant.    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 A diagnosis of Skin infection was also pertinent to this visit.    Plan:   Medications Ordered This Encounter   Medications    amitriptyline (ELAVIL) 25 MG tablet     Sig: Take 1 tablet (25 mg total) by mouth every evening.     Dispense:  30 tablet     Refill:  0       Prurigo Nodularis  - erythematous nodules with  central erosions located on the bilateral arms    Plan: Counseling  I counseled the patient regarding the following:  Skin care: Recommend trimming nails short, anti-itch lotions such as Sarna, topical steroids and antihistamines.  Expectations: Prurigo Nodularis is a self-inflicted lesion that results from picking or rubbing the same spot of skin over and over again. If the itch-scratch cycle is broken, the lesions will resolve.  Contact office if: Prurigo Nodularis worsens, or if itching is accompanied by constitutional symptoms.    -Pt instructed to use Vaseline to arms daily for itching and keep the sores covered  - will start amitriptyline but consider dupixent once approved for PN    Skin Infection, NOS   - crusting of PN lesions    Plan: Counseling  I counseled the patient regarding the following:  Skin care: Patients with purulence or fluid collections should have their wounds re-opened, drained, cultured and irrigated. All wound infections should be treated with antibiotics.  Expectations: Wound Infections usually occur 4-7 days postoperatively. Patients exhibit, pain, redness, swelling, cellulitic changes and fever.  Contact Office if: Wound Infection fails to respond to treatment or worsens, patient develops a fever, or if redness spreads despite antibiotics.    A bacterial culture was obtained from the bilateral arms to r/o secondary infection        Follow up in about 1 month (around 8/21/2022) for Prurigo Nodularis.    Chanell Santiago MD

## 2022-07-24 LAB — MICROORGANISM SPEC CULT: ABNORMAL

## 2022-07-25 ENCOUNTER — TELEPHONE (OUTPATIENT)
Dept: DERMATOLOGY | Facility: CLINIC | Age: 57
End: 2022-07-25
Payer: COMMERCIAL

## 2022-07-25 DIAGNOSIS — L01.00 IMPETIGO: ICD-10-CM

## 2022-07-25 RX ORDER — RIFAMPIN 300 MG/1
300 CAPSULE ORAL EVERY 12 HOURS
Qty: 14 CAPSULE | Refills: 0 | Status: SHIPPED | OUTPATIENT
Start: 2022-07-25 | End: 2023-05-30

## 2022-07-27 RX ORDER — CYCLOBENZAPRINE HCL 10 MG
10 TABLET ORAL 3 TIMES DAILY PRN
Qty: 45 TABLET | Refills: 0 | Status: SHIPPED | OUTPATIENT
Start: 2022-07-27 | End: 2022-08-06

## 2022-07-28 DIAGNOSIS — M54.50 CHRONIC LOW BACK PAIN, UNSPECIFIED BACK PAIN LATERALITY, UNSPECIFIED WHETHER SCIATICA PRESENT: Primary | ICD-10-CM

## 2022-07-28 DIAGNOSIS — G89.29 CHRONIC LOW BACK PAIN, UNSPECIFIED BACK PAIN LATERALITY, UNSPECIFIED WHETHER SCIATICA PRESENT: Primary | ICD-10-CM

## 2022-08-03 DIAGNOSIS — M43.16 SPONDYLOLISTHESIS, LUMBAR REGION: Primary | ICD-10-CM

## 2022-08-03 DIAGNOSIS — G89.29 CHRONIC LOW BACK PAIN, UNSPECIFIED BACK PAIN LATERALITY, UNSPECIFIED WHETHER SCIATICA PRESENT: ICD-10-CM

## 2022-08-03 DIAGNOSIS — M54.50 CHRONIC LOW BACK PAIN, UNSPECIFIED BACK PAIN LATERALITY, UNSPECIFIED WHETHER SCIATICA PRESENT: ICD-10-CM

## 2022-08-10 ENCOUNTER — TELEPHONE (OUTPATIENT)
Dept: INTERNAL MEDICINE | Facility: CLINIC | Age: 57
End: 2022-08-10
Payer: COMMERCIAL

## 2022-08-17 ENCOUNTER — HOSPITAL ENCOUNTER (EMERGENCY)
Facility: HOSPITAL | Age: 57
Discharge: HOME | End: 2022-08-18
Attending: EMERGENCY MEDICINE
Payer: COMMERCIAL

## 2022-08-17 DIAGNOSIS — R06.02 SHORTNESS OF BREATH: Primary | ICD-10-CM

## 2022-08-17 LAB
BASOPHILS # BLD AUTO: 0.03 K/UL (ref 0–0.2)
BASOPHILS NFR BLD AUTO: 0.6 % (ref 0–1)
DIFFERENTIAL METHOD BLD: ABNORMAL
EOSINOPHIL # BLD AUTO: 0.24 K/UL (ref 0–0.5)
EOSINOPHIL NFR BLD AUTO: 5.2 % (ref 1–4)
ERYTHROCYTE [DISTWIDTH] IN BLOOD BY AUTOMATED COUNT: 13.2 % (ref 11.5–14.5)
HCT VFR BLD AUTO: 35.2 % (ref 40–54)
HGB BLD-MCNC: 12.4 G/DL (ref 13.5–18)
IMM GRANULOCYTES # BLD AUTO: 0.01 K/UL (ref 0–0.04)
IMM GRANULOCYTES NFR BLD: 0.2 % (ref 0–0.4)
LYMPHOCYTES # BLD AUTO: 1.87 K/UL (ref 1–4.8)
LYMPHOCYTES NFR BLD AUTO: 40.4 % (ref 27–41)
MCH RBC QN AUTO: 32.5 PG (ref 27–31)
MCHC RBC AUTO-ENTMCNC: 35.2 G/DL (ref 32–36)
MCV RBC AUTO: 92.4 FL (ref 80–96)
MONOCYTES # BLD AUTO: 0.5 K/UL (ref 0–0.8)
MONOCYTES NFR BLD AUTO: 10.8 % (ref 2–6)
MPC BLD CALC-MCNC: 9.8 FL (ref 9.4–12.4)
NEUTROPHILS # BLD AUTO: 1.98 K/UL (ref 1.8–7.7)
NEUTROPHILS NFR BLD AUTO: 42.8 % (ref 53–65)
NRBC # BLD AUTO: 0 X10E3/UL
NRBC, AUTO (.00): 0 %
PLATELET # BLD AUTO: 230 K/UL (ref 150–400)
RBC # BLD AUTO: 3.81 M/UL (ref 4.6–6.2)
WBC # BLD AUTO: 4.63 K/UL (ref 4.5–11)

## 2022-08-17 PROCEDURE — 80053 COMPREHEN METABOLIC PANEL: CPT | Performed by: EMERGENCY MEDICINE

## 2022-08-17 PROCEDURE — 36415 COLL VENOUS BLD VENIPUNCTURE: CPT | Performed by: EMERGENCY MEDICINE

## 2022-08-17 PROCEDURE — 93010 ELECTROCARDIOGRAM REPORT: CPT | Mod: ,,, | Performed by: INTERNAL MEDICINE

## 2022-08-17 PROCEDURE — 83880 ASSAY OF NATRIURETIC PEPTIDE: CPT | Performed by: EMERGENCY MEDICINE

## 2022-08-17 PROCEDURE — 93005 ELECTROCARDIOGRAM TRACING: CPT

## 2022-08-17 PROCEDURE — 93010 EKG 12-LEAD: ICD-10-PCS | Mod: ,,, | Performed by: INTERNAL MEDICINE

## 2022-08-17 PROCEDURE — 99285 EMERGENCY DEPT VISIT HI MDM: CPT | Mod: 25

## 2022-08-17 PROCEDURE — 99284 EMERGENCY DEPT VISIT MOD MDM: CPT | Mod: ,,, | Performed by: EMERGENCY MEDICINE

## 2022-08-17 PROCEDURE — 99284 PR EMERGENCY DEPT VISIT,LEVEL IV: ICD-10-PCS | Mod: ,,, | Performed by: EMERGENCY MEDICINE

## 2022-08-17 PROCEDURE — 84484 ASSAY OF TROPONIN QUANT: CPT | Performed by: EMERGENCY MEDICINE

## 2022-08-17 PROCEDURE — 85025 COMPLETE CBC W/AUTO DIFF WBC: CPT | Performed by: EMERGENCY MEDICINE

## 2022-08-18 ENCOUNTER — TELEPHONE (OUTPATIENT)
Dept: EMERGENCY MEDICINE | Facility: HOSPITAL | Age: 57
End: 2022-08-18
Payer: COMMERCIAL

## 2022-08-18 VITALS
SYSTOLIC BLOOD PRESSURE: 125 MMHG | DIASTOLIC BLOOD PRESSURE: 77 MMHG | BODY MASS INDEX: 31.99 KG/M2 | HEART RATE: 82 BPM | WEIGHT: 216 LBS | OXYGEN SATURATION: 97 % | HEIGHT: 69 IN | TEMPERATURE: 98 F | RESPIRATION RATE: 12 BRPM

## 2022-08-18 LAB
ALBUMIN SERPL BCP-MCNC: 3.5 G/DL (ref 3.5–5)
ALBUMIN/GLOB SERPL: 1 {RATIO}
ALP SERPL-CCNC: 85 U/L (ref 45–115)
ALT SERPL W P-5'-P-CCNC: 33 U/L (ref 16–61)
ANION GAP SERPL CALCULATED.3IONS-SCNC: 9 MMOL/L (ref 7–16)
AST SERPL W P-5'-P-CCNC: 23 U/L (ref 15–37)
BILIRUB SERPL-MCNC: 0.5 MG/DL (ref 0–1.2)
BUN SERPL-MCNC: 13 MG/DL (ref 7–18)
BUN/CREAT SERPL: 14 (ref 6–20)
CALCIUM SERPL-MCNC: 8.9 MG/DL (ref 8.5–10.1)
CHLORIDE SERPL-SCNC: 108 MMOL/L (ref 98–107)
CO2 SERPL-SCNC: 29 MMOL/L (ref 21–32)
CREAT SERPL-MCNC: 0.91 MG/DL (ref 0.7–1.3)
D DIMER PPP FEU-MCNC: 0.79 ΜG/ML (ref 0–0.47)
EGFR (NO RACE VARIABLE) (RUSH/TITUS): 97 ML/MIN/1.73M²
GLOBULIN SER-MCNC: 3.4 G/DL (ref 2–4)
GLUCOSE SERPL-MCNC: 134 MG/DL (ref 74–106)
NT-PROBNP SERPL-MCNC: 13 PG/ML (ref 1–125)
POTASSIUM SERPL-SCNC: 3.8 MMOL/L (ref 3.5–5.1)
PROT SERPL-MCNC: 6.9 G/DL (ref 6.4–8.2)
SODIUM SERPL-SCNC: 142 MMOL/L (ref 136–145)
TROPONIN I SERPL HS-MCNC: 4.4 PG/ML

## 2022-08-18 PROCEDURE — 25500020 PHARM REV CODE 255: Performed by: EMERGENCY MEDICINE

## 2022-08-18 PROCEDURE — 36415 COLL VENOUS BLD VENIPUNCTURE: CPT | Performed by: EMERGENCY MEDICINE

## 2022-08-18 PROCEDURE — 85378 FIBRIN DEGRADE SEMIQUANT: CPT | Performed by: EMERGENCY MEDICINE

## 2022-08-18 RX ADMIN — IOPAMIDOL 100 ML: 755 INJECTION, SOLUTION INTRAVENOUS at 02:08

## 2022-08-18 NOTE — ED PROVIDER NOTES
Encounter Date: 8/17/2022       History     Chief Complaint   Patient presents with    Shortness of Breath    Cough     A 57-year-old male patient came to the emergency department complaining of shortness of breath.  The symptoms started yesterday and gradually worsened over the past 24 hours.  There is no fever or chills.  There is no nausea or vomiting.  The patient states that he fell on his chest 1 month ago that is why he has some chest discomfort.    The history is provided by the patient. No  was used.     Review of patient's allergies indicates:   Allergen Reactions    Aspirin Other (See Comments)     Gi symptoms-burning     Past Medical History:   Diagnosis Date    GERD (gastroesophageal reflux disease)     Hypertension      Past Surgical History:   Procedure Laterality Date    ANTERIOR LUMBAR INTERBODY FUSION (ALIF) N/A 10/5/2021    Procedure: L4-L5 anterior lumbar interbody fusion;  Surgeon: Jayden Mtz MD;  Location: [facility];  Service: Neurosurgery;  Laterality: N/A;    ILIAC ARTERY STENT      JOINT REPLACEMENT Right     tka    LAPAROSCOPIC REPAIR OF HIATAL HERNIA      SINUS SURGERY       Family History   Problem Relation Age of Onset    No Known Problems Mother     Hypertension Father     Diabetes Sister     No Known Problems Brother     No Known Problems Maternal Aunt     No Known Problems Maternal Uncle     No Known Problems Paternal Aunt     No Known Problems Paternal Uncle     No Known Problems Maternal Grandmother     No Known Problems Maternal Grandfather     No Known Problems Paternal Grandmother     No Known Problems Paternal Grandfather      Social History     Tobacco Use    Smoking status: Never Smoker    Smokeless tobacco: Never Used   Substance Use Topics    Alcohol use: Never    Drug use: Never     Review of Systems   Constitutional: Negative for fever.   HENT: Negative for sore throat.    Respiratory: Negative for shortness of breath.     Cardiovascular: Negative for chest pain.   Gastrointestinal: Negative for nausea.   Genitourinary: Negative for dysuria.   Musculoskeletal: Negative for back pain.   Skin: Negative for rash.   Neurological: Negative for weakness.   Hematological: Does not bruise/bleed easily.   All other systems reviewed and are negative.      Physical Exam     Initial Vitals [08/17/22 2308]   BP Pulse Resp Temp SpO2   (!) 165/80 84 18 97.6 °F (36.4 °C) 99 %      MAP       --         Physical Exam    Nursing note and vitals reviewed.  Constitutional: He appears well-developed and well-nourished.   HENT:   Head: Normocephalic and atraumatic.   Eyes: EOM are normal. Pupils are equal, round, and reactive to light.   Neck: Neck supple. No thyromegaly present.   Normal range of motion.  Cardiovascular: Normal rate, regular rhythm, normal heart sounds and intact distal pulses.   No murmur heard.  Pulmonary/Chest: Breath sounds normal. No respiratory distress. He has no wheezes.   Abdominal: Abdomen is soft. Bowel sounds are normal. There is no abdominal tenderness.   Musculoskeletal:         General: No tenderness or edema. Normal range of motion.      Cervical back: Normal range of motion and neck supple.     Lymphadenopathy:     He has no cervical adenopathy.   Neurological: He is alert and oriented to person, place, and time. He has normal strength and normal reflexes. No cranial nerve deficit or sensory deficit. GCS score is 15. GCS eye subscore is 4. GCS verbal subscore is 5. GCS motor subscore is 6.   Skin: Skin is warm and dry. Capillary refill takes less than 2 seconds. No rash noted.   Psychiatric: He has a normal mood and affect.         Medical Screening Exam   See Full Note    ED Course   Procedures  Labs Reviewed   COMPREHENSIVE METABOLIC PANEL - Abnormal; Notable for the following components:       Result Value    Chloride 108 (*)     Glucose 134 (*)     All other components within normal limits   CBC WITH DIFFERENTIAL -  Abnormal; Notable for the following components:    RBC 3.81 (*)     Hemoglobin 12.4 (*)     Hematocrit 35.2 (*)     MCH 32.5 (*)     Neutrophils % 42.8 (*)     Monocytes % 10.8 (*)     Eosinophils % 5.2 (*)     All other components within normal limits   D DIMER, QUANTITATIVE - Abnormal; Notable for the following components:    D-Dimer 0.79 (*)     All other components within normal limits   TROPONIN I - Normal   NT-PRO NATRIURETIC PEPTIDE - Normal   CBC W/ AUTO DIFFERENTIAL    Narrative:     The following orders were created for panel order CBC Auto Differential.  Procedure                               Abnormality         Status                     ---------                               -----------         ------                     CBC with Differential[[id number]]        Abnormal            Final result                 Please view results for these tests on the individual orders.   EXTRA TUBES    Narrative:     The following orders were created for panel order EXTRA TUBES.  Procedure                               Abnormality         Status                     ---------                               -----------         ------                     Light Blue Top Hold[[id number]]                              In process                   Please view results for these tests on the individual orders.   LIGHT BLUE TOP HOLD        ECG Results          EKG 12-lead (Final result)  Result time 08/18/22 06:12:49    Final result by Interface, Lab [facility] (08/18/22 06:12:49)                 Narrative:    Test Reason : R06.02,    Vent. Rate : 079 BPM     Atrial Rate : 000 BPM     P-R Int : 148 ms          QRS Dur : 100 ms      QT Int : 384 ms       P-R-T Axes : 052 024 031 degrees     QTc Int : 417 ms    Sinus rhythm  Normal ECG    Confirmed by Sunita CALDERON, Roshan MARTINO ([id number]) on 8/18/2022 6:12:34 AM    Referred By: [email or web address]   SELF           Confirmed By:Roshan Dorsey MD                            Imaging Results           CTA Chest Non-Coronary - PE Study (Final result)  Result time 08/18/22 07:40:22    Final result by Ramiro Livingston MD (08/18/22 07:40:22)                 Impression:      No pulmonary embolus or acute abnormality in the chest.    Small hiatal hernia.  Fatty infiltration of the liver.      Electronically signed by: Ramiro Livingston  Date:    08/18/2022  Time:    07:40             Narrative:    EXAMINATION:  CTA CHEST NON CORONARY    CLINICAL HISTORY:  Pulmonary embolism (PE) suspected, positive D-dimer;    TECHNIQUE:  Low dose axial images, sagittal and coronal reformations were obtained from the thoracic inlet to the lung bases following the IV administration of 100 mL of Isovue 370.  Contrast timing was optimized to evaluate the pulmonary arteries.  MIP images were performed.    COMPARISON:  Chest radiograph 08/17/2022    FINDINGS:  No pulmonary embolus identified.    Heart normal size.  Thoracic aorta normal caliber.  No adenopathy in the chest.    There is a small hiatal hernia.    No lungs are clear.  No pneumothorax.  No pleural effusion.    Fatty infiltration of the liver.  Surgical clips in the upper abdomen.    No acute fractures seen.                               X-Ray Chest 1 View (Final result)  Result time 08/18/22 07:51:41    Final result by Skyler Sandoval II, MD (08/18/22 07:51:41)                 Impression:      No evidence of acute cardiopulmonary disease.      Electronically signed by: Skyler Sandoval  Date:    08/18/2022  Time:    07:51             Narrative:    EXAMINATION:  XR CHEST 1 VIEW    CLINICAL HISTORY:  shortness of breath;    COMPARISON:  17 March 2020    FINDINGS:  The heart and mediastinum are normal in size and configuration.  The pulmonary vascularity is normal in caliber.  No lung infiltrates, effusions, pneumothorax or other abnormality is demonstrated.                                 Medications   iopamidoL (ISOVUE-370) injection 100 mL (100 mLs Intravenous Given 8/18/22 0228)                        Clinical Impression:   Final diagnoses:  [R06.02] Shortness of breath (Primary)          ED Disposition Condition    Discharge Stable        ED Prescriptions     None        Follow-up Information     Follow up With Specialties Details Why Contact Info    Torito Santos, DO Critical Care Medicine In 3 days If symptoms worsen [address]  [place] [address]  [phone]             Mark Holt MD  08/20/22 0007

## 2022-08-22 ENCOUNTER — TELEPHONE (OUTPATIENT)
Dept: INTERNAL MEDICINE | Facility: CLINIC | Age: 57
End: 2022-08-22
Payer: COMMERCIAL

## 2022-08-22 NOTE — TELEPHONE ENCOUNTER
----- Message from May Pearson sent at 8/22/2022  9:08 AM CDT -----  Regarding: Marla Gutiérrez said to call her at ext [phone]

## 2022-08-23 NOTE — TELEPHONE ENCOUNTER
I called wife back to inform her of medication being ordered and to see if they still wanted an appointment. She did not answer and voicemail was left with call back number

## 2022-08-23 NOTE — TELEPHONE ENCOUNTER
"pts wife is requesting pt to be seen. She states while at the ER, they told her he may possibly have pneumonia due to "haze" on his chest x ray. Dr. Santos has reviewed chest xray and CT. She is also requesting medication for a sinus infection for pt and a refill of flexeril. After speaking with Dr. Santos, medication ordered.   "

## 2022-08-25 ENCOUNTER — OFFICE VISIT (OUTPATIENT)
Dept: DERMATOLOGY | Facility: CLINIC | Age: 57
End: 2022-08-25
Payer: COMMERCIAL

## 2022-08-25 VITALS — HEIGHT: 69 IN | WEIGHT: 211.44 LBS | BODY MASS INDEX: 31.32 KG/M2 | RESPIRATION RATE: 16 BRPM

## 2022-08-25 DIAGNOSIS — L28.1 PRURIGO NODULARIS: Primary | ICD-10-CM

## 2022-08-25 DIAGNOSIS — L28.1 PRURIGO NODULARIS: ICD-10-CM

## 2022-08-25 DIAGNOSIS — L08.9 SKIN INFECTION: ICD-10-CM

## 2022-08-25 PROCEDURE — 3008F BODY MASS INDEX DOCD: CPT | Mod: ,,, | Performed by: DERMATOLOGY

## 2022-08-25 PROCEDURE — 3008F PR BODY MASS INDEX (BMI) DOCUMENTED: ICD-10-PCS | Mod: ,,, | Performed by: DERMATOLOGY

## 2022-08-25 PROCEDURE — 1159F MED LIST DOCD IN RCRD: CPT | Mod: ,,, | Performed by: DERMATOLOGY

## 2022-08-25 PROCEDURE — 99214 OFFICE O/P EST MOD 30 MIN: CPT | Mod: ,,, | Performed by: DERMATOLOGY

## 2022-08-25 PROCEDURE — 1160F PR REVIEW ALL MEDS BY PRESCRIBER/CLIN PHARMACIST DOCUMENTED: ICD-10-PCS | Mod: ,,, | Performed by: DERMATOLOGY

## 2022-08-25 PROCEDURE — 1160F RVW MEDS BY RX/DR IN RCRD: CPT | Mod: ,,, | Performed by: DERMATOLOGY

## 2022-08-25 PROCEDURE — 99214 PR OFFICE/OUTPT VISIT, EST, LEVL IV, 30-39 MIN: ICD-10-PCS | Mod: ,,, | Performed by: DERMATOLOGY

## 2022-08-25 PROCEDURE — 1159F PR MEDICATION LIST DOCUMENTED IN MEDICAL RECORD: ICD-10-PCS | Mod: ,,, | Performed by: DERMATOLOGY

## 2022-08-25 RX ORDER — AMITRIPTYLINE HYDROCHLORIDE 25 MG/1
25 TABLET, FILM COATED ORAL NIGHTLY
Qty: 30 TABLET | Refills: 5 | Status: SHIPPED | OUTPATIENT
Start: 2022-08-25 | End: 2023-02-20

## 2022-08-25 RX ORDER — AMOXICILLIN 875 MG/1
875 TABLET, FILM COATED ORAL EVERY 12 HOURS
Qty: 14 TABLET | Refills: 0 | Status: SHIPPED | OUTPATIENT
Start: 2022-08-25 | End: 2022-11-09

## 2022-08-25 RX ORDER — CYCLOBENZAPRINE HCL 10 MG
10 TABLET ORAL 3 TIMES DAILY PRN
Qty: 30 TABLET | Refills: 2 | Status: SHIPPED | OUTPATIENT
Start: 2022-08-25 | End: 2022-09-04

## 2022-08-25 NOTE — PROGRESS NOTES
[facility]   Chanell Santiago MD    Patient Name: Evin Mtz  Patient YOB: 1965   Date of Service: 8/25/22    CC: Follow-up Prurigo Nodularis    HPI: Evin Mtz is a 57 y.o. male here today for follow-up of PN, last seen 07/22.  Previous treatments include amitryptyline and rifampin.  Overall, the PN is improved.  Treatment plan was followed as directed.    Past Medical History:   Diagnosis Date    GERD (gastroesophageal reflux disease)     Hypertension      Past Surgical History:   Procedure Laterality Date    ANTERIOR LUMBAR INTERBODY FUSION (ALIF) N/A 10/5/2021    Procedure: L4-L5 anterior lumbar interbody fusion;  Surgeon: Jayden Mtz MD;  Location: [facility];  Service: Neurosurgery;  Laterality: N/A;    ILIAC ARTERY STENT      JOINT REPLACEMENT Right     tka    LAPAROSCOPIC REPAIR OF HIATAL HERNIA      SINUS SURGERY       Review of patient's allergies indicates:   Allergen Reactions    Aspirin Other (See Comments)     Gi symptoms-burning       Current Outpatient Medications:     amitriptyline (ELAVIL) 25 MG tablet, Take 1 tablet (25 mg total) by mouth every evening., Disp: 30 tablet, Rfl: 5    amoxicillin (AMOXIL) 875 MG tablet, Take 1 tablet (875 mg total) by mouth every 12 (twelve) hours., Disp: 14 tablet, Rfl: 0    baclofen (LIORESAL) 10 MG tablet, , Disp: , Rfl:     clindamycin-benzoyl peroxide gel, Apply topically 2 (two) times daily., Disp: 45 g, Rfl: 2    cyclobenzaprine (FLEXERIL) 10 MG tablet, Take 1 tablet (10 mg total) by mouth 3 (three) times daily as needed for Muscle spasms., Disp: 30 tablet, Rfl: 2    dextroamphetamine-amphetamine (ADDERALL) 20 mg tablet, Take 1 tablet by mouth 2 (two) times a day. , Disp: , Rfl:     ergocalciferol, vitamin D2, (VITAMIN D ORAL), Take 300 mg by mouth., Disp: , Rfl:     esomeprazole (NEXIUM) 40 MG capsule, TAKE ONE (1) CAPSULE  BY MOUTH BEFORE BREAKFAST., Disp: 90 capsule, Rfl: 1    famotidine (PEPCID) 10  MG tablet, Take 10 mg by mouth 2 (two) times daily., Disp: , Rfl:     furosemide (LASIX) 40 MG tablet, Take 1 tablet (40 mg total) by mouth once daily., Disp: 90 tablet, Rfl: 3    HYDROcodone-acetaminophen (NORCO)  mg per tablet, Take 1 tablet by mouth., Disp: , Rfl:     ketoconazole (NIZORAL) 2 % cream, Apply topically once daily. (Patient not taking: Reported on 7/13/2022), Disp: 30 g, Rfl: 5    lisinopriL 10 MG tablet, Take 1 tablet (10 mg total) by mouth once daily., Disp: 90 tablet, Rfl: 1    nystatin-triamcinolone (MYCOLOG) ointment, , Disp: , Rfl:     oxyCODONE-acetaminophen (PERCOCET)  mg per tablet, Take 1 tablet by mouth every 4 (four) hours as needed for Pain. (Patient not taking: Reported on 7/13/2022), Disp: 15 tablet, Rfl: 0    pregabalin (LYRICA) 150 MG capsule, , Disp: , Rfl:     promethazine (PHENERGAN) 25 MG tablet, Take 1 tablet (25 mg total) by mouth every 4 (four) hours. (Patient not taking: Reported on 7/13/2022), Disp: 45 tablet, Rfl: 0    rifAMpin (RIFADIN) 300 MG capsule, Take 1 capsule (300 mg total) by mouth every 12 (twelve) hours., Disp: 14 capsule, Rfl: 0    tadalafiL (CIALIS) 20 MG Tab, Take 1 tablet (20 mg total) by mouth daily as needed., Disp: 8 tablet, Rfl: 5    tiZANidine (ZANAFLEX) 4 MG tablet, , Disp: , Rfl:     triamcinolone acetonide 0.1% (KENALOG) 0.1 % ointment, Apply topically 2 (two) times daily., Disp: 80 g, Rfl: 5    ROS: A focused review of systems was obtained and negative.     Exam: A focused skin exam was performed. All areas examined were normal except as mentioned in the assessment and plan below.  General Appearance of the patient is well developed and well nourished.  Orientation: alert and oriented x 3.  Mood and affect: pleasant.    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 Diagnoses of Skin infection and Prurigo nodularis were also pertinent to this visit.    Plan:   Medications Ordered This Encounter    Medications    amitriptyline (ELAVIL) 25 MG tablet     Sig: Take 1 tablet (25 mg total) by mouth every evening.     Dispense:  30 tablet     Refill:  5     Prurigo Nodularis  - erythematous nodules with central erosions located on the bilateral arms    Plan: Counseling  I counseled the patient regarding the following:  Skin care: Recommend trimming nails short, anti-itch lotions such as Sarna, topical steroids and antihistamines.  Expectations: Prurigo Nodularis is a self-inflicted lesion that results from picking or rubbing the same spot of skin over and over again. If the itch-scratch cycle is broken, the lesions will resolve.  Contact office if: Prurigo Nodularis worsens, or if itching is accompanied by constitutional symptoms.    - continue amitriptyline but will consider dupixent once approved for PN if needed in the future     Skin Infection, NOS   - clear     Plan: Counseling  I counseled the patient regarding the following:  Skin care: Patients with purulence or fluid collections should have their wounds re-opened, drained, cultured and irrigated. All wound infections should be treated with antibiotics.  Expectations: Wound Infections usually occur 4-7 days postoperatively. Patients exhibit, pain, rednes, swelling, cellulitic changes and fever.  Contact Office if: Wound Infection fails to respond to treatment or worsens, patient develops a fever, or if redness spreads despite antibiotics.        Follow up in about 6 months (around 2/25/2023).    Chanell Santiago MD

## 2022-10-12 ENCOUNTER — HOSPITAL ENCOUNTER (OUTPATIENT)
Dept: RADIOLOGY | Facility: HOSPITAL | Age: 57
Discharge: HOME | End: 2022-10-12
Attending: NEUROLOGICAL SURGERY
Payer: COMMERCIAL

## 2022-10-12 DIAGNOSIS — N32.0 STENOSIS (ACQUIRED) OF BLADDER NECK OR VESICOURETHRAL ORIFICE: ICD-10-CM

## 2022-10-12 PROCEDURE — 72131 CT LUMBAR SPINE W/O DYE: CPT | Mod: 26,,, | Performed by: RADIOLOGY

## 2022-10-12 PROCEDURE — 72131 CT LUMBAR SPINE W/O DYE: CPT | Mod: TC

## 2022-10-12 PROCEDURE — 72131 CT LUMBAR SPINE WITHOUT CONTRAST: ICD-10-PCS | Mod: 26,,, | Performed by: RADIOLOGY

## 2022-10-19 ENCOUNTER — TELEPHONE (OUTPATIENT)
Dept: INTERNAL MEDICINE | Facility: CLINIC | Age: 57
End: 2022-10-19
Payer: COMMERCIAL

## 2022-10-19 NOTE — TELEPHONE ENCOUNTER
Called pt stated ian is too strong for him and he would like flexeril instead. I informed him I would have to get approval from .

## 2022-10-19 NOTE — TELEPHONE ENCOUNTER
----- Message from Criselda Vazquez sent at 10/19/2022  1:56 PM CDT -----  Regarding: RX refills  Needs refills called in to [facility] [phone] phone  [phone] fax  1. Flexril 10 mg ( do not see this on medication list please verify) 2. Clindamycin gel 1.5 mg requested biggest tube they have.

## 2022-10-20 RX ORDER — CYCLOBENZAPRINE HCL 10 MG
10 TABLET ORAL 3 TIMES DAILY PRN
Qty: 30 TABLET | Refills: 1 | Status: SHIPPED | OUTPATIENT
Start: 2022-10-20 | End: 2022-11-28

## 2022-10-20 RX ORDER — CLINDAMYCIN PHOSPHATE AND BENZOYL PEROXIDE 10; 50 MG/G; MG/G
GEL TOPICAL 2 TIMES DAILY
Qty: 45 G | Refills: 2 | Status: SHIPPED | OUTPATIENT
Start: 2022-10-20 | End: 2023-10-20

## 2022-12-06 RX ORDER — AMOXICILLIN 875 MG/1
875 TABLET, FILM COATED ORAL EVERY 12 HOURS
Qty: 14 TABLET | Refills: 0 | Status: SHIPPED | OUTPATIENT
Start: 2022-12-06 | End: 2023-05-30

## 2022-12-06 NOTE — TELEPHONE ENCOUNTER
----- Message from Maria Teresa Muller sent at 12/6/2022  9:16 AM CST -----  Marla dorado will need you to give her a call at ext [phone]

## 2022-12-06 NOTE — TELEPHONE ENCOUNTER
Pts spouse called with pt c/o sinus congestion and ear ache. Requesting med to be sent into [facility]

## 2022-12-23 ENCOUNTER — HOSPITAL ENCOUNTER (OUTPATIENT)
Dept: RADIOLOGY | Facility: HOSPITAL | Age: 57
Discharge: HOME | End: 2022-12-23
Attending: NEUROLOGICAL SURGERY
Payer: COMMERCIAL

## 2022-12-23 DIAGNOSIS — Z98.1 S/P LUMBAR FUSION: ICD-10-CM

## 2022-12-23 PROCEDURE — 72110 X-RAY EXAM L-2 SPINE 4/>VWS: CPT | Mod: TC

## 2022-12-23 PROCEDURE — 72110 XR LUMBAR SPINE AP AND LAT WITH FLEX/EXT: ICD-10-PCS | Mod: 26,,, | Performed by: RADIOLOGY

## 2022-12-23 PROCEDURE — 72110 X-RAY EXAM L-2 SPINE 4/>VWS: CPT | Mod: 26,,, | Performed by: RADIOLOGY

## 2023-01-19 ENCOUNTER — OFFICE VISIT (OUTPATIENT)
Dept: OTOLARYNGOLOGY | Facility: CLINIC | Age: 58
End: 2023-01-19
Payer: COMMERCIAL

## 2023-01-19 VITALS — HEIGHT: 69 IN | WEIGHT: 212 LBS | BODY MASS INDEX: 31.4 KG/M2

## 2023-01-19 DIAGNOSIS — H61.23 BILATERAL IMPACTED CERUMEN: ICD-10-CM

## 2023-01-19 DIAGNOSIS — H65.23 BILATERAL CHRONIC SEROUS OTITIS MEDIA: Primary | ICD-10-CM

## 2023-01-19 DIAGNOSIS — H60.313 DIFFUSE OTITIS EXTERNA OF BOTH EARS, UNSPECIFIED CHRONICITY: ICD-10-CM

## 2023-01-19 PROCEDURE — 3008F PR BODY MASS INDEX (BMI) DOCUMENTED: ICD-10-PCS | Mod: ,,, | Performed by: OTOLARYNGOLOGY

## 2023-01-19 PROCEDURE — 99204 OFFICE O/P NEW MOD 45 MIN: CPT | Mod: 25,S$PBB,, | Performed by: OTOLARYNGOLOGY

## 2023-01-19 PROCEDURE — 69210 REMOVE IMPACTED EAR WAX UNI: CPT | Mod: S$PBB,,, | Performed by: OTOLARYNGOLOGY

## 2023-01-19 PROCEDURE — 99204 PR OFFICE/OUTPT VISIT, NEW, LEVL IV, 45-59 MIN: ICD-10-PCS | Mod: 25,S$PBB,, | Performed by: OTOLARYNGOLOGY

## 2023-01-19 PROCEDURE — 3008F BODY MASS INDEX DOCD: CPT | Mod: ,,, | Performed by: OTOLARYNGOLOGY

## 2023-01-19 PROCEDURE — 1159F PR MEDICATION LIST DOCUMENTED IN MEDICAL RECORD: ICD-10-PCS | Mod: ,,, | Performed by: OTOLARYNGOLOGY

## 2023-01-19 PROCEDURE — 1160F RVW MEDS BY RX/DR IN RCRD: CPT | Mod: ,,, | Performed by: OTOLARYNGOLOGY

## 2023-01-19 PROCEDURE — 1160F PR REVIEW ALL MEDS BY PRESCRIBER/CLIN PHARMACIST DOCUMENTED: ICD-10-PCS | Mod: ,,, | Performed by: OTOLARYNGOLOGY

## 2023-01-19 PROCEDURE — 69210 PR REMOVAL IMPACTED CERUMEN REQUIRING INSTRUMENTATION, UNILATERAL: ICD-10-PCS | Mod: S$PBB,,, | Performed by: OTOLARYNGOLOGY

## 2023-01-19 PROCEDURE — 1159F MED LIST DOCD IN RCRD: CPT | Mod: ,,, | Performed by: OTOLARYNGOLOGY

## 2023-01-19 PROCEDURE — 69210 REMOVE IMPACTED EAR WAX UNI: CPT | Mod: 50,PBBFAC | Performed by: OTOLARYNGOLOGY

## 2023-01-19 PROCEDURE — 99213 OFFICE O/P EST LOW 20 MIN: CPT | Mod: 25,PBBFAC | Performed by: OTOLARYNGOLOGY

## 2023-01-19 RX ORDER — NEOMYCIN SULFATE, POLYMYXIN B SULFATE AND HYDROCORTISONE 10; 3.5; 1 MG/ML; MG/ML; [USP'U]/ML
3 SUSPENSION/ DROPS AURICULAR (OTIC) 2 TIMES DAILY
Qty: 10 ML | Refills: 0 | Status: SHIPPED | OUTPATIENT
Start: 2023-01-19 | End: 2023-05-30

## 2023-01-19 NOTE — PROGRESS NOTES
Subjective:       Patient ID: Evin Mtz is a 57 y.o. male.    Chief Complaint: Otitis Media (Patient presents for right ear pain. )  Hurting for 6 months  Otitis Media:    Associated symptoms: Ear pain.    Review of Systems   HENT:  Positive for ear discharge, ear pain and hearing loss.    All other systems reviewed and are negative.    Objective:      Physical Exam  General: NAD  Head: Normocephalic, atraumatic, no facial asymmetry/normal strength,  Ears: Both auricules normal in appearance, w/o deformities tympanic membranes irritated external auditory canals red swollen   Nose: External nose w/o deformities normal turbinates no drainage or inflammation  Oral Cavity: Lips, gums, floor of mouth, tongue hard palate, and buccal mucosa without mass/lesion  Oropharynx: Mucosa pink and moist, soft palate, posterior pharynx and oropharyngeal wall without mass/lesion  Neck: Supple, symmetric, trachea midline, no palpable mass/lesion, no palpable cervical lymphadenopathy  Skin: Warm and dry, no concerning lesions  Respiratory: Respirations even, unlabored     Procedure: Binocular microscopy with removal of cerumen impaction using microsurgical instrumentation.  After explaining the procedure and obtaining verbal assent,  each external auditory canal visualized with the 250 mm focal length lens through the operating microscope. The obstructing cerumen was removed with microsurgical instrumentation to reveal irritated  external auditory canals. The patient tolerated this procedure well without complication.         Assessment:       1. Bilateral chronic serous otitis media    2. Bilateral impacted cerumen    3. Diffuse otitis externa of both ears, unspecified chronicity          Plan:       CSP   Drops   F/u 3 weeks or prn

## 2023-02-21 ENCOUNTER — TELEPHONE (OUTPATIENT)
Dept: DERMATOLOGY | Facility: CLINIC | Age: 58
End: 2023-02-21
Payer: COMMERCIAL

## 2023-02-21 RX ORDER — DESONIDE 0.5 MG/G
CREAM TOPICAL
Qty: 60 G | Refills: 3 | Status: SHIPPED | OUTPATIENT
Start: 2023-02-21 | End: 2023-11-28

## 2023-02-27 ENCOUNTER — OFFICE VISIT (OUTPATIENT)
Dept: DERMATOLOGY | Facility: CLINIC | Age: 58
End: 2023-02-27
Payer: COMMERCIAL

## 2023-02-27 DIAGNOSIS — L21.9 SEBORRHEIC DERMATITIS: Primary | ICD-10-CM

## 2023-02-27 DIAGNOSIS — L28.1 PRURIGO NODULARIS: ICD-10-CM

## 2023-02-27 PROCEDURE — 1159F MED LIST DOCD IN RCRD: CPT | Mod: ,,, | Performed by: DERMATOLOGY

## 2023-02-27 PROCEDURE — 99214 PR OFFICE/OUTPT VISIT, EST, LEVL IV, 30-39 MIN: ICD-10-PCS | Mod: ,,, | Performed by: DERMATOLOGY

## 2023-02-27 PROCEDURE — 1159F PR MEDICATION LIST DOCUMENTED IN MEDICAL RECORD: ICD-10-PCS | Mod: ,,, | Performed by: DERMATOLOGY

## 2023-02-27 PROCEDURE — 1160F PR REVIEW ALL MEDS BY PRESCRIBER/CLIN PHARMACIST DOCUMENTED: ICD-10-PCS | Mod: ,,, | Performed by: DERMATOLOGY

## 2023-02-27 PROCEDURE — 1160F RVW MEDS BY RX/DR IN RCRD: CPT | Mod: ,,, | Performed by: DERMATOLOGY

## 2023-02-27 PROCEDURE — 99214 OFFICE O/P EST MOD 30 MIN: CPT | Mod: ,,, | Performed by: DERMATOLOGY

## 2023-02-27 RX ORDER — AMITRIPTYLINE HYDROCHLORIDE 25 MG/1
TABLET, FILM COATED ORAL
Qty: 30 TABLET | Refills: 5 | Status: SHIPPED | OUTPATIENT
Start: 2023-02-27

## 2023-02-27 NOTE — PROGRESS NOTES
[facility]   Chanell Santiago MD    Patient Name: Evin Mtz  Patient YOB: 1965   Date of Service: 2/27/23    CC: Follow-up Prurigo Nodularis    HPI: Evin Mtz is a 57 y.o. male here today for follow-up of PN, last seen 08/22.  Previous treatments include amytriptyline and Rifampin.  Overall, the PN is improved.  Treatment plan was followed as directed. Pt is also following up for seborrheic dermatitis.    Past Medical History:   Diagnosis Date    GERD (gastroesophageal reflux disease)     Hypertension      Past Surgical History:   Procedure Laterality Date    ANTERIOR LUMBAR INTERBODY FUSION (ALIF) N/A 10/5/2021    Procedure: L4-L5 anterior lumbar interbody fusion;  Surgeon: Jayden Mtz MD;  Location: [facility];  Service: Neurosurgery;  Laterality: N/A;    ILIAC ARTERY STENT      JOINT REPLACEMENT Right     tka    LAPAROSCOPIC REPAIR OF HIATAL HERNIA      ROBOTIC FUSION,SPINE,LUMBAR,TLIF N/A 11/10/2022    Procedure: ROBOTIC FUSION,SPINE,LUMBAR,TLIF; [facility] OR 3A, 11/10/22 first case; Procedure: L3-S1 decompression and fusion, L5-S1 TLIF; Instrumentation: [employer]; Marino table, Equipment: C arm, O arm, Robot; Neuro-Monitoring: Yes (EMG, SSEP, MEP);  Surgeon: Seb Banegas MD;  Location: [facility];  Service: Neurosurgery;  Laterality: N/A;    SINUS SURGERY       Review of patient's allergies indicates:   Allergen Reactions    Aspirin Other (See Comments)     Gi symptoms-burning       Current Outpatient Medications:     amitriptyline (ELAVIL) 25 MG tablet, TAKE ONE (1) TABLET BY MOUTH EACH EVENING, Disp: 30 tablet, Rfl: 5    amoxicillin (AMOXIL) 875 MG tablet, Take 1 tablet (875 mg total) by mouth every 12 (twelve) hours., Disp: 14 tablet, Rfl: 0    baclofen (LIORESAL) 10 MG tablet, , Disp: , Rfl:     clindamycin-benzoyl peroxide gel, Apply topically 2 (two) times daily., Disp: 45 g, Rfl: 2    cyclobenzaprine (FLEXERIL) 10 MG tablet, TAKE ONE (1) TABLET  BY MOUTH THREE  (3)  TIMES DAILY AS NEEDED FOR MUSCLE SPASMS., Disp: 30 tablet, Rfl: 1    desonide (DESOWEN) 0.05 % cream, Apply to AA on face BID PRN flares tapering with improvement, Disp: 60 g, Rfl: 3    dextroamphetamine-amphetamine (ADDERALL) 20 mg tablet, Take 1 tablet by mouth 2 (two) times a day. , Disp: , Rfl:     ergocalciferol, vitamin D2, (VITAMIN D ORAL), Take 300 mg by mouth., Disp: , Rfl:     esomeprazole (NEXIUM) 40 MG capsule, TAKE ONE (1) CAPSULE  BY MOUTH BEFORE BREAKFAST., Disp: 90 capsule, Rfl: 1    furosemide (LASIX) 40 MG tablet, Take 1 tablet (40 mg total) by mouth once daily., Disp: 90 tablet, Rfl: 3    neomycin-polymyxin-hydrocortisone (CORTISPORIN) 3.5-10,000-1 mg/mL-unit/mL-% otic suspension, Place 3 drops into the right ear 2 (two) times daily., Disp: 10 mL, Rfl: 0    pregabalin (LYRICA) 150 MG capsule, , Disp: , Rfl:     rifAMpin (RIFADIN) 300 MG capsule, Take 1 capsule (300 mg total) by mouth every 12 (twelve) hours., Disp: 14 capsule, Rfl: 0    tadalafiL (CIALIS) 20 MG Tab, Take 1 tablet (20 mg total) by mouth daily as needed., Disp: 8 tablet, Rfl: 5    triamcinolone acetonide 0.1% (KENALOG) 0.1 % ointment, Apply topically 2 (two) times daily., Disp: 80 g, Rfl: 5    ROS: A focused review of systems was obtained and negative.     Exam: A focused skin exam was performed. All areas examined were normal except as mentioned in the assessment and plan below.  General Appearance of the patient is well developed and well nourished.  Orientation: alert and oriented x 3.  Mood and affect: pleasant.    Assessment:   The primary encounter diagnosis was Seborrheic dermatitis. A diagnosis of Prurigo nodularis was also pertinent to this visit.    Plan:   Medications Ordered This Encounter   Medications    amitriptyline (ELAVIL) 25 MG tablet     Sig: TAKE ONE (1) TABLET BY MOUTH EACH EVENING     Dispense:  30 tablet     Refill:  5       Prurigo Nodularis  - erythematous nodules with central erosions located on the  bilateral arms  Status: Improved but at treatment goal     Plan: Counseling  I counseled the patient regarding the following:  Skin care: Recommend trimming nails short, anti-itch lotions such as Sarna, topical steroids and antihistamines.  Expectations: Prurigo Nodularis is a self-inflicted lesion that results from picking or rubbing the same spot of skin over and over again. If the itch-scratch cycle is broken, the lesions will resolve.  Contact office if: Prurigo Nodularis worsens, or if itching is accompanied by constitutional symptoms    - Will refill amytriptyline to continue nightly  - will consider dupixent if he does not continue to improve on amitriptyline    Seborrheic Dermatitis (L21.8)  - Pink/orange scaly plaques located on the scalp, nasolabial folds, and eyebrows    Status: Inadequately controlled     Plan: Counseling.  I counseled the patient regarding the following:  Skin care: Emollients, shampoos with tar, selenium or zinc pyrithione can improve seborrheic dermatitis.  Expectations: Seborrheic Dermatitis is chronic in nature with periods of remissions and flares. Flares can be  triggered by stress.  Contact office if: Seborrheic dermatitis worsens, or fails to improve despite several months of treatment.    Plan: Prescription   - continue Ketoconazole cream but increase frequency to prevent flares     Follow up in about 6 months (around 8/27/2023) for PNRina Santiago MD

## 2023-03-06 RX ORDER — CYCLOBENZAPRINE HCL 10 MG
TABLET ORAL
Qty: 30 TABLET | Refills: 2 | Status: SHIPPED | OUTPATIENT
Start: 2023-03-06 | End: 2023-05-30 | Stop reason: SDUPTHER

## 2023-04-06 DIAGNOSIS — L28.1 PRURIGO NODULARIS: ICD-10-CM

## 2023-04-06 DIAGNOSIS — L30.8 ASTEATOTIC ECZEMA: ICD-10-CM

## 2023-04-18 ENCOUNTER — HOSPITAL ENCOUNTER (OUTPATIENT)
Dept: RADIOLOGY | Facility: HOSPITAL | Age: 58
Discharge: HOME | End: 2023-04-18
Attending: INTERNAL MEDICINE
Payer: COMMERCIAL

## 2023-04-18 DIAGNOSIS — R06.02 SHORTNESS OF BREATH: Primary | ICD-10-CM

## 2023-04-18 DIAGNOSIS — R06.02 SHORTNESS OF BREATH: ICD-10-CM

## 2023-04-18 PROCEDURE — 71046 XR CHEST PA AND LATERAL: ICD-10-PCS | Mod: 26,,, | Performed by: RADIOLOGY

## 2023-04-18 PROCEDURE — 71046 X-RAY EXAM CHEST 2 VIEWS: CPT | Mod: 26,,, | Performed by: RADIOLOGY

## 2023-04-18 PROCEDURE — 71046 X-RAY EXAM CHEST 2 VIEWS: CPT | Mod: TC

## 2023-04-18 RX ORDER — ALBUTEROL SULFATE 90 UG/1
2 AEROSOL, METERED RESPIRATORY (INHALATION) EVERY 6 HOURS PRN
Qty: 18 G | Refills: 0 | Status: SHIPPED | OUTPATIENT
Start: 2023-04-18 | End: 2023-05-30 | Stop reason: SDUPTHER

## 2023-05-24 RX ORDER — DULOXETIN HYDROCHLORIDE 30 MG/1
30 CAPSULE, DELAYED RELEASE ORAL
COMMUNITY
Start: 2023-03-03

## 2023-05-24 RX ORDER — HYDROCODONE BITARTRATE AND ACETAMINOPHEN 10; 325 MG/1; MG/1
1 TABLET ORAL 4 TIMES DAILY PRN
COMMUNITY
Start: 2023-03-30

## 2023-05-24 RX ORDER — CEPHALEXIN 500 MG/1
500 CAPSULE ORAL 3 TIMES DAILY
COMMUNITY
Start: 2023-01-31

## 2023-05-24 RX ORDER — ASPIRIN 81 MG/1
TABLET ORAL
COMMUNITY
Start: 2023-01-03

## 2023-05-24 RX ORDER — IBUPROFEN 100 MG/5ML
SUSPENSION, ORAL (FINAL DOSE FORM) ORAL
COMMUNITY
Start: 2023-01-03

## 2023-05-24 RX ORDER — CHOLECALCIFEROL (VITAMIN D3) 25 MCG
TABLET ORAL
COMMUNITY
Start: 2023-01-03

## 2023-05-30 ENCOUNTER — OFFICE VISIT (OUTPATIENT)
Dept: INTERNAL MEDICINE | Facility: CLINIC | Age: 58
End: 2023-05-30
Payer: COMMERCIAL

## 2023-05-30 VITALS
OXYGEN SATURATION: 96 % | WEIGHT: 211 LBS | HEART RATE: 73 BPM | SYSTOLIC BLOOD PRESSURE: 142 MMHG | TEMPERATURE: 98 F | DIASTOLIC BLOOD PRESSURE: 64 MMHG | HEIGHT: 69 IN | RESPIRATION RATE: 18 BRPM | BODY MASS INDEX: 31.25 KG/M2

## 2023-05-30 DIAGNOSIS — G89.4 CHRONIC PAIN SYNDROME: ICD-10-CM

## 2023-05-30 DIAGNOSIS — I73.9 PVD (PERIPHERAL VASCULAR DISEASE): Chronic | ICD-10-CM

## 2023-05-30 DIAGNOSIS — I10 ESSENTIAL HYPERTENSION: ICD-10-CM

## 2023-05-30 DIAGNOSIS — K21.9 GASTROESOPHAGEAL REFLUX DISEASE, UNSPECIFIED WHETHER ESOPHAGITIS PRESENT: ICD-10-CM

## 2023-05-30 DIAGNOSIS — I87.2 VENOUS INSUFFICIENCY: ICD-10-CM

## 2023-05-30 DIAGNOSIS — Z09 FOLLOW-UP EXAM: Primary | ICD-10-CM

## 2023-05-30 DIAGNOSIS — R53.83 FATIGUE, UNSPECIFIED TYPE: ICD-10-CM

## 2023-05-30 PROCEDURE — 99215 OFFICE O/P EST HI 40 MIN: CPT | Mod: PBBFAC | Performed by: INTERNAL MEDICINE

## 2023-05-30 PROCEDURE — 3008F PR BODY MASS INDEX (BMI) DOCUMENTED: ICD-10-PCS | Mod: ,,, | Performed by: INTERNAL MEDICINE

## 2023-05-30 PROCEDURE — 3078F PR MOST RECENT DIASTOLIC BLOOD PRESSURE < 80 MM HG: ICD-10-PCS | Mod: ,,, | Performed by: INTERNAL MEDICINE

## 2023-05-30 PROCEDURE — 3078F DIAST BP <80 MM HG: CPT | Mod: ,,, | Performed by: INTERNAL MEDICINE

## 2023-05-30 PROCEDURE — 99214 PR OFFICE/OUTPT VISIT, EST, LEVL IV, 30-39 MIN: ICD-10-PCS | Mod: S$PBB,,, | Performed by: INTERNAL MEDICINE

## 2023-05-30 PROCEDURE — 3077F SYST BP >= 140 MM HG: CPT | Mod: ,,, | Performed by: INTERNAL MEDICINE

## 2023-05-30 PROCEDURE — 3077F PR MOST RECENT SYSTOLIC BLOOD PRESSURE >= 140 MM HG: ICD-10-PCS | Mod: ,,, | Performed by: INTERNAL MEDICINE

## 2023-05-30 PROCEDURE — 99214 OFFICE O/P EST MOD 30 MIN: CPT | Mod: S$PBB,,, | Performed by: INTERNAL MEDICINE

## 2023-05-30 PROCEDURE — 1159F PR MEDICATION LIST DOCUMENTED IN MEDICAL RECORD: ICD-10-PCS | Mod: ,,, | Performed by: INTERNAL MEDICINE

## 2023-05-30 PROCEDURE — 1159F MED LIST DOCD IN RCRD: CPT | Mod: ,,, | Performed by: INTERNAL MEDICINE

## 2023-05-30 PROCEDURE — 3008F BODY MASS INDEX DOCD: CPT | Mod: ,,, | Performed by: INTERNAL MEDICINE

## 2023-05-30 RX ORDER — FLUTICASONE PROPIONATE 50 MCG
1 SPRAY, SUSPENSION (ML) NASAL DAILY
Qty: 16 G | Refills: 3 | Status: SHIPPED | OUTPATIENT
Start: 2023-05-30

## 2023-05-30 RX ORDER — ALBUTEROL SULFATE 90 UG/1
2 AEROSOL, METERED RESPIRATORY (INHALATION) EVERY 6 HOURS PRN
Qty: 1 G | Refills: 0 | Status: CANCELLED | OUTPATIENT
Start: 2023-05-30 | End: 2027-03-19

## 2023-05-30 RX ORDER — ALBUTEROL SULFATE 90 UG/1
2 AEROSOL, METERED RESPIRATORY (INHALATION) EVERY 6 HOURS PRN
Qty: 18 G | Refills: 3 | Status: SHIPPED | OUTPATIENT
Start: 2023-05-30 | End: 2024-05-29

## 2023-05-30 RX ORDER — CYCLOBENZAPRINE HCL 10 MG
TABLET ORAL
Qty: 30 TABLET | Refills: 2 | Status: SHIPPED | OUTPATIENT
Start: 2023-05-30

## 2023-05-30 NOTE — PROGRESS NOTES
Subjective:       Patient ID: Evin Mtz is a 57 y.o. male.    Chief Complaint: Fatigue (Pt has had no energy. )    The patient is a 56-year-old white male the presents today to establish.  We saw him about 6 weeks ago for surgical pre-op risk stratification. He underwent L4-L5 fusion on October 5th. He is recovering well but still has some pain. He has a history of hypertension, GERD, venous insufficiency.  No known history of coronary artery disease, cerebrovascular disease, or CHF, he denies any chest pain at rest or with moderate exertion.  He had a left heart catheterization around 2018 that showed normal coronaries.  He underwent right total knee replacement in 2020 without any complications.  No significant changes in his health since then.  He has chronic bilateral lower extremity edema.  He had a stent to the iliac vein on the right.  This did not improve his symptoms.  He has been seen by vascular surgery and also by vein specialist without much improvement in his symptoms.  Today he is resting comfortably in no distress.  He is afebrile and vital signs are stable.    1/24-the patient presents today for follow-up.  His biggest issue right now is swelling in his left lower extremity.  He has been seen by the vein clinic and they recommended lymphatic pumps but the patient states that he does not have time to do this.  He wants to know if there is anything that can be done to fix this.  He would like to be referred back to Dr. Stern in [facility].  He states the Lasix does help some.  He does need a refill on this.  Outside of his swelling is doing okay.  Blood pressure looks good.  He is resting comfortably in no distress.  He is afebrile vital signs are stable.    7/13- The patient presents today for follow up. Since we last saw him he has been seen by vascular clinic in [place]. They believe that his venous stent needs to be increased. Once his wife heals up from recent surgery he is going  to schedule an appointment to have this done. He continues to have issues with his prurigo nodularis with infection at times. He is not currently using a steroid cream. Only the clindamycin gel. He is on lyrica for chronic pain but this may also provide some benefit for the prurigo nodularis. Otherwise the patient is resting comfortably in no distress. He is afebrile and vital signs are stable.    5/30/23-the patient presents today for follow-up.  Since we last saw him he underwent surgical repair of his lumbar spine at [facility].  Surgery went well and he states that after the surgery for a while he did not have any swelling in his lower extremities.  Then about 6 weeks ago he was doing some work and felt an unusual sensation in his back.  After that he started having swelling again.  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  He continues to follow with dermatology.  His biggest complaint is fatigue.  He is resting comfortably today in no distress.  He is afebrile and vital signs are stable.    Fatigue  Associated symptoms include fatigue. Pertinent negatives include no abdominal pain, arthralgias, chest pain, chills, coughing, fever, headaches, joint swelling, myalgias, nausea, neck pain, rash, sore throat or weakness.   Follow-up  Associated symptoms include fatigue. Pertinent negatives include no abdominal pain, arthralgias, chest pain, chills, coughing, fever, headaches, joint swelling, myalgias, nausea, neck pain, rash, sore throat or weakness.   Review of Systems   Constitutional:  Positive for fatigue. Negative for appetite change, chills and fever.   HENT:  Negative for ear pain, hearing loss, sinus pressure/congestion and sore throat.    Eyes:  Negative for pain, redness and visual disturbance.   Respiratory:  Negative for apnea, cough, shortness of breath and wheezing.    Cardiovascular:  Positive for leg swelling. Negative for chest pain and palpitations.   Gastrointestinal:   Negative for abdominal pain, blood in stool, constipation, diarrhea and nausea.   Endocrine: Negative for cold intolerance, heat intolerance and polyuria.   Genitourinary:  Negative for dysuria and hematuria.   Musculoskeletal:  Positive for back pain. Negative for arthralgias, joint swelling, myalgias and neck pain.   Integumentary:  Negative for pallor, rash and mole/lesion.   Allergic/Immunologic: Negative for frequent infections.   Neurological:  Negative for tremors, seizures, weakness and headaches.   Hematological:  Negative for adenopathy.   Psychiatric/Behavioral:  Negative for confusion, dysphoric mood and sleep disturbance. The patient is not nervous/anxious.        Objective:      Physical Exam  Vitals and nursing note reviewed.   Constitutional:       General: He is not in acute distress.     Appearance: Normal appearance. He is not ill-appearing.   HENT:      Head: Normocephalic and atraumatic.      Right Ear: External ear normal.      Left Ear: External ear normal.      Nose: Nose normal.      Mouth/Throat:      Pharynx: Oropharynx is clear.   Eyes:      Extraocular Movements: Extraocular movements intact.      Conjunctiva/sclera: Conjunctivae normal.      Pupils: Pupils are equal, round, and reactive to light.   Neck:      Vascular: No carotid bruit.   Cardiovascular:      Rate and Rhythm: Normal rate and regular rhythm.      Pulses: Normal pulses.      Heart sounds: Normal heart sounds. No murmur heard.  Pulmonary:      Effort: No respiratory distress.      Breath sounds: Normal breath sounds. No wheezing or rales.   Abdominal:      General: Bowel sounds are normal. There is no distension.      Palpations: Abdomen is soft.   Musculoskeletal:         General: Normal range of motion.      Cervical back: Normal range of motion and neck supple.      Right lower leg: Edema present.      Left lower leg: Edema present.      Comments: Left > right   Skin:     General: Skin is warm and dry.      Capillary  Refill: Capillary refill takes less than 2 seconds.      Coloration: Skin is not pale.      Findings: Lesion present.   Neurological:      General: No focal deficit present.      Mental Status: He is alert and oriented to person, place, and time.      Cranial Nerves: No cranial nerve deficit.   Psychiatric:         Mood and Affect: Mood normal.         Judgment: Judgment normal.       Assessment:       Problem List Items Addressed This Visit          Neuro    Chronic pain syndrome       Cardiac/Vascular    PVD (peripheral vascular disease) (Chronic)    Essential hypertension    Venous insufficiency       GI    Gastroesophageal reflux disease     Other Visit Diagnoses       Follow-up exam    -  Primary    Fatigue, unspecified type        Relevant Orders    Testosterone    T4, Free    TSH            Plan:       1. The patient presents today for follow up.  He had back surgery since we last saw him.  His back is stable    2. Essential hypertension-blood pressure is at goal.  Currently 142/64  He is on lisinopril 10 mg daily although he is not sure if he is currently taking.  He also takes Lasix p.r.n.    3. GERD-continue with PPI daily    4. Erectile dysfunction-he uses Cialis p.r.n.    5. Chronic venous disease-he can not tolerate compression hose.  He uses Lasix p.r.n.  This is his biggest issue currently. He has seen Dr. Reed and Dr. Stern in the past. We are going to refer back to Dr. Stern. He states that he cannot wear lymphatic pumps because of time. He wants to know if there is something else that can be done. For now we will continue with lasix 40 mg and he will take twice a day for the next couple of days. Elevate leg when seated. He has a history of venous stent and was on antiplatelet therapy for about 1 year. This is not needed any longer.  7/13- He was seen by vein clinic in [place] and they believe that he would benefit from and extension of his prior vein stent. He is going to set that up once his wife  has healed from recent surgery.  5/30/23-following his back surgery is swelling in his lower extremities improved for a while.  However over the last 6 weeks they have started swelling again.    6. Eczema/ prurigo nodularis- He has kenalog ointment. He has seen dermatology  7/13- Still having issues. He occasionally gets some infection in these lesions as well. Currently not using steroid cream. Only using 1% clindamycin gel. I have given refills for both and referred to Chanell Santiago.   5/30/23-he continues to follow with dermatology.  Still has issues at times    7. Fatigue-common complaint.  He has not had thyroid or testosterone checked so we will check that.    He will follow-up in 6 months or sooner if needed.

## 2023-06-13 RX ORDER — CLINDAMYCIN PHOSPHATE AND BENZOYL PEROXIDE 10; 50 MG/G; MG/G
GEL TOPICAL
Qty: 45 G | Refills: 2 | OUTPATIENT
Start: 2023-06-13

## 2023-06-15 ENCOUNTER — TELEPHONE (OUTPATIENT)
Dept: DERMATOLOGY | Facility: CLINIC | Age: 58
End: 2023-06-15
Payer: COMMERCIAL

## 2023-06-15 RX ORDER — CLINDAMYCIN PHOSPHATE 10 MG/G
GEL TOPICAL 2 TIMES DAILY
Qty: 60 G | Refills: 3 | Status: SHIPPED | OUTPATIENT
Start: 2023-06-15

## 2023-06-15 NOTE — TELEPHONE ENCOUNTER
----- Message from Braxton Langston sent at 6/15/2023  1:48 PM CDT -----  Pt wife called and stated pt needs more refills of the clindamycin gel sent to the pharmacy at [facility]      [phone]

## 2023-06-28 ENCOUNTER — TELEPHONE (OUTPATIENT)
Dept: DERMATOLOGY | Facility: CLINIC | Age: 58
End: 2023-06-28
Payer: COMMERCIAL

## 2023-06-28 NOTE — TELEPHONE ENCOUNTER
----- Message from Claudia Aaron CMA sent at 6/28/2023  4:19 PM CDT -----    ----- Message -----  From: Claudia Aaron CMA  Sent: 6/28/2023   4:12 PM CDT  To: Chanell Santiago MD, Braxton Langston, #    Called pt to let him know that  wants to see him to discuss other options, pt is coming tomorrow for appointment.   ----- Message -----  From: Braxton Langston  Sent: 6/28/2023   3:57 PM CDT  To: Chanell Santiago MD, Claudia Aaron CMA, #    Pt wife called and asked if Dr. Santiago could send him in some medication. She stated his skin condition is flaring and angry due to the heat.     Also wants a call back.        Pharmacy at [facility]

## 2023-06-29 ENCOUNTER — OFFICE VISIT (OUTPATIENT)
Dept: DERMATOLOGY | Facility: CLINIC | Age: 58
End: 2023-06-29
Payer: COMMERCIAL

## 2023-06-29 VITALS — HEIGHT: 69 IN | WEIGHT: 211 LBS | BODY MASS INDEX: 31.25 KG/M2

## 2023-06-29 DIAGNOSIS — L08.9 SKIN INFECTION: ICD-10-CM

## 2023-06-29 DIAGNOSIS — L28.1 PRURIGO NODULARIS: Primary | ICD-10-CM

## 2023-06-29 PROCEDURE — 87077 CULTURE, WOUND: ICD-10-PCS | Mod: ,,, | Performed by: CLINICAL MEDICAL LABORATORY

## 2023-06-29 PROCEDURE — 3008F BODY MASS INDEX DOCD: CPT | Mod: ,,, | Performed by: DERMATOLOGY

## 2023-06-29 PROCEDURE — 87070 CULTURE OTHR SPECIMN AEROBIC: CPT | Mod: ,,, | Performed by: CLINICAL MEDICAL LABORATORY

## 2023-06-29 PROCEDURE — 87186 CULTURE, WOUND: ICD-10-PCS | Mod: ,,, | Performed by: CLINICAL MEDICAL LABORATORY

## 2023-06-29 PROCEDURE — 87070 CULTURE, WOUND: ICD-10-PCS | Mod: ,,, | Performed by: CLINICAL MEDICAL LABORATORY

## 2023-06-29 PROCEDURE — 3008F PR BODY MASS INDEX (BMI) DOCUMENTED: ICD-10-PCS | Mod: ,,, | Performed by: DERMATOLOGY

## 2023-06-29 PROCEDURE — 1159F MED LIST DOCD IN RCRD: CPT | Mod: ,,, | Performed by: DERMATOLOGY

## 2023-06-29 PROCEDURE — 87186 SC STD MICRODIL/AGAR DIL: CPT | Mod: ,,, | Performed by: CLINICAL MEDICAL LABORATORY

## 2023-06-29 PROCEDURE — 99214 OFFICE O/P EST MOD 30 MIN: CPT | Mod: ,,, | Performed by: DERMATOLOGY

## 2023-06-29 PROCEDURE — 1160F RVW MEDS BY RX/DR IN RCRD: CPT | Mod: ,,, | Performed by: DERMATOLOGY

## 2023-06-29 PROCEDURE — 99214 PR OFFICE/OUTPT VISIT, EST, LEVL IV, 30-39 MIN: ICD-10-PCS | Mod: ,,, | Performed by: DERMATOLOGY

## 2023-06-29 PROCEDURE — 87077 CULTURE AEROBIC IDENTIFY: CPT | Mod: ,,, | Performed by: CLINICAL MEDICAL LABORATORY

## 2023-06-29 PROCEDURE — 1159F PR MEDICATION LIST DOCUMENTED IN MEDICAL RECORD: ICD-10-PCS | Mod: ,,, | Performed by: DERMATOLOGY

## 2023-06-29 PROCEDURE — 1160F PR REVIEW ALL MEDS BY PRESCRIBER/CLIN PHARMACIST DOCUMENTED: ICD-10-PCS | Mod: ,,, | Performed by: DERMATOLOGY

## 2023-06-29 RX ORDER — DOXYCYCLINE 100 MG/1
100 CAPSULE ORAL EVERY 12 HOURS
Qty: 14 CAPSULE | Refills: 0 | Status: SHIPPED | OUTPATIENT
Start: 2023-06-29 | End: 2023-07-06

## 2023-06-29 RX ORDER — MUPIROCIN 20 MG/G
OINTMENT TOPICAL
Qty: 30 G | Refills: 3 | Status: SHIPPED | OUTPATIENT
Start: 2023-06-29

## 2023-06-29 NOTE — PROGRESS NOTES
[facility]   Chanell Santiago MD    Patient Name: Evin Mtz  Patient YOB: 1965   Date of Service: 6/29/23    CC: Follow-up PN    HPI: Evin Mtz is a 58 y.o. male here today for follow-up of PN, last seen 02/2023.  Previous treatments include desonide, keto cream, mupirocin, and elavil.  Overall, the PN is worse.  Treatment plan was followed as directed.    Past Medical History:   Diagnosis Date    GERD (gastroesophageal reflux disease)     Hypertension      Past Surgical History:   Procedure Laterality Date    ANTERIOR LUMBAR INTERBODY FUSION (ALIF) N/A 10/5/2021    Procedure: L4-L5 anterior lumbar interbody fusion;  Surgeon: Jayden Mtz MD;  Location: [facility];  Service: Neurosurgery;  Laterality: N/A;    ILIAC ARTERY STENT      JOINT REPLACEMENT Right     tka    LAPAROSCOPIC REPAIR OF HIATAL HERNIA      ROBOTIC FUSION,SPINE,LUMBAR,TLIF N/A 11/10/2022    Procedure: ROBOTIC FUSION,SPINE,LUMBAR,TLIF; [facility] OR 3A, 11/10/22 first case; Procedure: L3-S1 decompression and fusion, L5-S1 TLIF; Instrumentation: Medtronic; Marino table, Equipment: C arm, O arm, Robot; Neuro-Monitoring: Yes (EMG, SSEP, MEP);  Surgeon: Seb Banegas MD;  Location: [facility];  Service: Neurosurgery;  Laterality: N/A;    SINUS SURGERY       Review of patient's allergies indicates:   Allergen Reactions    Aspirin Other (See Comments)     Gi symptoms-burning       Current Outpatient Medications:     albuterol (VENTOLIN HFA) 90 mcg/actuation inhaler, Inhale 2 puffs into the lungs every 6 (six) hours as needed for Wheezing. Rescue, Disp: 18 g, Rfl: 3    amitriptyline (ELAVIL) 25 MG tablet, TAKE ONE (1) TABLET BY MOUTH EACH EVENING, Disp: 30 tablet, Rfl: 5    ascorbic acid, vitamin C, (VITAMIN C) 1000 MG tablet, Vitamin C 1000 MG Oral Tablet QTY: 0 tablet Days: 0 Refills: 0  Written: 01/03/23 Patient Instructions: qd, Disp: , Rfl:     aspirin (ECOTRIN) 81 MG EC tablet, Aspirin 81 MG Oral Tablet  Delayed Release QTY: 0 tablet Days: 0 Refills: 0  Written: 01/03/23 Patient Instructions: qd, Disp: , Rfl:     baclofen (LIORESAL) 10 MG tablet, , Disp: , Rfl:     cephALEXin (KEFLEX) 500 MG capsule, Take 500 mg by mouth 3 (three) times daily., Disp: , Rfl:     clindamycin phosphate 1% (CLINDAGEL) 1 % gel, Apply topically 2 (two) times daily., Disp: 60 g, Rfl: 3    clindamycin-benzoyl peroxide gel, Apply topically 2 (two) times daily., Disp: 45 g, Rfl: 2    cyclobenzaprine (FLEXERIL) 10 MG tablet, TAKE ONE (1) TABLET  BY MOUTH THREE (3)  TIMES DAILY AS NEEDED FOR MUSCLE SPASMS., Disp: 30 tablet, Rfl: 2    desonide (DESOWEN) 0.05 % cream, Apply to AA on face BID PRN flares tapering with improvement, Disp: 60 g, Rfl: 3    dextroamphetamine-amphetamine (ADDERALL) 20 mg tablet, Take 1 tablet by mouth 2 (two) times a day. , Disp: , Rfl:     doxycycline (VIBRAMYCIN) 100 MG Cap, Take 1 capsule (100 mg total) by mouth every 12 (twelve) hours. for 7 days, Disp: 14 capsule, Rfl: 0    DULoxetine (CYMBALTA) 30 MG capsule, Take 30 mg by mouth., Disp: , Rfl:     ergocalciferol, vitamin D2, (VITAMIN D ORAL), Take 300 mg by mouth., Disp: , Rfl:     esomeprazole (NEXIUM) 40 MG capsule, TAKE ONE (1) CAPSULE  BY MOUTH BEFORE BREAKFAST., Disp: 90 capsule, Rfl: 1    fluticasone propionate (FLONASE) 50 mcg/actuation nasal spray, 1 spray (50 mcg total) by Each Nostril route once daily., Disp: 16 g, Rfl: 3    furosemide (LASIX) 40 MG tablet, Take 1 tablet (40 mg total) by mouth once daily. (Patient not taking: Reported on 5/30/2023), Disp: 90 tablet, Rfl: 3    HYDROcodone-acetaminophen (NORCO)  mg per tablet, Take 1 tablet by mouth 4 (four) times daily as needed., Disp: , Rfl:     mupirocin (BACTROBAN) 2 % ointment, Apply to sores on arms 2-3 times daily, Disp: 30 g, Rfl: 3    pregabalin (LYRICA) 150 MG capsule, , Disp: , Rfl:     tadalafiL (CIALIS) 20 MG Tab, Take 1 tablet (20 mg total) by mouth daily as needed., Disp: 8 tablet,  Rfl: 5    triamcinolone acetonide 0.1% (KENALOG) 0.1 % ointment, Apply topically 2 (two) times daily., Disp: 80 g, Rfl: 5    vitamin D (VITAMIN D3) 1000 units Tab, Vitamin D (Cholecalciferol) 25 MCG (1000 UT) Oral Tablet QTY: 0 tablet Days: 0 Refills: 0  Written: 01/03/23 Patient Instructions: qd, Disp: , Rfl:     ROS: A focused review of systems was obtained and negative.     Exam: A focused skin exam was performed. All areas examined were normal except as mentioned in the assessment and plan below.  General Appearance of the patient is well developed and well nourished.  Orientation: alert and oriented x 3.  Mood and affect: pleasant.    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 A diagnosis of Skin infection was also pertinent to this visit.    Plan:   Prurigo Nodularis  - erythematous nodules with central erosions located on the arms  Severity: severe  Estimated nodule count: 10    Plan: Counseling  I counseled the patient regarding the following:  Skin care: Recommend trimming nails short, anti-itch lotions such as Sarna, topical steroids and antihistamines.  Expectations: Prurigo Nodularis is a self-inflicted lesion that results from picking or rubbing the same spot of skin over and over again. If the itch-scratch cycle is broken, the lesions will resolve.  Contact office if: Prurigo Nodularis worsens, or if itching is accompanied by constitutional symptoms.    - will continue amitriptyline but plan to add dupixent after acute infection is treated     Skin Infection, NOS   - honey colored crust associated with PN on arms     Plan: Counseling  I counseled the patient regarding the following:  Skin care: Patients with purulence or fluid collections should have their wounds re-opened, drained, cultured and irrigated. All wound infections should be treated with antibiotics.  Expectations: Wound Infections usually occur 4-7 days postoperatively. Patients exhibit, pain, rednes, swelling,  cellulitic changes and fever.  Contact Office if: Wound Infection fails to respond to treatment or worsens, patient develops a fever, or if redness spreads despite antibiotics.    A bacterial culture was obtained from the right arm    Medications Ordered This Encounter   Medications    doxycycline (VIBRAMYCIN) 100 MG Cap     Sig: Take 1 capsule (100 mg total) by mouth every 12 (twelve) hours. for 7 days     Dispense:  14 capsule     Refill:  0    mupirocin (BACTROBAN) 2 % ointment     Sig: Apply to sores on arms 2-3 times daily     Dispense:  30 g     Refill:  3         Follow up in 1 month (on 7/29/2023) for FU PN.    Chanell Santiago MD

## 2023-07-02 LAB — MICROORGANISM SPEC CULT: ABNORMAL

## 2023-07-27 DIAGNOSIS — I87.2 VENOUS INSUFFICIENCY: Primary | ICD-10-CM

## 2023-08-01 ENCOUNTER — OFFICE VISIT (OUTPATIENT)
Dept: DERMATOLOGY | Facility: CLINIC | Age: 58
End: 2023-08-01
Payer: COMMERCIAL

## 2023-08-01 DIAGNOSIS — L28.1 PRURIGO NODULARIS: Primary | ICD-10-CM

## 2023-08-01 DIAGNOSIS — L08.9 SKIN INFECTION: ICD-10-CM

## 2023-08-01 PROCEDURE — 87070 CULTURE, WOUND: ICD-10-PCS | Mod: ,,, | Performed by: CLINICAL MEDICAL LABORATORY

## 2023-08-01 PROCEDURE — 1160F PR REVIEW ALL MEDS BY PRESCRIBER/CLIN PHARMACIST DOCUMENTED: ICD-10-PCS | Mod: ,,, | Performed by: DERMATOLOGY

## 2023-08-01 PROCEDURE — 1159F PR MEDICATION LIST DOCUMENTED IN MEDICAL RECORD: ICD-10-PCS | Mod: ,,, | Performed by: DERMATOLOGY

## 2023-08-01 PROCEDURE — 99214 PR OFFICE/OUTPT VISIT, EST, LEVL IV, 30-39 MIN: ICD-10-PCS | Mod: ,,, | Performed by: DERMATOLOGY

## 2023-08-01 PROCEDURE — 87070 CULTURE OTHR SPECIMN AEROBIC: CPT | Mod: ,,, | Performed by: CLINICAL MEDICAL LABORATORY

## 2023-08-01 PROCEDURE — 1159F MED LIST DOCD IN RCRD: CPT | Mod: ,,, | Performed by: DERMATOLOGY

## 2023-08-01 PROCEDURE — 1160F RVW MEDS BY RX/DR IN RCRD: CPT | Mod: ,,, | Performed by: DERMATOLOGY

## 2023-08-01 PROCEDURE — 99214 OFFICE O/P EST MOD 30 MIN: CPT | Mod: ,,, | Performed by: DERMATOLOGY

## 2023-08-03 LAB — MICROORGANISM SPEC CULT: NORMAL

## 2023-08-22 ENCOUNTER — TELEPHONE (OUTPATIENT)
Dept: INTERNAL MEDICINE | Facility: CLINIC | Age: 58
End: 2023-08-22
Payer: COMMERCIAL

## 2023-08-22 NOTE — TELEPHONE ENCOUNTER
----- Message from Maria Teresa Muller sent at 8/22/2023  9:30 AM CDT -----  It need to be order internal for the referral

## 2023-08-22 NOTE — TELEPHONE ENCOUNTER
Spoke to pt spouse and let her know that referral has been fixed. I apologized for the inconvenience  and I was not aware that referral was placed incorrectly until today,almost one month after it was ordered. I have advised spouse if she does not hear something in the next few days to give me a call back. Spouse thanked me

## 2023-08-22 NOTE — TELEPHONE ENCOUNTER
Spouse showed up to Dr. Santos office to ask him about referral that was placed. He explained to spouse that he will look into it. Referral was placed on 07/27/2023. Spouse calls back to clinic and I explain this to her and that referral was placed for pt to be seen in [place] as they requested. I have given her the number for the referral center. Spouse thanked me and I advised her to call if she has any issues.

## 2023-08-29 DIAGNOSIS — M79.89 PAIN AND SWELLING OF LOWER EXTREMITY, UNSPECIFIED LATERALITY: Primary | ICD-10-CM

## 2023-08-29 DIAGNOSIS — I87.2 VENOUS INSUFFICIENCY: Primary | ICD-10-CM

## 2023-08-29 DIAGNOSIS — M79.606 PAIN AND SWELLING OF LOWER EXTREMITY, UNSPECIFIED LATERALITY: Primary | ICD-10-CM

## 2023-09-01 RX ORDER — ESOMEPRAZOLE MAGNESIUM 40 MG/1
CAPSULE, DELAYED RELEASE ORAL
Qty: 90 CAPSULE | Refills: 2 | Status: SHIPPED | OUTPATIENT
Start: 2023-09-01 | End: 2024-03-18

## 2023-09-14 RX ORDER — TADALAFIL 20 MG/1
TABLET ORAL
Qty: 8 TABLET | Refills: 5 | Status: SHIPPED | OUTPATIENT
Start: 2023-09-14 | End: 2023-11-28 | Stop reason: SDUPTHER

## 2023-09-21 ENCOUNTER — TELEPHONE (OUTPATIENT)
Dept: INTERNAL MEDICINE | Facility: CLINIC | Age: 58
End: 2023-09-21
Payer: COMMERCIAL

## 2023-09-21 NOTE — TELEPHONE ENCOUNTER
Spoke to pts spouse and she states pt is c/o knot on right LE. She states this spot is swollen, sore to touch and hurts when pt is walking. I have informed spouse that dr claros is out of the office until Monday but I have advised for pt to be seen in family practice as a walk in. Spouse has been advised to be down there at 1. She thanked me for getting back to her.

## 2023-09-21 NOTE — TELEPHONE ENCOUNTER
----- Message from Maria Teresa Muller sent at 9/21/2023  9:10 AM CDT -----  Patient wife called need you to give her a call concerning her .

## 2023-10-02 ENCOUNTER — TELEPHONE (OUTPATIENT)
Dept: INTERNAL MEDICINE | Facility: CLINIC | Age: 58
End: 2023-10-02
Payer: COMMERCIAL

## 2023-10-02 NOTE — TELEPHONE ENCOUNTER
Pt spouse stopped by clinic asking for pt to be seen. She states he has been having a productive cough, congestion, body aches, chills but no fever. I have spoke to dr claros, he recommends for pt to be seen at a walk in clinic. Spouse thanked me

## 2023-10-10 ENCOUNTER — CLINICAL SUPPORT (OUTPATIENT)
Dept: DERMATOLOGY | Facility: CLINIC | Age: 58
End: 2023-10-10
Payer: COMMERCIAL

## 2023-10-10 DIAGNOSIS — L28.1 PRURIGO NODULARIS: Primary | ICD-10-CM

## 2023-10-10 PROCEDURE — 96372 PR INJECTION,THERAP/PROPH/DIAG2ST, IM OR SUBCUT: ICD-10-PCS | Mod: ICN,,, | Performed by: DERMATOLOGY

## 2023-10-10 PROCEDURE — 96372 THER/PROPH/DIAG INJ SC/IM: CPT | Mod: ICN,,, | Performed by: DERMATOLOGY

## 2023-10-10 NOTE — PROGRESS NOTES
Plan: Biologic Injection Training  Who Received Training: The Patient  Medication: Dupixent    We discussed Dupixent injection. I demonstrated how to clean the skin and administer the medication.      NDC: 4013-8173-00  Lot: [id number]  Exp: 10/01/2025    Loading dose given today.    Dupixent 300mg injection given subcutaneous in the right abdomen per this RN.    Dupixent 300mg injection given subcutaneous in the left abdomen per this RN.      Esau Godinez RN

## 2023-10-10 NOTE — PATIENT INSTRUCTIONS
Reminders Regarding Your Injections:  Keep your medicine refrigerated in the original container at 36 to 46 degrees F. Do not freeze.  Do not use medication if it is frozen, , if the outer seal is broken, cloudy, discolored, or has flakes or particles in it. Do not drop medication. Call your pharmacist if this occurs.  Keep your medicine away from children.  Take your medicine out of the fridge for 30 minutes before injection while leaving it in the packaging. Do not warm the medication any other way. Use within the hour.  Wash your hands before injection and clean the injection site thoroughly with alcohol. Do not shake medicine.  Injection sites include the belly (2 inches away from belly button) or tops of thighs where fat can be pinched.  Do not inject into skin that is scarred, hard, scaly, tender, bruised or has skin plaques.  Do not throw your needles in the trash! Dispose of them in a sharps container or a heavy-duty plastic container with a lid that is upright and stable, leak-resistant and labeled as hazardous. Follow your community guidelines when disposing of sharps container located on the FDA's website www.fda.gov.  Do not attempt to re-use needles after injection.  Inject medicine at a 90 degree angle. Rotate injection sites. Do not get rid of the air bubble in the syringe. Do not inject through your clothes.  There may be liquid at the injection site. Do not rub injection site. You may apply gentle pressure with a cotton ball or cover the area with a bandaid for any bleeding.  If you have any questions, reach out to your pharmacist, Dr. Santiago's office, of the official support website of your medication.

## 2023-10-20 ENCOUNTER — OFFICE VISIT (OUTPATIENT)
Dept: VASCULAR SURGERY | Facility: CLINIC | Age: 58
End: 2023-10-20
Payer: COMMERCIAL

## 2023-10-20 ENCOUNTER — HOSPITAL ENCOUNTER (OUTPATIENT)
Dept: RADIOLOGY | Facility: OTHER | Age: 58
Discharge: HOME | End: 2023-10-20
Attending: SURGERY
Payer: COMMERCIAL

## 2023-10-20 VITALS
DIASTOLIC BLOOD PRESSURE: 72 MMHG | HEIGHT: 69 IN | WEIGHT: 211 LBS | SYSTOLIC BLOOD PRESSURE: 135 MMHG | BODY MASS INDEX: 31.25 KG/M2 | HEART RATE: 70 BPM

## 2023-10-20 DIAGNOSIS — I87.2 VENOUS INSUFFICIENCY: Primary | ICD-10-CM

## 2023-10-20 DIAGNOSIS — M79.89 PAIN AND SWELLING OF LOWER EXTREMITY, UNSPECIFIED LATERALITY: ICD-10-CM

## 2023-10-20 DIAGNOSIS — M79.606 PAIN AND SWELLING OF LOWER EXTREMITY, UNSPECIFIED LATERALITY: ICD-10-CM

## 2023-10-20 DIAGNOSIS — I89.0 LYMPHEDEMA: ICD-10-CM

## 2023-10-20 PROCEDURE — 3008F PR BODY MASS INDEX (BMI) DOCUMENTED: ICD-10-PCS | Mod: CPTII,S$GLB,, | Performed by: SURGERY

## 2023-10-20 PROCEDURE — 1159F MED LIST DOCD IN RCRD: CPT | Mod: CPTII,S$GLB,, | Performed by: SURGERY

## 2023-10-20 PROCEDURE — 3075F PR MOST RECENT SYSTOLIC BLOOD PRESS GE 130-139MM HG: ICD-10-PCS | Mod: CPTII,S$GLB,, | Performed by: SURGERY

## 2023-10-20 PROCEDURE — 93970 EXTREMITY STUDY: CPT | Mod: TC

## 2023-10-20 PROCEDURE — 93970 EXTREMITY STUDY: CPT | Mod: 26,,, | Performed by: RADIOLOGY

## 2023-10-20 PROCEDURE — 99204 OFFICE O/P NEW MOD 45 MIN: CPT | Mod: S$GLB,,, | Performed by: SURGERY

## 2023-10-20 PROCEDURE — 3078F PR MOST RECENT DIASTOLIC BLOOD PRESSURE < 80 MM HG: ICD-10-PCS | Mod: CPTII,S$GLB,, | Performed by: SURGERY

## 2023-10-20 PROCEDURE — 1159F PR MEDICATION LIST DOCUMENTED IN MEDICAL RECORD: ICD-10-PCS | Mod: CPTII,S$GLB,, | Performed by: SURGERY

## 2023-10-20 PROCEDURE — 99204 PR OFFICE/OUTPT VISIT, NEW, LEVL IV, 45-59 MIN: ICD-10-PCS | Mod: S$GLB,,, | Performed by: SURGERY

## 2023-10-20 PROCEDURE — 3008F BODY MASS INDEX DOCD: CPT | Mod: CPTII,S$GLB,, | Performed by: SURGERY

## 2023-10-20 PROCEDURE — 93970 US LOWER EXTREMITY VEINS BILATERAL INSUFFICIENCY: ICD-10-PCS | Mod: 26,,, | Performed by: RADIOLOGY

## 2023-10-20 PROCEDURE — 3078F DIAST BP <80 MM HG: CPT | Mod: CPTII,S$GLB,, | Performed by: SURGERY

## 2023-10-20 PROCEDURE — 3075F SYST BP GE 130 - 139MM HG: CPT | Mod: CPTII,S$GLB,, | Performed by: SURGERY

## 2023-10-20 NOTE — PROGRESS NOTES
Filippo Duarte MD, RPVI                                 [facility]                           [facility]                             10/20/2023    HPI:  Evin Mtz is a 58 y.o. male with   Patient Active Problem List   Diagnosis    Arthritis of left hip    Arthritis of right hip    Gastroesophageal reflux disease    Essential hypertension    Venous insufficiency    Spondylolisthesis, lumbar region    S/P lumbar fusion    PVD (peripheral vascular disease)    Chronic pain syndrome    Impingement syndrome of right shoulder    being managed by PCP and specialists who is here today for evaluation of BLE edema.  Patient states location is BLE occurring for yrs.  Associated signs and symptoms include pain.  Quality is aching and severity is 5/10.  Symptoms began yrs ago.  Alleviating factors include elevation.  Worsening factors include dependency.  Patient has been wearing compression stockings for greater than 3 months.  +FH of venous disease.  Symptoms do limit patient's functional status and daily activities.  no DVT history.  + R iliac vein stent in [place]; past venous interventions.  no low sodium diet.  no excessive water intake.    Pain with ambulating 100 ft.  Improves throughout the day.    Migraine with aura: no  PFO/ASD/right to left shunt: no  Pregnant: no  Breastfeeding: no    no MI  no Stroke  Tobacco use: denies    Past Medical History:   Diagnosis Date    GERD (gastroesophageal reflux disease)     Hypertension      Past Surgical History:   Procedure Laterality Date    ANTERIOR LUMBAR INTERBODY FUSION (ALIF) N/A 10/5/2021    Procedure: L4-L5 anterior lumbar interbody fusion;  Surgeon: Jayden Mtz MD;  Location: [facility];  Service: Neurosurgery;  Laterality: N/A;    ILIAC ARTERY STENT      JOINT REPLACEMENT Right     tka    LAPAROSCOPIC REPAIR OF HIATAL HERNIA      ROBOTIC FUSION,SPINE,LUMBAR,TLIF N/A 11/10/2022    Procedure: ROBOTIC FUSION,SPINE,LUMBAR,TLIF; [facility]  OR 3A, 11/10/22 first case; Procedure: L3-S1 decompression and fusion, L5-S1 TLIF; Instrumentation: Medtronic; Marino table, Equipment: C arm, O arm, Robot; Neuro-Monitoring: Yes (EMG, SSEP, MEP);  Surgeon: Seb Banegas MD;  Location: [facility] MAIN OR;  Service: Neurosurgery;  Laterality: N/A;    SINUS SURGERY       Family History   Problem Relation Age of Onset    No Known Problems Mother     Hypertension Father     Diabetes Sister     No Known Problems Brother     No Known Problems Maternal Aunt     No Known Problems Maternal Uncle     No Known Problems Paternal Aunt     No Known Problems Paternal Uncle     No Known Problems Maternal Grandmother     No Known Problems Maternal Grandfather     No Known Problems Paternal Grandmother     No Known Problems Paternal Grandfather      Social History     Socioeconomic History    Marital status:    Tobacco Use    Smoking status: Never    Smokeless tobacco: Never   Substance and Sexual Activity    Alcohol use: Never    Drug use: Never    Sexual activity: Not Currently     Social Determinants of Health     Financial Resource Strain: Low Risk  (11/11/2022)    Overall Financial Resource Strain (CARDIA)     Difficulty of Paying Living Expenses: Not hard at all   Food Insecurity: No Food Insecurity (11/11/2022)    Hunger Vital Sign     Worried About Running Out of Food in the Last Year: Never true     Ran Out of Food in the Last Year: Never true   Transportation Needs: No Transportation Needs (11/11/2022)    PRAPARE - Transportation     Lack of Transportation (Medical): No     Lack of Transportation (Non-Medical): No   Physical Activity: Insufficiently Active (11/11/2022)    Exercise Vital Sign     Days of Exercise per Week: 3 days     Minutes of Exercise per Session: 30 min   Stress: No Stress Concern Present (11/11/2022)    [employer] - Occupational Stress Questionnaire     Feeling of Stress : Not at all   Social Connections: Moderately  Integrated (11/11/2022)    Social Connection and Isolation Panel [NHANES]     Frequency of Communication with Friends and Family: Twice a week     Frequency of Social Gatherings with Friends and Family: Twice a week     Attends [employer] Services: 1 to 4 times per year     Active Member of Clubs or Organizations: No     Attends Club or Organization Meetings: Never     Marital Status:    Housing Stability: Low Risk  (11/11/2022)    Housing Stability Vital Sign     Unable to Pay for Housing in the Last Year: No     Number of Places Lived in the Last Year: 1     Unstable Housing in the Last Year: No       Current Outpatient Medications:     albuterol (VENTOLIN HFA) 90 mcg/actuation inhaler, Inhale 2 puffs into the lungs every 6 (six) hours as needed for Wheezing. Rescue, Disp: 18 g, Rfl: 3    amitriptyline (ELAVIL) 25 MG tablet, TAKE ONE (1) TABLET BY MOUTH EACH EVENING, Disp: 30 tablet, Rfl: 5    ascorbic acid, vitamin C, (VITAMIN C) 1000 MG tablet, Vitamin C 1000 MG Oral Tablet QTY: 0 tablet Days: 0 Refills: 0  Written: 01/03/23 Patient Instructions: qd, Disp: , Rfl:     aspirin (ECOTRIN) 81 MG EC tablet, Aspirin 81 MG Oral Tablet Delayed Release QTY: 0 tablet Days: 0 Refills: 0  Written: 01/03/23 Patient Instructions: qd, Disp: , Rfl:     baclofen (LIORESAL) 10 MG tablet, , Disp: , Rfl:     cephALEXin (KEFLEX) 500 MG capsule, Take 500 mg by mouth 3 (three) times daily., Disp: , Rfl:     clindamycin phosphate 1% (CLINDAGEL) 1 % gel, Apply topically 2 (two) times daily., Disp: 60 g, Rfl: 3    clindamycin-benzoyl peroxide gel, Apply topically 2 (two) times daily., Disp: 45 g, Rfl: 2    cyclobenzaprine (FLEXERIL) 10 MG tablet, TAKE ONE (1) TABLET  BY MOUTH THREE (3)  TIMES DAILY AS NEEDED FOR MUSCLE SPASMS., Disp: 30 tablet, Rfl: 2    desonide (DESOWEN) 0.05 % cream, Apply to AA on face BID PRN flares tapering with improvement, Disp: 60 g, Rfl: 3    dextroamphetamine-amphetamine (ADDERALL) 20 mg tablet, Take 1  tablet by mouth 2 (two) times a day. , Disp: , Rfl:     DULoxetine (CYMBALTA) 30 MG capsule, Take 30 mg by mouth., Disp: , Rfl:     ergocalciferol, vitamin D2, (VITAMIN D ORAL), Take 300 mg by mouth., Disp: , Rfl:     esomeprazole (NEXIUM) 40 MG capsule, TAKE ONE (1) CAPSULE BY MOUTH EACH MORNING BEFORE BREAKFAST, Disp: 90 capsule, Rfl: 2    fluticasone propionate (FLONASE) 50 mcg/actuation nasal spray, 1 spray (50 mcg total) by Each Nostril route once daily., Disp: 16 g, Rfl: 3    furosemide (LASIX) 40 MG tablet, Take 1 tablet (40 mg total) by mouth once daily., Disp: 90 tablet, Rfl: 3    HYDROcodone-acetaminophen (NORCO)  mg per tablet, Take 1 tablet by mouth 4 (four) times daily as needed., Disp: , Rfl:     mupirocin (BACTROBAN) 2 % ointment, Apply to sores on arms 2-3 times daily, Disp: 30 g, Rfl: 3    pregabalin (LYRICA) 150 MG capsule, , Disp: , Rfl:     tadalafiL (CIALIS) 20 MG Tab, TAKE ONE (1) TABLET (20 MG TOTAL) BY MOUTH DAILY AS NEEDED., Disp: 8 tablet, Rfl: 5    triamcinolone acetonide 0.1% (KENALOG) 0.1 % ointment, Apply topically 2 (two) times daily., Disp: 80 g, Rfl: 5    vitamin D (VITAMIN D3) 1000 units Tab, Vitamin D (Cholecalciferol) 25 MCG (1000 UT) Oral Tablet QTY: 0 tablet Days: 0 Refills: 0  Written: 01/03/23 Patient Instructions: qd, Disp: , Rfl:     REVIEW OF SYSTEMS:  General: No fevers or chills; ENT: No sore throat; Allergy and Immunology: no persistent infections; Hematological and Lymphatic: No history of bleeding or easy bruising; Endocrine: negative; Respiratory: no cough, shortness of breath, or wheezing; Cardiovascular: no chest pain or dyspnea on exertion; Gastrointestinal: no abdominal pain/back, change in bowel habits, or bloody stools; Genito-Urinary: no dysuria, trouble voiding, or hematuria; Musculoskeletal: edema; Neurological: no TIA or stroke symptoms; Psychiatric: no nervousness, anxiety or depression.    PHYSICAL EXAM:      Pulse: 70         General appearance:  " Alert, well-appearing, and in no distress.  Oriented to person, place, and time                    Neurological: Normal speech, no focal findings noted; CN II - XII grossly intact. RLE with sensation to light touch, LLE with sensation to light touch.            Musculoskeletal: Digits/nail without cyanosis/clubbing.  Strength 5/5 BLE.                    Neck: Supple, no significant adenopathy                  Chest:  No wheezes, symmetric air entry. No use of accessory muscles               Cardiac: Normal rate and regular rhythm            Abdomen: Soft, nontender, nondistended      Extremities:   2+ R DP pulse, 2+ L DP pulse      2+ RLE edema, 2+ LLE edema    Skin:  RLE no ulcer; LLE no ulcer      RLE no spider veins, LLE no spider veins      RLE no varicose veins, LLE no varicose veins    CEAP 3/3    VCSS 8    LAB RESULTS:  No results found for: "CBC"  Lab Results   Component Value Date    LABPROT 10.5 11/09/2022    INR 0.90 11/09/2022     Lab Results   Component Value Date     11/14/2022    K 3.8 11/14/2022     11/14/2022    CO2 27 11/14/2022     11/14/2022    BUN 13 11/14/2022    CREATININE 0.7 11/14/2022    CALCIUM 9.1 11/14/2022    ANIONGAP 8 11/14/2022    EGFRNONAA 104 04/21/2022     Lab Results   Component Value Date    WBC 6.04 11/14/2022    RBC 3.38 (L) 11/14/2022    HGB 10.8 (L) 11/14/2022    HCT 30.7 (L) 11/14/2022    MCV 91 11/14/2022    MCH 32.0 (H) 11/14/2022    MCHC 35.2 (H) 11/14/2022    RDW 12.6 11/14/2022     11/14/2022    MPV 9.8 11/14/2022    GRAN 9.6 (H) 11/11/2022    GRAN 82.0 (H) 11/11/2022    LYMPH 1.2 11/11/2022    LYMPH 10.1 (L) 11/11/2022    MONO 0.9 11/11/2022    MONO 7.8 11/11/2022    EOS 0.0 11/11/2022    BASO 0.01 11/11/2022    EOSINOPHIL 0.0 11/11/2022    BASOPHIL 0.1 11/11/2022    DIFFMETHOD Automated 11/11/2022     .No results found for: "HGBA1C"    IMAGING:  All pertinent imaging has been reviewed and interpreted independently.    Venous US 10/2023 " Impression:  FINDINGS:  Deep venous system:     There is evidence of flow, compressibility and augmentation within bilateral common femoral, femoral, and popliteal veins.  Flow is seen within bilateral peroneal, posterior tibial and anterior tibial veins.     Superficial venous system:     Right leg:     Reflux measuring 1788 milliseconds noted at the greater saphenous vein @ below the knee.  No significant reflux in the lesser saphenous vein.     The maximal diameter within the right greater saphenous vein is 0.58 cm.     The maximal diameter within the right lesser saphenous vein is 0.42cm.     Left leg:     Reflux measuring 3677 milliseconds at the greater saphenous vein @ mid calf.  No significant reflux in the lesser saphenous vein.     The maximal diameter within the left greater saphenous vein is 0.59 cm.     The maximal diameter within the left lesser saphenous vein is 0.35cm.     Impression:     Hemodynamically significant venous reflux (>500 ms) in the right and left greater saphenous veins, as above.     No evidence of DVT in the lower extremities.        Electronically signed by: Duy Michaud MD  Date:                                            10/20/2023    IMP/PLAN:  58 y.o. male with   Patient Active Problem List   Diagnosis    Arthritis of left hip    Arthritis of right hip    Gastroesophageal reflux disease    Essential hypertension    Venous insufficiency    Spondylolisthesis, lumbar region    S/P lumbar fusion    PVD (peripheral vascular disease)    Chronic pain syndrome    Impingement syndrome of right shoulder    being managed by PCP and specialists who is here today for evaluation of BLE edema, pain.    -CTV and venous US to eval iliac veins and stent  -lymphedema clinic and pumps  -recommend compression with Rx stockings, elevation, dietary changes associated with water and sodium intake discussed at length with patient  -Exercise   -RTC 3 months for further evaluation    I spent 15 minutes  evaluating this patient and greater than 50% of the time was spent counseling, coordinator care and discussing the plan of care.  All questions were answered and patient stated understanding with agreement with the above treatment plan.    Filippo Duarte MD [facility]  Vascular and Endovascular Surgery

## 2023-10-27 DIAGNOSIS — I89.0 LYMPHEDEMA: ICD-10-CM

## 2023-10-27 DIAGNOSIS — I87.2 VENOUS INSUFFICIENCY: Primary | ICD-10-CM

## 2023-10-27 DIAGNOSIS — L28.1 PRURIGO NODULARIS: ICD-10-CM

## 2023-11-01 ENCOUNTER — TELEPHONE (OUTPATIENT)
Dept: VASCULAR SURGERY | Facility: CLINIC | Age: 58
End: 2023-11-01
Payer: COMMERCIAL

## 2023-11-01 NOTE — TELEPHONE ENCOUNTER
Pt wanted to get US and CTA done in [place], canceled appts at [facility] and called imaging in [place] ([phone]) to let them know they could go ahead and schedule pt.

## 2023-11-14 ENCOUNTER — HOSPITAL ENCOUNTER (OUTPATIENT)
Dept: RADIOLOGY | Facility: HOSPITAL | Age: 58
Discharge: HOME | End: 2023-11-14
Attending: SURGERY
Payer: COMMERCIAL

## 2023-11-14 DIAGNOSIS — I87.2 VENOUS INSUFFICIENCY: ICD-10-CM

## 2023-11-14 DIAGNOSIS — I89.0 LYMPHEDEMA: ICD-10-CM

## 2023-11-14 LAB — CREAT SERPL-MCNC: 0.8 MG/DL (ref 0.6–1.3)

## 2023-11-14 PROCEDURE — 93978 VASCULAR STUDY: CPT | Mod: TC

## 2023-11-14 PROCEDURE — 74174 CTA ABD&PLVS W/CONTRAST: CPT | Mod: TC

## 2023-11-14 PROCEDURE — 25500020 PHARM REV CODE 255: Performed by: SURGERY

## 2023-11-14 PROCEDURE — 74174: ICD-10-PCS | Mod: 26,,, | Performed by: RADIOLOGY

## 2023-11-14 PROCEDURE — 93978 US DOPP ILIACS BILATERAL: ICD-10-PCS | Mod: 26,,, | Performed by: RADIOLOGY

## 2023-11-14 PROCEDURE — 93978 VASCULAR STUDY: CPT | Mod: 26,,, | Performed by: RADIOLOGY

## 2023-11-14 PROCEDURE — 82565 ASSAY OF CREATININE: CPT

## 2023-11-14 PROCEDURE — 74174 CTA ABD&PLVS W/CONTRAST: CPT | Mod: 26,,, | Performed by: RADIOLOGY

## 2023-11-14 RX ADMIN — IOPAMIDOL 150 ML: 755 INJECTION, SOLUTION INTRAVENOUS at 10:11

## 2023-11-27 RX ORDER — DUPILUMAB 300 MG/2ML
INJECTION, SOLUTION SUBCUTANEOUS
COMMUNITY
Start: 2023-11-13

## 2023-11-28 ENCOUNTER — OFFICE VISIT (OUTPATIENT)
Dept: INTERNAL MEDICINE | Facility: CLINIC | Age: 58
End: 2023-11-28
Payer: COMMERCIAL

## 2023-11-28 VITALS
OXYGEN SATURATION: 97 % | BODY MASS INDEX: 33.47 KG/M2 | WEIGHT: 226 LBS | RESPIRATION RATE: 18 BRPM | TEMPERATURE: 97 F | HEART RATE: 87 BPM | HEIGHT: 69 IN | DIASTOLIC BLOOD PRESSURE: 76 MMHG | SYSTOLIC BLOOD PRESSURE: 132 MMHG

## 2023-11-28 DIAGNOSIS — I73.9 PVD (PERIPHERAL VASCULAR DISEASE): Chronic | ICD-10-CM

## 2023-11-28 DIAGNOSIS — D64.9 NORMOCYTIC ANEMIA: ICD-10-CM

## 2023-11-28 DIAGNOSIS — K21.9 GASTROESOPHAGEAL REFLUX DISEASE, UNSPECIFIED WHETHER ESOPHAGITIS PRESENT: ICD-10-CM

## 2023-11-28 DIAGNOSIS — Z09 FOLLOW-UP EXAM: Primary | ICD-10-CM

## 2023-11-28 DIAGNOSIS — I10 ESSENTIAL HYPERTENSION: ICD-10-CM

## 2023-11-28 DIAGNOSIS — G89.4 CHRONIC PAIN SYNDROME: ICD-10-CM

## 2023-11-28 PROCEDURE — 99214 PR OFFICE/OUTPT VISIT, EST, LEVL IV, 30-39 MIN: ICD-10-PCS | Mod: S$PBB,,, | Performed by: INTERNAL MEDICINE

## 2023-11-28 PROCEDURE — 3075F PR MOST RECENT SYSTOLIC BLOOD PRESS GE 130-139MM HG: ICD-10-PCS | Mod: ,,, | Performed by: INTERNAL MEDICINE

## 2023-11-28 PROCEDURE — 3075F SYST BP GE 130 - 139MM HG: CPT | Mod: ,,, | Performed by: INTERNAL MEDICINE

## 2023-11-28 PROCEDURE — 99999PBSHW FLU VACCINE (QUAD) GREATER THAN OR EQUAL TO 3YO PRESERVATIVE FREE IM: ICD-10-PCS | Mod: PBBFAC,,,

## 2023-11-28 PROCEDURE — 99214 OFFICE O/P EST MOD 30 MIN: CPT | Mod: S$PBB,,, | Performed by: INTERNAL MEDICINE

## 2023-11-28 PROCEDURE — 3078F DIAST BP <80 MM HG: CPT | Mod: ,,, | Performed by: INTERNAL MEDICINE

## 2023-11-28 PROCEDURE — 3008F BODY MASS INDEX DOCD: CPT | Mod: ,,, | Performed by: INTERNAL MEDICINE

## 2023-11-28 PROCEDURE — 3078F PR MOST RECENT DIASTOLIC BLOOD PRESSURE < 80 MM HG: ICD-10-PCS | Mod: ,,, | Performed by: INTERNAL MEDICINE

## 2023-11-28 PROCEDURE — 99999PBSHW FLU VACCINE (QUAD) GREATER THAN OR EQUAL TO 3YO PRESERVATIVE FREE IM: Mod: PBBFAC,,,

## 2023-11-28 PROCEDURE — 1159F MED LIST DOCD IN RCRD: CPT | Mod: ,,, | Performed by: INTERNAL MEDICINE

## 2023-11-28 PROCEDURE — 99215 OFFICE O/P EST HI 40 MIN: CPT | Mod: PBBFAC | Performed by: INTERNAL MEDICINE

## 2023-11-28 PROCEDURE — 1159F PR MEDICATION LIST DOCUMENTED IN MEDICAL RECORD: ICD-10-PCS | Mod: ,,, | Performed by: INTERNAL MEDICINE

## 2023-11-28 PROCEDURE — 90471 IMMUNIZATION ADMIN: CPT | Mod: PBBFAC | Performed by: INTERNAL MEDICINE

## 2023-11-28 PROCEDURE — 3008F PR BODY MASS INDEX (BMI) DOCUMENTED: ICD-10-PCS | Mod: ,,, | Performed by: INTERNAL MEDICINE

## 2023-11-28 RX ORDER — TADALAFIL 20 MG/1
TABLET ORAL
Qty: 10 TABLET | Refills: 5 | Status: SHIPPED | OUTPATIENT
Start: 2023-11-28

## 2023-11-28 NOTE — PROGRESS NOTES
Subjective:       Patient ID: Evin Mtz is a 58 y.o. male.    Chief Complaint: Follow-up    The patient is a 56-year-old white male the presents today to establish.  We saw him about 6 weeks ago for surgical pre-op risk stratification. He underwent L4-L5 fusion on October 5th. He is recovering well but still has some pain. He has a history of hypertension, GERD, venous insufficiency.  No known history of coronary artery disease, cerebrovascular disease, or CHF, he denies any chest pain at rest or with moderate exertion.  He had a left heart catheterization around 2018 that showed normal coronaries.  He underwent right total knee replacement in 2020 without any complications.  No significant changes in his health since then.  He has chronic bilateral lower extremity edema.  He had a stent to the iliac vein on the right.  This did not improve his symptoms.  He has been seen by vascular surgery and also by vein specialist without much improvement in his symptoms.  Today he is resting comfortably in no distress.  He is afebrile and vital signs are stable.    5/30/23-the patient presents today for follow-up.  Since we last saw him he underwent surgical repair of his lumbar spine at [facility].  Surgery went well and he states that after the surgery for a while he did not have any swelling in his lower extremities.  Then about 6 weeks ago he was doing some work and felt an unusual sensation in his back.  After that he started having swelling again.  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  He continues to follow with dermatology.  His biggest complaint is fatigue.  He is resting comfortably today in no distress.  He is afebrile and vital signs are stable.    11/28/23-Mr. Mtz presents today for follow-up.  He continues to have issues with swelling in bilateral lower extremities.  Now with a change in insurance he is scheduled to be seen by vascular surgeon in [place].  This is  scheduled for January.  He has been told in the past that he needs an extension of the iliac stent on the right.  He is also having issues with his left hip.  He has been told by Orthopedics that he needs a left hip replacement.  Otherwise he is doing okay.  Blood pressure looks good today.  It is 132/76.  His back is doing okay.  We do need to check some lab work today.  He is currently afebrile and vital signs are stable.    Follow-up  Associated symptoms include fatigue. Pertinent negatives include no abdominal pain, arthralgias, chest pain, chills, coughing, fever, headaches, joint swelling, myalgias, nausea, neck pain, rash, sore throat or weakness.   Fatigue  Associated symptoms include fatigue. Pertinent negatives include no abdominal pain, arthralgias, chest pain, chills, coughing, fever, headaches, joint swelling, myalgias, nausea, neck pain, rash, sore throat or weakness.     Review of Systems   Constitutional:  Positive for fatigue. Negative for appetite change, chills and fever.   HENT:  Negative for ear pain, hearing loss, sinus pressure/congestion and sore throat.    Eyes:  Negative for pain, redness and visual disturbance.   Respiratory:  Negative for apnea, cough, shortness of breath and wheezing.    Cardiovascular:  Positive for leg swelling. Negative for chest pain and palpitations.   Gastrointestinal:  Negative for abdominal pain, blood in stool, constipation, diarrhea and nausea.   Endocrine: Negative for cold intolerance, heat intolerance and polyuria.   Genitourinary:  Negative for dysuria and hematuria.   Musculoskeletal:  Negative for arthralgias, back pain, joint swelling, myalgias and neck pain.   Integumentary:  Negative for pallor, rash and mole/lesion.   Allergic/Immunologic: Negative for frequent infections.   Neurological:  Negative for tremors, seizures, weakness and headaches.   Hematological:  Negative for adenopathy.   Psychiatric/Behavioral:  Negative for confusion, dysphoric mood  and sleep disturbance. The patient is not nervous/anxious.          Objective:      Physical Exam  Vitals and nursing note reviewed.   Constitutional:       General: He is not in acute distress.     Appearance: Normal appearance. He is not ill-appearing.   HENT:      Head: Normocephalic and atraumatic.      Right Ear: External ear normal.      Left Ear: External ear normal.      Nose: Nose normal.      Mouth/Throat:      Pharynx: Oropharynx is clear.   Eyes:      Extraocular Movements: Extraocular movements intact.      Conjunctiva/sclera: Conjunctivae normal.      Pupils: Pupils are equal, round, and reactive to light.   Neck:      Vascular: No carotid bruit.   Cardiovascular:      Rate and Rhythm: Normal rate and regular rhythm.      Pulses: Normal pulses.      Heart sounds: Normal heart sounds. No murmur heard.  Pulmonary:      Effort: No respiratory distress.      Breath sounds: Normal breath sounds. No wheezing or rales.   Abdominal:      General: Bowel sounds are normal. There is no distension.      Palpations: Abdomen is soft.   Musculoskeletal:         General: Normal range of motion.      Cervical back: Normal range of motion and neck supple.      Right lower leg: Edema present.      Left lower leg: Edema present.      Comments: Pitting edema bilateral lower extremities   Skin:     General: Skin is warm and dry.      Capillary Refill: Capillary refill takes less than 2 seconds.      Coloration: Skin is not pale.   Neurological:      General: No focal deficit present.      Mental Status: He is alert and oriented to person, place, and time.      Cranial Nerves: No cranial nerve deficit.      Motor: No weakness.   Psychiatric:         Mood and Affect: Mood normal.         Judgment: Judgment normal.         Assessment:       Problem List Items Addressed This Visit          Neuro    Chronic pain syndrome       Cardiac/Vascular    PVD (peripheral vascular disease) (Chronic)    Relevant Orders    Lipid Panel     CBC Auto Differential    Comprehensive Metabolic Panel    Essential hypertension    Relevant Orders    Lipid Panel    CBC Auto Differential    Comprehensive Metabolic Panel       GI    Gastroesophageal reflux disease     Other Visit Diagnoses       Follow-up exam    -  Primary    Relevant Orders    Hemoglobin A1C    Hepatitis C Antibody    Normocytic anemia        Relevant Orders    Iron and TIBC    Ferritin    Vitamin B12 & Folate            Plan:       1. The patient presents today for follow up/wellness visit.  We are going to check some lab work today.  He is had a colonoscopy in the past.  We will see if we can get that documented.  Age-appropriate screenings are up-to-date.  Flu vaccine today    2. Essential hypertension-blood pressure is at goal.  Currently 132/76 he is currently only taking Lasix p.r.n.    3. GERD-continue with PPI daily    4. Erectile dysfunction-he uses Cialis p.r.n.    5. Chronic venous disease-he can not tolerate compression hose.  He uses Lasix p.r.n.  This is his biggest issue currently. He has seen Dr. Reed and Dr. Stern in the past. We are going to refer back to Dr. Stern. He states that he cannot wear lymphatic pumps because of time. He wants to know if there is something else that can be done. For now we will continue with lasix 40 mg and he will take twice a day for the next couple of days. Elevate leg when seated. He has a history of venous stent and was on antiplatelet therapy for about 1 year. This is not needed any longer.  7/13- He was seen by vein clinic in [place] and they believe that he would benefit from and extension of his prior vein stent. He is going to set that up once his wife has healed from recent surgery.  5/30/23-following his back surgery is swelling in his lower extremities improved for a while.  However over the last 6 weeks they have started swelling again.  11/28/23-this is his biggest issue.  He has an appointment now with a vascular surgeon in [facility]  [place].  This is in January 6. Eczema/ prurigo nodularis- He has kenalog ointment. He has seen dermatology  7/13- Still having issues. He occasionally gets some infection in these lesions as well. Currently not using steroid cream. Only using 1% clindamycin gel. I have given refills for both and referred to Chanell Santiago.   11/28/23-he continues to follow with dermatology.  Still has issues at times    7. Fatigue-common complaint.  He has not had thyroid or testosterone checked so we will check that.---both checked out okay    He will follow-up in 6 months or sooner if needed.

## 2024-01-10 ENCOUNTER — OFFICE VISIT (OUTPATIENT)
Dept: DERMATOLOGY | Facility: CLINIC | Age: 59
End: 2024-01-10
Payer: COMMERCIAL

## 2024-01-10 DIAGNOSIS — L28.1 PRURIGO NODULARIS: Primary | ICD-10-CM

## 2024-01-10 PROCEDURE — 1159F MED LIST DOCD IN RCRD: CPT | Mod: ,,, | Performed by: DERMATOLOGY

## 2024-01-10 PROCEDURE — 87070 CULTURE OTHR SPECIMN AEROBIC: CPT | Mod: ,,, | Performed by: CLINICAL MEDICAL LABORATORY

## 2024-01-10 PROCEDURE — 99214 OFFICE O/P EST MOD 30 MIN: CPT | Mod: ,,, | Performed by: DERMATOLOGY

## 2024-01-10 PROCEDURE — 1160F RVW MEDS BY RX/DR IN RCRD: CPT | Mod: ,,, | Performed by: DERMATOLOGY

## 2024-01-10 PROCEDURE — 87186 SC STD MICRODIL/AGAR DIL: CPT | Mod: ,,, | Performed by: CLINICAL MEDICAL LABORATORY

## 2024-01-10 PROCEDURE — 87077 CULTURE AEROBIC IDENTIFY: CPT | Mod: ,,, | Performed by: CLINICAL MEDICAL LABORATORY

## 2024-01-10 NOTE — PROGRESS NOTES
[facility]   Chanell Santiago MD    Patient Name: Evin Mtz  Patient YOB: 1965   Date of Service: 1/10/24    CC: Follow-up Prurigo Nodularis    HPI: Evin Mtz is a 58 y.o. male here today for follow-up of PN, last seen 10/2023.  Previous treatments include  doxy, mupirocin,amitriptyline, and clindamycin gel.  Overall, the PN is improved.  Treatment plan was followed as directed.    Past Medical History:   Diagnosis Date    GERD (gastroesophageal reflux disease)     Hypertension      Past Surgical History:   Procedure Laterality Date    ANTERIOR LUMBAR INTERBODY FUSION (ALIF) N/A 10/5/2021    Procedure: L4-L5 anterior lumbar interbody fusion;  Surgeon: Jayden Mtz MD;  Location: [facility];  Service: Neurosurgery;  Laterality: N/A;    ILIAC ARTERY STENT      JOINT REPLACEMENT Right     tka    LAPAROSCOPIC REPAIR OF HIATAL HERNIA      ROBOTIC FUSION,SPINE,LUMBAR,TLIF N/A 11/10/2022    Procedure: ROBOTIC FUSION,SPINE,LUMBAR,TLIF; [facility] OR 3A, 11/10/22 first case; Procedure: L3-S1 decompression and fusion, L5-S1 TLIF; Instrumentation: Medtronic; Marino table, Equipment: C arm, O arm, Robot; Neuro-Monitoring: Yes (EMG, SSEP, MEP);  Surgeon: Seb Banegas MD;  Location: [facility];  Service: Neurosurgery;  Laterality: N/A;    SINUS SURGERY       Review of patient's allergies indicates:   Allergen Reactions    Aspirin Other (See Comments)     Gi symptoms-burning       Current Outpatient Medications:     albuterol (VENTOLIN HFA) 90 mcg/actuation inhaler, Inhale 2 puffs into the lungs every 6 (six) hours as needed for Wheezing. Rescue, Disp: 18 g, Rfl: 3    amitriptyline (ELAVIL) 25 MG tablet, TAKE ONE (1) TABLET BY MOUTH EACH EVENING, Disp: 30 tablet, Rfl: 5    ascorbic acid, vitamin C, (VITAMIN C) 1000 MG tablet, Vitamin C 1000 MG Oral Tablet QTY: 0 tablet Days: 0 Refills: 0  Written: 01/03/23 Patient Instructions: qd, Disp: , Rfl:     aspirin (ECOTRIN) 81 MG EC tablet,  Aspirin 81 MG Oral Tablet Delayed Release QTY: 0 tablet Days: 0 Refills: 0  Written: 01/03/23 Patient Instructions: qd, Disp: , Rfl:     baclofen (LIORESAL) 10 MG tablet, , Disp: , Rfl:     cephALEXin (KEFLEX) 500 MG capsule, Take 500 mg by mouth 3 (three) times daily., Disp: , Rfl:     clindamycin phosphate 1% (CLINDAGEL) 1 % gel, Apply topically 2 (two) times daily. (Patient not taking: Reported on 11/28/2023), Disp: 60 g, Rfl: 3    clindamycin-benzoyl peroxide gel, Apply topically 2 (two) times daily. (Patient not taking: Reported on 11/28/2023), Disp: 45 g, Rfl: 2    cyclobenzaprine (FLEXERIL) 10 MG tablet, TAKE ONE (1) TABLET  BY MOUTH THREE (3)  TIMES DAILY AS NEEDED FOR MUSCLE SPASMS., Disp: 30 tablet, Rfl: 2    dextroamphetamine-amphetamine (ADDERALL) 20 mg tablet, Take 1 tablet by mouth 2 (two) times a day. , Disp: , Rfl:     DULoxetine (CYMBALTA) 30 MG capsule, Take 30 mg by mouth., Disp: , Rfl:     DUPIXENT SYRINGE 300 mg/2 mL Syrg, , Disp: , Rfl:     ergocalciferol, vitamin D2, (VITAMIN D ORAL), Take 300 mg by mouth., Disp: , Rfl:     esomeprazole (NEXIUM) 40 MG capsule, TAKE ONE (1) CAPSULE BY MOUTH EACH MORNING BEFORE BREAKFAST, Disp: 90 capsule, Rfl: 2    fluticasone propionate (FLONASE) 50 mcg/actuation nasal spray, 1 spray (50 mcg total) by Each Nostril route once daily., Disp: 16 g, Rfl: 3    furosemide (LASIX) 40 MG tablet, Take 1 tablet (40 mg total) by mouth once daily., Disp: 90 tablet, Rfl: 3    HYDROcodone-acetaminophen (NORCO)  mg per tablet, Take 1 tablet by mouth 4 (four) times daily as needed., Disp: , Rfl:     mupirocin (BACTROBAN) 2 % ointment, Apply to sores on arms 2-3 times daily, Disp: 30 g, Rfl: 3    pregabalin (LYRICA) 150 MG capsule, , Disp: , Rfl:     tadalafiL (CIALIS) 20 MG Tab, TAKE ONE (1) TABLET (20 MG TOTAL) BY MOUTH DAILY AS NEEDED., Disp: 10 tablet, Rfl: 5    triamcinolone acetonide 0.1% (KENALOG) 0.1 % ointment, Apply topically 2 (two) times daily. (Patient not  taking: Reported on 11/28/2023), Disp: 80 g, Rfl: 5    vitamin D (VITAMIN D3) 1000 units Tab, Vitamin D (Cholecalciferol) 25 MCG (1000 UT) Oral Tablet QTY: 0 tablet Days: 0 Refills: 0  Written: 01/03/23 Patient Instructions: qd, Disp: , Rfl:     ROS: A focused review of systems was obtained and negative.     Exam: A focused skin exam was performed. All areas examined were normal except as mentioned in the assessment and plan below.  General Appearance of the patient is well developed and well nourished.  Orientation: alert and oriented x 3.  Mood and affect: pleasant.    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    Plan:        Prurigo Nodularis  - erythematous nodules with central erosions located on the arms  Severity: severe  Estimated nodule count: 20  Status: Improved, but not at treatment goal      Plan: Counseling  I counseled the patient regarding the following:  Skin care: Recommend trimming nails short, anti-itch lotions such as Sarna, topical steroids and antihistamines.  Expectations: Prurigo Nodularis is a self-inflicted lesion that results from picking or rubbing the same spot of skin over and over again. If the itch-scratch cycle is broken, the lesions will resolve.  Contact office if: Prurigo Nodularis worsens, or if itching is accompanied by constitutional symptoms.    - Continue Dupixent and amitriptyline  - will FU in 6 weeks to consider adding therapy   - repeat culture to r/o recurrent staph    Skin Infection, NOS   - see above    Plan: Counseling  I counseled the patient regarding the following:  Skin care: Patients with purulence or fluid collections should have their wounds re-opened, drained, cultured and irrigated. All wound infections should be treated with antibiotics.  Expectations: Wound Infections usually occur 4-7 days postoperatively. Patients exhibit, pain, rednes, swelling, cellulitic changes and fever.  Contact Office if: Wound Infection fails to respond to  treatment or worsens, patient develops a fever, or if redness spreads despite antibiotics.    A bacterial culture was obtained from the arms      Stasis Dermatitis (I87.2)  - Eczematous patches with hemosiderin deposition  Status: Inadequately controlled      Plan: Counseling.  I counseled the patient regarding the following:  Skin care: Patient instructed to keep legs elevated, wear compression stockings, and use emollients.  Expectations: Stasis Dermatitis is chronic in nature and may worsen when the lower legs become swollen.  Contact office if: One leg becomes more swollen than the other, patient develops a fever, patient develops an  ulcer, or legs become painful.  I discussed with the patient that prolonged use of topical steroids can result in the increased appearance of  superficial blood vessels (telangiectasias), lightening (hypopigmentation) and thinning of the skin (atrophy).  Patient understands to avoid using high potency steroids in skin folds, the groin or the face. The patient  verbalized understanding of the proper use     - Appointment scheduled with vein specialist 01/12/24 per PCP  - Instructed to use TAC 0.1% BID PRN for flares  - continue daily moisturizer    Follow up in about 6 weeks (around 2/21/2024) for PN.    Chanell Santiago MD

## 2024-01-12 ENCOUNTER — OFFICE VISIT (OUTPATIENT)
Dept: VASCULAR SURGERY | Facility: CLINIC | Age: 59
End: 2024-01-12
Payer: COMMERCIAL

## 2024-01-12 VITALS
HEART RATE: 66 BPM | WEIGHT: 226 LBS | SYSTOLIC BLOOD PRESSURE: 126 MMHG | BODY MASS INDEX: 33.47 KG/M2 | HEIGHT: 69 IN | DIASTOLIC BLOOD PRESSURE: 66 MMHG

## 2024-01-12 DIAGNOSIS — B95.62 INFECTION OF SKIN DUE TO METHICILLIN RESISTANT STAPHYLOCOCCUS AUREUS (MRSA): Primary | ICD-10-CM

## 2024-01-12 DIAGNOSIS — I87.2 VENOUS INSUFFICIENCY: Primary | ICD-10-CM

## 2024-01-12 DIAGNOSIS — I89.0 LYMPHEDEMA: ICD-10-CM

## 2024-01-12 DIAGNOSIS — M79.606 PAIN AND SWELLING OF LOWER EXTREMITY, UNSPECIFIED LATERALITY: ICD-10-CM

## 2024-01-12 DIAGNOSIS — L08.9 INFECTION OF SKIN DUE TO METHICILLIN RESISTANT STAPHYLOCOCCUS AUREUS (MRSA): Primary | ICD-10-CM

## 2024-01-12 DIAGNOSIS — M79.89 PAIN AND SWELLING OF LOWER EXTREMITY, UNSPECIFIED LATERALITY: ICD-10-CM

## 2024-01-12 LAB — MICROORGANISM SPEC CULT: ABNORMAL

## 2024-01-12 PROCEDURE — 3008F BODY MASS INDEX DOCD: CPT | Mod: CPTII,S$GLB,, | Performed by: SURGERY

## 2024-01-12 PROCEDURE — 3078F DIAST BP <80 MM HG: CPT | Mod: CPTII,S$GLB,, | Performed by: SURGERY

## 2024-01-12 PROCEDURE — 1159F MED LIST DOCD IN RCRD: CPT | Mod: CPTII,S$GLB,, | Performed by: SURGERY

## 2024-01-12 PROCEDURE — 99214 OFFICE O/P EST MOD 30 MIN: CPT | Mod: S$GLB,,, | Performed by: SURGERY

## 2024-01-12 PROCEDURE — 3074F SYST BP LT 130 MM HG: CPT | Mod: CPTII,S$GLB,, | Performed by: SURGERY

## 2024-01-12 RX ORDER — LINEZOLID 600 MG/1
600 TABLET, FILM COATED ORAL 2 TIMES DAILY
Qty: 14 TABLET | Refills: 0 | Status: SHIPPED | OUTPATIENT
Start: 2024-01-12 | End: 2024-01-19

## 2024-01-12 NOTE — PROGRESS NOTES
Filippo Duarte MD, RPVI                                 [facility]                           [facility]                             01/12/2024    HPI:  Evin Mtz is a 58 y.o. male with   Patient Active Problem List   Diagnosis    Arthritis of left hip    Arthritis of right hip    Gastroesophageal reflux disease    Essential hypertension    Venous insufficiency    Spondylolisthesis, lumbar region    S/P lumbar fusion    PVD (peripheral vascular disease)    Chronic pain syndrome    Impingement syndrome of right shoulder    being managed by PCP and specialists who is here today for evaluation of BLE edema.  Patient states location is BLE occurring for yrs.  Associated signs and symptoms include pain.  Quality is aching and severity is 5/10.  Symptoms began yrs ago.  Alleviating factors include elevation.  Worsening factors include dependency.  Patient has been wearing compression stockings for greater than 3 months.  +FH of venous disease.  Symptoms do limit patient's functional status and daily activities.  no DVT history.  + R iliac vein stent in [place]; past venous interventions.  no low sodium diet.  no excessive water intake.    Pain with ambulating 100 ft.  Improves throughout the day.    Migraine with aura: no  PFO/ASD/right to left shunt: no  Pregnant: no  Breastfeeding: no    no MI  no Stroke  Tobacco use: denies    1/2024:  c/o BLE edema and pain.      Past Medical History:   Diagnosis Date    GERD (gastroesophageal reflux disease)     Hypertension      Past Surgical History:   Procedure Laterality Date    ANTERIOR LUMBAR INTERBODY FUSION (ALIF) N/A 10/5/2021    Procedure: L4-L5 anterior lumbar interbody fusion;  Surgeon: Jayden Mtz MD;  Location: [facility];  Service: Neurosurgery;  Laterality: N/A;    ILIAC ARTERY STENT      JOINT REPLACEMENT Right     tka    LAPAROSCOPIC REPAIR OF HIATAL HERNIA      ROBOTIC FUSION,SPINE,LUMBAR,TLIF N/A 11/10/2022    Procedure:  ROBOTIC FUSION,SPINE,LUMBAR,TLIF; [facility] OR 3A, 11/10/22 first case; Procedure: L3-S1 decompression and fusion, L5-S1 TLIF; Instrumentation: Medtronic; Marino table, Equipment: C arm, O arm, Robot; Neuro-Monitoring: Yes (EMG, SSEP, MEP);  Surgeon: Seb Banegas MD;  Location: [facility];  Service: Neurosurgery;  Laterality: N/A;    SINUS SURGERY       Family History   Problem Relation Age of Onset    No Known Problems Mother     Hypertension Father     Diabetes Sister     No Known Problems Brother     No Known Problems Maternal Aunt     No Known Problems Maternal Uncle     No Known Problems Paternal Aunt     No Known Problems Paternal Uncle     No Known Problems Maternal Grandmother     No Known Problems Maternal Grandfather     No Known Problems Paternal Grandmother     No Known Problems Paternal Grandfather      Social History     Socioeconomic History    Marital status:    Tobacco Use    Smoking status: Never    Smokeless tobacco: Never   Substance and Sexual Activity    Alcohol use: Never    Drug use: Never    Sexual activity: Not Currently     Social Determinants of Health     Financial Resource Strain: Low Risk  (11/11/2022)    Overall Financial Resource Strain (CARDIA)     Difficulty of Paying Living Expenses: Not hard at all   Food Insecurity: No Food Insecurity (11/11/2022)    Hunger Vital Sign     Worried About Running Out of Food in the Last Year: Never true     Ran Out of Food in the Last Year: Never true   Transportation Needs: No Transportation Needs (11/11/2022)    PRAPARE - Transportation     Lack of Transportation (Medical): No     Lack of Transportation (Non-Medical): No   Physical Activity: Insufficiently Active (11/11/2022)    Exercise Vital Sign     Days of Exercise per Week: 3 days     Minutes of Exercise per Session: 30 min   Stress: No Stress Concern Present (11/11/2022)    [employer] - Occupational Stress Questionnaire     Feeling of Stress : Not at  all   Social Connections: Moderately Integrated (11/11/2022)    Social Connection and Isolation Panel [NHANES]     Frequency of Communication with Friends and Family: Twice a week     Frequency of Social Gatherings with Friends and Family: Twice a week     Attends [employer] Services: 1 to 4 times per year     Active Member of Clubs or Organizations: No     Attends Club or Organization Meetings: Never     Marital Status:    Housing Stability: Low Risk  (11/11/2022)    Housing Stability Vital Sign     Unable to Pay for Housing in the Last Year: No     Number of Places Lived in the Last Year: 1     Unstable Housing in the Last Year: No       Current Outpatient Medications:     albuterol (VENTOLIN HFA) 90 mcg/actuation inhaler, Inhale 2 puffs into the lungs every 6 (six) hours as needed for Wheezing. Rescue, Disp: 18 g, Rfl: 3    amitriptyline (ELAVIL) 25 MG tablet, TAKE ONE (1) TABLET BY MOUTH EACH EVENING, Disp: 30 tablet, Rfl: 5    ascorbic acid, vitamin C, (VITAMIN C) 1000 MG tablet, Vitamin C 1000 MG Oral Tablet QTY: 0 tablet Days: 0 Refills: 0  Written: 01/03/23 Patient Instructions: qd, Disp: , Rfl:     aspirin (ECOTRIN) 81 MG EC tablet, Aspirin 81 MG Oral Tablet Delayed Release QTY: 0 tablet Days: 0 Refills: 0  Written: 01/03/23 Patient Instructions: qd, Disp: , Rfl:     baclofen (LIORESAL) 10 MG tablet, , Disp: , Rfl:     cephALEXin (KEFLEX) 500 MG capsule, Take 500 mg by mouth 3 (three) times daily., Disp: , Rfl:     clindamycin phosphate 1% (CLINDAGEL) 1 % gel, Apply topically 2 (two) times daily., Disp: 60 g, Rfl: 3    cyclobenzaprine (FLEXERIL) 10 MG tablet, TAKE ONE (1) TABLET  BY MOUTH THREE (3)  TIMES DAILY AS NEEDED FOR MUSCLE SPASMS., Disp: 30 tablet, Rfl: 2    dextroamphetamine-amphetamine (ADDERALL) 20 mg tablet, Take 1 tablet by mouth 2 (two) times a day. , Disp: , Rfl:     DULoxetine (CYMBALTA) 30 MG capsule, Take 30 mg by mouth., Disp: , Rfl:     DUPIXENT SYRINGE 300 mg/2 mL Syrg, , Disp: ,  Rfl:     ergocalciferol, vitamin D2, (VITAMIN D ORAL), Take 300 mg by mouth., Disp: , Rfl:     esomeprazole (NEXIUM) 40 MG capsule, TAKE ONE (1) CAPSULE BY MOUTH EACH MORNING BEFORE BREAKFAST, Disp: 90 capsule, Rfl: 2    fluticasone propionate (FLONASE) 50 mcg/actuation nasal spray, 1 spray (50 mcg total) by Each Nostril route once daily., Disp: 16 g, Rfl: 3    furosemide (LASIX) 40 MG tablet, Take 1 tablet (40 mg total) by mouth once daily., Disp: 90 tablet, Rfl: 3    HYDROcodone-acetaminophen (NORCO)  mg per tablet, Take 1 tablet by mouth 4 (four) times daily as needed., Disp: , Rfl:     linezolid (ZYVOX) 600 mg Tab, Take 1 tablet (600 mg total) by mouth 2 (two) times daily. for 7 days, Disp: 14 tablet, Rfl: 0    mupirocin (BACTROBAN) 2 % ointment, Apply to sores on arms 2-3 times daily, Disp: 30 g, Rfl: 3    pregabalin (LYRICA) 150 MG capsule, , Disp: , Rfl:     tadalafiL (CIALIS) 20 MG Tab, TAKE ONE (1) TABLET (20 MG TOTAL) BY MOUTH DAILY AS NEEDED., Disp: 10 tablet, Rfl: 5    triamcinolone acetonide 0.1% (KENALOG) 0.1 % ointment, Apply topically 2 (two) times daily., Disp: 80 g, Rfl: 5    vitamin D (VITAMIN D3) 1000 units Tab, Vitamin D (Cholecalciferol) 25 MCG (1000 UT) Oral Tablet QTY: 0 tablet Days: 0 Refills: 0  Written: 01/03/23 Patient Instructions: qd, Disp: , Rfl:     clindamycin-benzoyl peroxide gel, Apply topically 2 (two) times daily. (Patient not taking: Reported on 11/28/2023), Disp: 45 g, Rfl: 2    REVIEW OF SYSTEMS:  General: No fevers or chills; ENT: No sore throat; Allergy and Immunology: no persistent infections; Hematological and Lymphatic: No history of bleeding or easy bruising; Endocrine: negative; Respiratory: no cough, shortness of breath, or wheezing; Cardiovascular: no chest pain or dyspnea on exertion; Gastrointestinal: no abdominal pain/back, change in bowel habits, or bloody stools; Genito-Urinary: no dysuria, trouble voiding, or hematuria; Musculoskeletal: edema;  "Neurological: no TIA or stroke symptoms; Psychiatric: no nervousness, anxiety or depression.    PHYSICAL EXAM:      Pulse: 66         General appearance:  Alert, well-appearing, and in no distress.  Oriented to person, place, and time                    Neurological: Normal speech, no focal findings noted; CN II - XII grossly intact. RLE with sensation to light touch, LLE with sensation to light touch.            Musculoskeletal: Digits/nail without cyanosis/clubbing.  Strength 5/5 BLE.                    Neck: Supple, no significant adenopathy                  Chest:  No wheezes, symmetric air entry. No use of accessory muscles               Cardiac: Normal rate and regular rhythm            Abdomen: Soft, nontender, nondistended      Extremities:   2+ R DP pulse, 2+ L DP pulse      2+ RLE edema, 2+ LLE edema    Skin:  RLE no ulcer; LLE no ulcer      RLE no spider veins, LLE no spider veins      RLE no varicose veins, LLE no varicose veins    CEAP 3/3    VCSS 8    LAB RESULTS:  No results found for: "CBC"  Lab Results   Component Value Date    LABPROT 10.5 11/09/2022    INR 0.90 11/09/2022     Lab Results   Component Value Date     11/14/2022    K 3.8 11/14/2022     11/14/2022    CO2 27 11/14/2022     11/14/2022    BUN 13 11/14/2022    CREATININE 0.7 11/14/2022    CALCIUM 9.1 11/14/2022    ANIONGAP 8 11/14/2022    EGFRNONAA 104 04/21/2022     Lab Results   Component Value Date    WBC 6.04 11/14/2022    RBC 3.38 (L) 11/14/2022    HGB 10.8 (L) 11/14/2022    HCT 30.7 (L) 11/14/2022    MCV 91 11/14/2022    MCH 32.0 (H) 11/14/2022    MCHC 35.2 (H) 11/14/2022    RDW 12.6 11/14/2022     11/14/2022    MPV 9.8 11/14/2022    GRAN 9.6 (H) 11/11/2022    GRAN 82.0 (H) 11/11/2022    LYMPH 1.2 11/11/2022    LYMPH 10.1 (L) 11/11/2022    MONO 0.9 11/11/2022    MONO 7.8 11/11/2022    EOS 0.0 11/11/2022    BASO 0.01 11/11/2022    EOSINOPHIL 0.0 11/11/2022    BASOPHIL 0.1 11/11/2022    DIFFMETHOD Automated " "11/11/2022     .No results found for: "HGBA1C"    IMAGING:  All pertinent imaging has been reviewed and interpreted independently.    Venous US 10/2023 Impression:  FINDINGS:  Deep venous system:     There is evidence of flow, compressibility and augmentation within bilateral common femoral, femoral, and popliteal veins.  Flow is seen within bilateral peroneal, posterior tibial and anterior tibial veins.     Superficial venous system:     Right leg:     Reflux measuring 1788 milliseconds noted at the greater saphenous vein @ below the knee.  No significant reflux in the lesser saphenous vein.     The maximal diameter within the right greater saphenous vein is 0.58 cm.     The maximal diameter within the right lesser saphenous vein is 0.42cm.     Left leg:     Reflux measuring 3677 milliseconds at the greater saphenous vein @ mid calf.  No significant reflux in the lesser saphenous vein.     The maximal diameter within the left greater saphenous vein is 0.59 cm.     The maximal diameter within the left lesser saphenous vein is 0.35cm.     Impression:     Hemodynamically significant venous reflux (>500 ms) in the right and left greater saphenous veins, as above.     No evidence of DVT in the lower extremities.        Electronically signed by: Duy Michaud MD  Date:                                            10/20/2023    11/2023  IVC and visualized pelvic and upper thigh veins are widely patent.     Metallic hardware from spine surgery limits evaluation of the superior most aspect of the left common iliac vein    Impression:     No pelvic deep venous thrombosis.     Iliac veins are widely patent     Right external iliac stent is widely patent          IMP/PLAN:  58 y.o. male with   Patient Active Problem List   Diagnosis    Arthritis of left hip    Arthritis of right hip    Gastroesophageal reflux disease    Essential hypertension    Venous insufficiency    Spondylolisthesis, lumbar region    S/P lumbar fusion    PVD " (peripheral vascular disease)    Chronic pain syndrome    Impingement syndrome of right shoulder    being managed by PCP and specialists who is here today for evaluation of BLE edema, pain.    -CTV and venous US to eval iliac veins and stent show patent stent with normal flow; cont ASA  -Patient is diagnosed with lymphedema and presents with hyperpigmentation of the lower extremity.  Patient has previously attempted compression, elevation, and exercise for at least 4 weeks.  Patient has secondary lymphedema and has tried and failed compression of 20-30 mm Hg, elevation and exercise for >1 month period however symptoms persist.  Basic pump trial performed and discontinued due to sensitivity and pain to static pressure.  Symptoms of swelling, pain and hyperplasia present.  A compression device is recommended to treat current symptoms and prevent disease progression. The patient has tried elevation, exercise and compression and symptoms remain.  The patient has marked hyperkeratosis with hyperplasia and hyperpigmentation.  - recommend lymphedema clinic therapy and pumps  -recommend compression with Rx stockings, elevation, dietary changes associated with water and sodium intake discussed at length with patient  -Exercise   -PCP eval of R groin pain - does not appear to be vascular in etiology  -RTC 6 months for further evaluation    I spent 15 minutes evaluating this patient and greater than 50% of the time was spent counseling, coordinator care and discussing the plan of care.  All questions were answered and patient stated understanding with agreement with the above treatment plan.    Filippo Duarte MD [facility]  Vascular and Endovascular Surgery

## 2024-01-18 ENCOUNTER — TELEPHONE (OUTPATIENT)
Dept: VASCULAR SURGERY | Facility: CLINIC | Age: 59
End: 2024-01-18
Payer: COMMERCIAL

## 2024-01-18 DIAGNOSIS — I87.2 VENOUS INSUFFICIENCY: ICD-10-CM

## 2024-01-18 DIAGNOSIS — I89.0 LYMPHEDEMA: Primary | ICD-10-CM

## 2024-01-22 ENCOUNTER — TELEPHONE (OUTPATIENT)
Dept: VASCULAR SURGERY | Facility: CLINIC | Age: 59
End: 2024-01-22
Payer: COMMERCIAL

## 2024-01-22 ENCOUNTER — TELEPHONE (OUTPATIENT)
Dept: INTERNAL MEDICINE | Facility: CLINIC | Age: 59
End: 2024-01-22
Payer: COMMERCIAL

## 2024-01-22 ENCOUNTER — PATIENT MESSAGE (OUTPATIENT)
Dept: INTERNAL MEDICINE | Facility: CLINIC | Age: 59
End: 2024-01-22
Payer: COMMERCIAL

## 2024-01-22 NOTE — TELEPHONE ENCOUNTER
Spoke to pt spouse and she states she has been able to get pts appt at [place] moved up to 01/24/24

## 2024-01-22 NOTE — TELEPHONE ENCOUNTER
"  Spoke w/pt wife and informed that referral was sent on 1/18 to [facility] for lymphedema therapy. Wife stated she will contact them today to see what the hold up may be regarding an appointment. Stated that patient's toes are becoming ischemic however he does still have feeling in them. Informed I would send a message to Dr. Duarte for further advice. Pt wife verb understanding.         ----- Message from Sandy Bolden sent at 1/22/2024 10:40 AM CST -----  Regarding: FW: Marla "wife" [phone] or [phone]    ----- Message -----  From: Mary Decker  Sent: 1/22/2024  10:38 AM CST  To: [facility]ley Staff  Subject: Marla "wife" [phone] or [phone]        .Type: Patient Call Back    Who called: Marla "wife"    What is the request in detail: Three of the pt's toes is starting to turn dark like it's bruised really bad. Pt is still waiting to get info on being referred to [facility] in [place]      Can the clinic reply by [facility]? Call eric    Would the patient rather a call back or a response via [facility]? Call back    Best call back number: .[phone] or [phone]          "

## 2024-01-22 NOTE — TELEPHONE ENCOUNTER
----- Message from Andie Wells LPN sent at 1/22/2024 10:45 AM CST -----  Needing referral to lymphema management at [facility] [phone]. Due to discoloration of toe. Moving into other toes. Dr gomez said he needs to go but they can not get an update.      [phone]

## 2024-01-29 ENCOUNTER — TELEPHONE (OUTPATIENT)
Dept: VASCULAR SURGERY | Facility: CLINIC | Age: 59
End: 2024-01-29
Payer: COMMERCIAL

## 2024-01-29 NOTE — TELEPHONE ENCOUNTER
Called pt to check on discoloration of toes and offer visit with Dr. Duarte. Pt states that the color is back to normal in his toes and the lymphedema doctor does not think it is vascular. Pt will continue with lymphedema therapy and contact us if he needs anything.

## 2024-02-19 ENCOUNTER — TELEPHONE (OUTPATIENT)
Dept: VASCULAR SURGERY | Facility: CLINIC | Age: 59
End: 2024-02-19
Payer: COMMERCIAL

## 2024-02-21 ENCOUNTER — OFFICE VISIT (OUTPATIENT)
Dept: DERMATOLOGY | Facility: CLINIC | Age: 59
End: 2024-02-21
Payer: COMMERCIAL

## 2024-02-21 VITALS — HEIGHT: 69 IN | BODY MASS INDEX: 33.33 KG/M2 | WEIGHT: 225 LBS | RESPIRATION RATE: 18 BRPM

## 2024-02-21 DIAGNOSIS — L28.1 PRURIGO NODULARIS: Primary | ICD-10-CM

## 2024-02-21 DIAGNOSIS — L08.9 SKIN INFECTION: ICD-10-CM

## 2024-02-21 PROCEDURE — 87070 CULTURE OTHR SPECIMN AEROBIC: CPT | Mod: ,,, | Performed by: CLINICAL MEDICAL LABORATORY

## 2024-02-21 PROCEDURE — 1160F RVW MEDS BY RX/DR IN RCRD: CPT | Mod: ,,, | Performed by: DERMATOLOGY

## 2024-02-21 PROCEDURE — 1159F MED LIST DOCD IN RCRD: CPT | Mod: ,,, | Performed by: DERMATOLOGY

## 2024-02-21 PROCEDURE — 99214 OFFICE O/P EST MOD 30 MIN: CPT | Mod: ,,, | Performed by: DERMATOLOGY

## 2024-02-21 PROCEDURE — 3008F BODY MASS INDEX DOCD: CPT | Mod: ,,, | Performed by: DERMATOLOGY

## 2024-02-21 PROCEDURE — 87186 SC STD MICRODIL/AGAR DIL: CPT | Mod: ,,, | Performed by: CLINICAL MEDICAL LABORATORY

## 2024-02-21 PROCEDURE — 87077 CULTURE AEROBIC IDENTIFY: CPT | Mod: ,,, | Performed by: CLINICAL MEDICAL LABORATORY

## 2024-02-21 RX ORDER — AMITRIPTYLINE HYDROCHLORIDE 25 MG/1
TABLET, FILM COATED ORAL
Qty: 30 TABLET | Refills: 5 | OUTPATIENT
Start: 2024-02-21

## 2024-02-21 RX ORDER — MUPIROCIN 20 MG/G
OINTMENT TOPICAL
Qty: 30 G | Refills: 2 | Status: SHIPPED | OUTPATIENT
Start: 2024-02-21

## 2024-02-21 NOTE — PROGRESS NOTES
[facility]   Chanell Santiago MD    Patient Name: Evin Mtz  Patient YOB: 1965   Date of Service: 2/21/24    CC: Follow-up Prurigo Nodularis    HPI: Evin Mtz is a 58 y.o. male here today for follow-up of PN, last seen 1/10/2024.  Previous treatments include Dupixent, Amitriptyline, and Linezoild.  Overall, the PN is improved.  Treatment plan was followed as directed.    Past Medical History:   Diagnosis Date    GERD (gastroesophageal reflux disease)     Hypertension      Past Surgical History:   Procedure Laterality Date    ANTERIOR LUMBAR INTERBODY FUSION (ALIF) N/A 10/5/2021    Procedure: L4-L5 anterior lumbar interbody fusion;  Surgeon: Jayden Mtz MD;  Location: [facility];  Service: Neurosurgery;  Laterality: N/A;    ILIAC ARTERY STENT      JOINT REPLACEMENT Right     tka    LAPAROSCOPIC REPAIR OF HIATAL HERNIA      ROBOTIC FUSION,SPINE,LUMBAR,TLIF N/A 11/10/2022    Procedure: ROBOTIC FUSION,SPINE,LUMBAR,TLIF; [facility] OR 3A, 11/10/22 first case; Procedure: L3-S1 decompression and fusion, L5-S1 TLIF; Instrumentation: Medtronic; Marino table, Equipment: C arm, O arm, Robot; Neuro-Monitoring: Yes (EMG, SSEP, MEP);  Surgeon: Seb Banegas MD;  Location: [facility];  Service: Neurosurgery;  Laterality: N/A;    SINUS SURGERY       Review of patient's allergies indicates:   Allergen Reactions    Aspirin Other (See Comments)     Gi symptoms-burning       Current Outpatient Medications:     albuterol (VENTOLIN HFA) 90 mcg/actuation inhaler, Inhale 2 puffs into the lungs every 6 (six) hours as needed for Wheezing. Rescue, Disp: 18 g, Rfl: 3    amitriptyline (ELAVIL) 25 MG tablet, TAKE ONE (1) TABLET BY MOUTH EACH EVENING, Disp: 30 tablet, Rfl: 5    ascorbic acid, vitamin C, (VITAMIN C) 1000 MG tablet, Vitamin C 1000 MG Oral Tablet QTY: 0 tablet Days: 0 Refills: 0  Written: 01/03/23 Patient Instructions: qd, Disp: , Rfl:     aspirin (ECOTRIN) 81 MG EC tablet, Aspirin 81 MG  Oral Tablet Delayed Release QTY: 0 tablet Days: 0 Refills: 0  Written: 01/03/23 Patient Instructions: qd, Disp: , Rfl:     baclofen (LIORESAL) 10 MG tablet, , Disp: , Rfl:     cephALEXin (KEFLEX) 500 MG capsule, Take 500 mg by mouth 3 (three) times daily., Disp: , Rfl:     clindamycin phosphate 1% (CLINDAGEL) 1 % gel, Apply topically 2 (two) times daily., Disp: 60 g, Rfl: 3    clindamycin-benzoyl peroxide gel, Apply topically 2 (two) times daily. (Patient not taking: Reported on 11/28/2023), Disp: 45 g, Rfl: 2    cyclobenzaprine (FLEXERIL) 10 MG tablet, TAKE ONE (1) TABLET  BY MOUTH THREE (3)  TIMES DAILY AS NEEDED FOR MUSCLE SPASMS., Disp: 30 tablet, Rfl: 2    dextroamphetamine-amphetamine (ADDERALL) 20 mg tablet, Take 1 tablet by mouth 2 (two) times a day. , Disp: , Rfl:     DULoxetine (CYMBALTA) 30 MG capsule, Take 30 mg by mouth., Disp: , Rfl:     DUPIXENT SYRINGE 300 mg/2 mL Syrg, , Disp: , Rfl:     ergocalciferol, vitamin D2, (VITAMIN D ORAL), Take 300 mg by mouth., Disp: , Rfl:     esomeprazole (NEXIUM) 40 MG capsule, TAKE ONE (1) CAPSULE BY MOUTH EACH MORNING BEFORE BREAKFAST, Disp: 90 capsule, Rfl: 2    fluticasone propionate (FLONASE) 50 mcg/actuation nasal spray, 1 spray (50 mcg total) by Each Nostril route once daily., Disp: 16 g, Rfl: 3    furosemide (LASIX) 40 MG tablet, Take 1 tablet (40 mg total) by mouth once daily., Disp: 90 tablet, Rfl: 3    HYDROcodone-acetaminophen (NORCO)  mg per tablet, Take 1 tablet by mouth 4 (four) times daily as needed., Disp: , Rfl:     mupirocin (BACTROBAN) 2 % ointment, Apply to sores on arms 2-3 times daily, Disp: 30 g, Rfl: 3    mupirocin (BACTROBAN) 2 % ointment, Apply to sore spots on arm TID, Disp: 30 g, Rfl: 2    pregabalin (LYRICA) 150 MG capsule, , Disp: , Rfl:     tadalafiL (CIALIS) 20 MG Tab, TAKE ONE (1) TABLET (20 MG TOTAL) BY MOUTH DAILY AS NEEDED., Disp: 10 tablet, Rfl: 5    triamcinolone acetonide 0.1% (KENALOG) 0.1 % ointment, Apply topically 2  (two) times daily., Disp: 80 g, Rfl: 5    vitamin D (VITAMIN D3) 1000 units Tab, Vitamin D (Cholecalciferol) 25 MCG (1000 UT) Oral Tablet QTY: 0 tablet Days: 0 Refills: 0  Written: 01/03/23 Patient Instructions: qd, Disp: , Rfl:     ROS: A focused review of systems was obtained and negative.     Exam: A focused skin exam was performed. All areas examined were normal except as mentioned in the assessment and plan below.  General Appearance of the patient is well developed and well nourished.  Orientation: alert and oriented x 3.  Mood and affect: pleasant.    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 A diagnosis of Skin infection was also pertinent to this visit.    Plan:   Medications Ordered This Encounter   Medications    mupirocin (BACTROBAN) 2 % ointment     Sig: Apply to sore spots on arm TID     Dispense:  30 g     Refill:  2     Prurigo Nodularis  - erythematous nodules with central erosions located on the bilateral arms  Severity: severe   Estimated nodule count: 20  Status: improved but not at treatment goal    Plan: Counseling  I counseled the patient regarding the following:  Skin care: Recommend trimming nails short, anti-itch lotions such as Sarna, topical steroids and antihistamines.  Expectations: Prurigo Nodularis is a self-inflicted lesion that results from picking or rubbing the same spot of skin over and over again. If the itch-scratch cycle is broken, the lesions will resolve.  Contact office if: Prurigo Nodularis worsens, or if itching is accompanied by constitutional symptoms.  - Continue Dupixent and Amitriptyline   - will continue to monitor for infection, significant improvement with treatment of MRSA    Skin Infection, NOS   - see above     Plan: Counseling  I counseled the patient regarding the following:  Skin care: Patients with purulence or fluid collections should have their wounds re-opened, drained, cultured and irrigated. All wound infections should be  treated with antibiotics.  Expectations: Wound Infections usually occur 4-7 days postoperatively. Patients exhibit, pain, rednes, swelling, cellulitic changes and fever.  Contact Office if: Wound Infection fails to respond to treatment or worsens, patient develops a fever, or if redness spreads despite antibiotics.    A bacterial culture was obtained from the right arm  - Will send in Mupirocin  - Instructed patient to wrap arms after applying mupirocin   - if culture positive, will coordinate surveillance cultures until clear    Follow up in about 5 months (around 7/21/2024) for PN .    Chanell Santiago MD

## 2024-02-23 DIAGNOSIS — L08.9 INFECTION OF SKIN DUE TO METHICILLIN RESISTANT STAPHYLOCOCCUS AUREUS (MRSA): Primary | ICD-10-CM

## 2024-02-23 DIAGNOSIS — B95.62 INFECTION OF SKIN DUE TO METHICILLIN RESISTANT STAPHYLOCOCCUS AUREUS (MRSA): Primary | ICD-10-CM

## 2024-02-23 LAB — MICROORGANISM SPEC CULT: ABNORMAL

## 2024-02-26 RX ORDER — LINEZOLID 600 MG/1
600 TABLET, FILM COATED ORAL 2 TIMES DAILY
Qty: 28 TABLET | Refills: 0 | Status: SHIPPED | OUTPATIENT
Start: 2024-02-26 | End: 2024-03-11

## 2024-03-08 ENCOUNTER — CLINICAL SUPPORT (OUTPATIENT)
Dept: DERMATOLOGY | Facility: CLINIC | Age: 59
End: 2024-03-08
Payer: COMMERCIAL

## 2024-03-08 DIAGNOSIS — L08.9 SKIN INFECTION: Primary | ICD-10-CM

## 2024-03-08 PROCEDURE — 87070 CULTURE OTHR SPECIMN AEROBIC: CPT | Mod: ,,, | Performed by: CLINICAL MEDICAL LABORATORY

## 2024-03-08 NOTE — PROGRESS NOTES
[facility]   Chanell Santiago MD    Patient Name: Evin Mtz  Patient YOB: 1965   Date of Service: 3/8/24    CC: Follow-up Staph skin infection    HPI: Evin Mtz is a 58 y.o. male here today for follow-up of Staph skin infection, last seen 02/21/2024.  Previous treatments include linezolid .  Overall, the Staph skin infection is improved.  Treatment plan was followed as directed.    Past Medical History:   Diagnosis Date    GERD (gastroesophageal reflux disease)     Hypertension      Past Surgical History:   Procedure Laterality Date    ANTERIOR LUMBAR INTERBODY FUSION (ALIF) N/A 10/5/2021    Procedure: L4-L5 anterior lumbar interbody fusion;  Surgeon: Jayden Mtz MD;  Location: [facility];  Service: Neurosurgery;  Laterality: N/A;    ILIAC ARTERY STENT      JOINT REPLACEMENT Right     tka    LAPAROSCOPIC REPAIR OF HIATAL HERNIA      ROBOTIC FUSION,SPINE,LUMBAR,TLIF N/A 11/10/2022    Procedure: ROBOTIC FUSION,SPINE,LUMBAR,TLIF; [facility] OR 3A, 11/10/22 first case; Procedure: L3-S1 decompression and fusion, L5-S1 TLIF; Instrumentation: Medtronic; Marino table, Equipment: C arm, O arm, Robot; Neuro-Monitoring: Yes (EMG, SSEP, MEP);  Surgeon: Seb Banegas MD;  Location: [facility];  Service: Neurosurgery;  Laterality: N/A;    SINUS SURGERY       Review of patient's allergies indicates:   Allergen Reactions    Aspirin Other (See Comments)     Gi symptoms-burning       Current Outpatient Medications:     albuterol (VENTOLIN HFA) 90 mcg/actuation inhaler, Inhale 2 puffs into the lungs every 6 (six) hours as needed for Wheezing. Rescue, Disp: 18 g, Rfl: 3    amitriptyline (ELAVIL) 25 MG tablet, TAKE ONE (1) TABLET BY MOUTH EACH EVENING, Disp: 30 tablet, Rfl: 5    ascorbic acid, vitamin C, (VITAMIN C) 1000 MG tablet, Vitamin C 1000 MG Oral Tablet QTY: 0 tablet Days: 0 Refills: 0  Written: 01/03/23 Patient Instructions: qd, Disp: , Rfl:     aspirin (ECOTRIN) 81 MG EC tablet,  Aspirin 81 MG Oral Tablet Delayed Release QTY: 0 tablet Days: 0 Refills: 0  Written: 01/03/23 Patient Instructions: qd, Disp: , Rfl:     baclofen (LIORESAL) 10 MG tablet, , Disp: , Rfl:     cephALEXin (KEFLEX) 500 MG capsule, Take 500 mg by mouth 3 (three) times daily., Disp: , Rfl:     clindamycin phosphate 1% (CLINDAGEL) 1 % gel, Apply topically 2 (two) times daily., Disp: 60 g, Rfl: 3    clindamycin-benzoyl peroxide gel, Apply topically 2 (two) times daily. (Patient not taking: Reported on 11/28/2023), Disp: 45 g, Rfl: 2    cyclobenzaprine (FLEXERIL) 10 MG tablet, TAKE ONE (1) TABLET  BY MOUTH THREE (3)  TIMES DAILY AS NEEDED FOR MUSCLE SPASMS., Disp: 30 tablet, Rfl: 2    dextroamphetamine-amphetamine (ADDERALL) 20 mg tablet, Take 1 tablet by mouth 2 (two) times a day. , Disp: , Rfl:     DULoxetine (CYMBALTA) 30 MG capsule, Take 30 mg by mouth., Disp: , Rfl:     DUPIXENT SYRINGE 300 mg/2 mL Syrg, , Disp: , Rfl:     ergocalciferol, vitamin D2, (VITAMIN D ORAL), Take 300 mg by mouth., Disp: , Rfl:     esomeprazole (NEXIUM) 40 MG capsule, TAKE ONE (1) CAPSULE BY MOUTH EACH MORNING BEFORE BREAKFAST, Disp: 90 capsule, Rfl: 2    fluticasone propionate (FLONASE) 50 mcg/actuation nasal spray, 1 spray (50 mcg total) by Each Nostril route once daily., Disp: 16 g, Rfl: 3    furosemide (LASIX) 40 MG tablet, Take 1 tablet (40 mg total) by mouth once daily., Disp: 90 tablet, Rfl: 3    HYDROcodone-acetaminophen (NORCO)  mg per tablet, Take 1 tablet by mouth 4 (four) times daily as needed., Disp: , Rfl:     linezolid (ZYVOX) 600 mg Tab, Take 1 tablet (600 mg total) by mouth 2 (two) times daily. for 14 days, Disp: 28 tablet, Rfl: 0    mupirocin (BACTROBAN) 2 % ointment, Apply to sores on arms 2-3 times daily, Disp: 30 g, Rfl: 3    mupirocin (BACTROBAN) 2 % ointment, Apply to sore spots on arm TID, Disp: 30 g, Rfl: 2    pregabalin (LYRICA) 150 MG capsule, , Disp: , Rfl:     tadalafiL (CIALIS) 20 MG Tab, TAKE ONE (1) TABLET  (20 MG TOTAL) BY MOUTH DAILY AS NEEDED., Disp: 10 tablet, Rfl: 5    triamcinolone acetonide 0.1% (KENALOG) 0.1 % ointment, Apply topically 2 (two) times daily., Disp: 80 g, Rfl: 5    vitamin D (VITAMIN D3) 1000 units Tab, Vitamin D (Cholecalciferol) 25 MCG (1000 UT) Oral Tablet QTY: 0 tablet Days: 0 Refills: 0  Written: 01/03/23 Patient Instructions: qd, Disp: , Rfl:     ROS: A focused review of systems was obtained and negative.     Exam: A focused skin exam was performed. All areas examined were normal except as mentioned in the assessment and plan below.  General Appearance of the patient is well developed and well nourished.  Orientation: alert and oriented x 3.  Mood and affect: pleasant.    Assessment:   There were no encounter diagnoses.    Plan:      Staph skin infection  - honey colored crusted plaques on the right arm     Counseling: I discussed the infectious nature of impetigo with patient.  Impetigo is usually caused by either staph or strep bacteria and is treated with either topical or systemic antibiotics    -bacterial culture obtained from right arm.     No follow-ups on file.    Chanell Santiago MD

## 2024-03-10 LAB — MICROORGANISM SPEC CULT: NORMAL

## 2024-03-18 RX ORDER — ESOMEPRAZOLE MAGNESIUM 40 MG/1
CAPSULE, DELAYED RELEASE ORAL
Qty: 90 CAPSULE | Refills: 2 | Status: SHIPPED | OUTPATIENT
Start: 2024-03-18

## 2024-04-18 RX ORDER — CLINDAMYCIN PHOSPHATE 10 MG/G
GEL TOPICAL 2 TIMES DAILY
Qty: 60 G | Refills: 3 | Status: CANCELLED | OUTPATIENT
Start: 2024-04-18

## 2024-06-06 ENCOUNTER — OFFICE VISIT (OUTPATIENT)
Dept: INTERNAL MEDICINE | Facility: CLINIC | Age: 59
End: 2024-06-06
Payer: COMMERCIAL

## 2024-06-06 VITALS
HEIGHT: 69 IN | BODY MASS INDEX: 32.29 KG/M2 | DIASTOLIC BLOOD PRESSURE: 70 MMHG | SYSTOLIC BLOOD PRESSURE: 125 MMHG | TEMPERATURE: 98 F | OXYGEN SATURATION: 98 % | WEIGHT: 218 LBS | HEART RATE: 80 BPM | RESPIRATION RATE: 16 BRPM

## 2024-06-06 DIAGNOSIS — I73.9 PVD (PERIPHERAL VASCULAR DISEASE): Chronic | ICD-10-CM

## 2024-06-06 DIAGNOSIS — I10 ESSENTIAL HYPERTENSION: ICD-10-CM

## 2024-06-06 DIAGNOSIS — I87.2 VENOUS INSUFFICIENCY: ICD-10-CM

## 2024-06-06 DIAGNOSIS — Z09 FOLLOW-UP EXAM: Primary | ICD-10-CM

## 2024-06-06 DIAGNOSIS — K21.9 GASTROESOPHAGEAL REFLUX DISEASE, UNSPECIFIED WHETHER ESOPHAGITIS PRESENT: ICD-10-CM

## 2024-06-06 DIAGNOSIS — R51.9 FREQUENT HEADACHES: ICD-10-CM

## 2024-06-06 DIAGNOSIS — G89.4 CHRONIC PAIN SYNDROME: ICD-10-CM

## 2024-06-06 PROCEDURE — 3078F DIAST BP <80 MM HG: CPT | Mod: ,,, | Performed by: INTERNAL MEDICINE

## 2024-06-06 PROCEDURE — 99215 OFFICE O/P EST HI 40 MIN: CPT | Mod: S$PBB,,, | Performed by: INTERNAL MEDICINE

## 2024-06-06 PROCEDURE — 99215 OFFICE O/P EST HI 40 MIN: CPT | Mod: PBBFAC | Performed by: INTERNAL MEDICINE

## 2024-06-06 PROCEDURE — 3008F BODY MASS INDEX DOCD: CPT | Mod: ,,, | Performed by: INTERNAL MEDICINE

## 2024-06-06 PROCEDURE — 3044F HG A1C LEVEL LT 7.0%: CPT | Mod: ,,, | Performed by: INTERNAL MEDICINE

## 2024-06-06 PROCEDURE — 3074F SYST BP LT 130 MM HG: CPT | Mod: ,,, | Performed by: INTERNAL MEDICINE

## 2024-06-06 PROCEDURE — 1159F MED LIST DOCD IN RCRD: CPT | Mod: ,,, | Performed by: INTERNAL MEDICINE

## 2024-06-06 RX ORDER — LISINOPRIL 10 MG/1
10 TABLET ORAL DAILY
COMMUNITY
End: 2024-06-06 | Stop reason: SDUPTHER

## 2024-06-06 RX ORDER — DULOXETIN HYDROCHLORIDE 60 MG/1
60 CAPSULE, DELAYED RELEASE ORAL DAILY
Qty: 90 CAPSULE | Refills: 1 | Status: SHIPPED | OUTPATIENT
Start: 2024-06-06

## 2024-06-06 RX ORDER — LISINOPRIL 10 MG/1
10 TABLET ORAL DAILY
Qty: 90 TABLET | Refills: 3 | Status: SHIPPED | OUTPATIENT
Start: 2024-06-06

## 2024-06-06 RX ORDER — TADALAFIL 20 MG/1
TABLET ORAL
Qty: 10 TABLET | Refills: 5 | Status: SHIPPED | OUTPATIENT
Start: 2024-06-06

## 2024-06-06 RX ORDER — CYCLOBENZAPRINE HCL 10 MG
TABLET ORAL
Qty: 30 TABLET | Refills: 2 | Status: SHIPPED | OUTPATIENT
Start: 2024-06-06

## 2024-06-06 RX ORDER — CLINDAMYCIN PHOSPHATE 10 MG/G
GEL TOPICAL 2 TIMES DAILY
Qty: 60 G | Refills: 3 | Status: SHIPPED | OUTPATIENT
Start: 2024-06-06

## 2024-06-06 NOTE — PROGRESS NOTES
Subjective:       Patient ID: Evin Mtz is a 58 y.o. male.    Chief Complaint: Follow-up (Patient is here for 6 month follow up. patient c/o leg pain in both legs)    The patient is a 56-year-old white male the presents today to establish.  We saw him about 6 weeks ago for surgical pre-op risk stratification. He underwent L4-L5 fusion on October 5th. He is recovering well but still has some pain. He has a history of hypertension, GERD, venous insufficiency.  No known history of coronary artery disease, cerebrovascular disease, or CHF, he denies any chest pain at rest or with moderate exertion.  He had a left heart catheterization around 2018 that showed normal coronaries.  He underwent right total knee replacement in 2020 without any complications.  No significant changes in his health since then.  He has chronic bilateral lower extremity edema.  He had a stent to the iliac vein on the right.  This did not improve his symptoms.  He has been seen by vascular surgery and also by vein specialist without much improvement in his symptoms.  Today he is resting comfortably in no distress.  He is afebrile and vital signs are stable.    6/6/24-Mr. Mtz is a 58-year-old male the presents today for follow-up.  He did see vascular surgeon in [place].  Findings consistent with lymphedema.  They did not feel that any surgical intervention would be beneficial.  Recommended to wear compression hose and a pump device.  He has been told before by vascular group in [facility] that he was having some issues above where a stent had been placed in the past.  He continues to have a lot of pain associated with his swelling.  He also complains of occasional headaches.  In a bandlike pattern mainly around the occipital area.  He denies any aggravating or alleviating factors.  They last for about 2 hours and resolve on their own.  Otherwise he is doing okay.  Blood pressure 125/70.  He would lab work done back in April  which has been reviewed.  He is resting comfortably today in no distress.  He is afebrile and vital signs are stable.    Follow-up  Associated symptoms include headaches. Pertinent negatives include no abdominal pain, arthralgias, chest pain, chills, coughing, fatigue, fever, joint swelling, myalgias, nausea, neck pain, rash, sore throat or weakness.   Fatigue  Associated symptoms include headaches. Pertinent negatives include no abdominal pain, arthralgias, chest pain, chills, coughing, fatigue, fever, joint swelling, myalgias, nausea, neck pain, rash, sore throat or weakness.     Review of Systems   Constitutional:  Negative for appetite change, chills, fatigue and fever.   HENT:  Negative for ear pain, hearing loss, sinus pressure/congestion and sore throat.    Eyes:  Negative for pain, redness and visual disturbance.   Respiratory:  Negative for apnea, cough, shortness of breath and wheezing.    Cardiovascular:  Positive for leg swelling. Negative for chest pain and palpitations.   Gastrointestinal:  Negative for abdominal pain, blood in stool, constipation, diarrhea and nausea.   Endocrine: Negative for cold intolerance, heat intolerance and polyuria.   Genitourinary:  Negative for dysuria and hematuria.   Musculoskeletal:  Positive for leg pain. Negative for arthralgias, back pain, joint swelling, myalgias and neck pain.   Integumentary:  Negative for pallor, rash and mole/lesion.   Allergic/Immunologic: Negative for frequent infections.   Neurological:  Positive for headaches. Negative for tremors, seizures and weakness.   Hematological:  Negative for adenopathy.   Psychiatric/Behavioral:  Negative for confusion, dysphoric mood and sleep disturbance. The patient is not nervous/anxious.          Objective:      Physical Exam  Vitals and nursing note reviewed.   Constitutional:       General: He is not in acute distress.     Appearance: Normal appearance. He is not ill-appearing.   HENT:      Head:  Normocephalic and atraumatic.      Right Ear: External ear normal.      Left Ear: External ear normal.      Nose: Nose normal.      Mouth/Throat:      Pharynx: Oropharynx is clear.   Eyes:      Extraocular Movements: Extraocular movements intact.      Conjunctiva/sclera: Conjunctivae normal.      Pupils: Pupils are equal, round, and reactive to light.   Neck:      Vascular: No carotid bruit.   Cardiovascular:      Rate and Rhythm: Normal rate and regular rhythm.      Pulses: Normal pulses.      Heart sounds: Normal heart sounds. No murmur heard.  Pulmonary:      Effort: No respiratory distress.      Breath sounds: Normal breath sounds. No wheezing or rales.   Abdominal:      General: Bowel sounds are normal. There is no distension.      Palpations: Abdomen is soft.   Musculoskeletal:         General: Normal range of motion.      Cervical back: Normal range of motion and neck supple.      Right lower leg: Edema present.      Left lower leg: Edema present.      Comments: Pitting edema bilateral lower extremities   Skin:     General: Skin is warm and dry.      Capillary Refill: Capillary refill takes less than 2 seconds.      Coloration: Skin is not pale.   Neurological:      General: No focal deficit present.      Mental Status: He is alert and oriented to person, place, and time.      Cranial Nerves: No cranial nerve deficit.      Motor: No weakness.   Psychiatric:         Mood and Affect: Mood normal.         Judgment: Judgment normal.         Assessment:       Problem List Items Addressed This Visit          Neuro    Chronic pain syndrome    Frequent headaches    Relevant Orders    Ambulatory referral/consult to Neurology       Cardiac/Vascular    PVD (peripheral vascular disease) (Chronic)    Essential hypertension    Venous insufficiency       GI    Gastroesophageal reflux disease     Other Visit Diagnoses       Follow-up exam    -  Primary            Plan:       1. The patient presents today for follow up.  Lab  work done back in April has been reviewed.  Creatinine was 0.82.  A1c was 5.7%.  B12 and folate both within normal limits.  Total cholesterol 215, HDL 39,  H&H 13 and 38.5.  He  had a colonoscopy in the past and should not be due for another 1 yet..  We will see if we can get that documented.  Age-appropriate screenings are up-to-date.  Flu vaccine today    2. Essential hypertension-blood pressure is at goal.  Currently 125/70 he is currently only taking Lasix p.r.n.    3. GERD-continue with PPI daily    4. Erectile dysfunction-he uses Cialis p.r.n.    5. Chronic venous disease-he can not tolerate compression hose.  He uses Lasix p.r.n.  This is his biggest issue currently. He has seen Dr. Reed and Dr. Stern in the past. We are going to refer back to Dr. Stern. He states that he cannot wear lymphatic pumps because of time. He wants to know if there is something else that can be done. For now we will continue with lasix 40 mg and he will take twice a day for the next couple of days. Elevate leg when seated. He has a history of venous stent and was on antiplatelet therapy for about 1 year. This is not needed any longer.  7/13- He was seen by vein clinic in [place] and they believe that he would benefit from and extension of his prior vein stent. He is going to set that up once his wife has healed from recent surgery.  5/30/23-following his back surgery is swelling in his lower extremities improved for a while.  However over the last 6 weeks they have started swelling again.  11/28/23-this is his biggest issue.  He has an appointment now with a vascular surgeon in [place].  This is in January 6/6/24-he was seen in [place].  Findings firm lymphedema.  They did not feel surgical intervention with be of benefit.  His leg pain continues to be his biggest issue.  He would like to get back in with 's office for a 2nd opinion.  We will see if we can help facilitate that.    6. Eczema/ prurigo nodularis-  He has kenalog ointment. He has seen dermatology  -he continues to follow with dermatology.  Still has issues at times    7. Fatigue-common complaint.  He has not had thyroid or testosterone checked so we will check that.---both checked out okay    8. Headache-possible occipital neuralgia.  He is already on Lyrica and baclofen.  We are going to increase his Cymbalta to 60 mg daily and see if we can get him in with Neurology.    Billing based on 40 minutes of time spent on this encounter    He will follow-up in 6 months or sooner if needed.

## 2024-06-20 ENCOUNTER — TELEPHONE (OUTPATIENT)
Dept: INTERNAL MEDICINE | Facility: CLINIC | Age: 59
End: 2024-06-20
Payer: COMMERCIAL

## 2024-06-20 NOTE — TELEPHONE ENCOUNTER
Spoke w/  regarding appt w/  office. Got him scheduled for 6/27/24 @ 1:30. Patient is aware of his appt. Faxing over most recent clinic note from .

## 2024-08-08 ENCOUNTER — OFFICE VISIT (OUTPATIENT)
Dept: NEUROLOGY | Facility: CLINIC | Age: 59
End: 2024-08-08
Payer: COMMERCIAL

## 2024-08-08 VITALS
HEIGHT: 69 IN | BODY MASS INDEX: 33.23 KG/M2 | RESPIRATION RATE: 18 BRPM | SYSTOLIC BLOOD PRESSURE: 139 MMHG | DIASTOLIC BLOOD PRESSURE: 80 MMHG | WEIGHT: 224.38 LBS | OXYGEN SATURATION: 93 % | HEART RATE: 83 BPM

## 2024-08-08 DIAGNOSIS — M43.16 SPONDYLOLISTHESIS, LUMBAR REGION: ICD-10-CM

## 2024-08-08 DIAGNOSIS — G89.4 CHRONIC PAIN SYNDROME: ICD-10-CM

## 2024-08-08 DIAGNOSIS — R51.9 FREQUENT HEADACHES: ICD-10-CM

## 2024-08-08 DIAGNOSIS — G44.209 ACUTE NON INTRACTABLE TENSION-TYPE HEADACHE: Primary | ICD-10-CM

## 2024-08-08 PROCEDURE — 1159F MED LIST DOCD IN RCRD: CPT | Mod: ,,, | Performed by: NURSE PRACTITIONER

## 2024-08-08 PROCEDURE — 3044F HG A1C LEVEL LT 7.0%: CPT | Mod: ,,, | Performed by: NURSE PRACTITIONER

## 2024-08-08 PROCEDURE — 3079F DIAST BP 80-89 MM HG: CPT | Mod: ,,, | Performed by: NURSE PRACTITIONER

## 2024-08-08 PROCEDURE — 1160F RVW MEDS BY RX/DR IN RCRD: CPT | Mod: ,,, | Performed by: NURSE PRACTITIONER

## 2024-08-08 PROCEDURE — 99203 OFFICE O/P NEW LOW 30 MIN: CPT | Mod: S$PBB,,, | Performed by: NURSE PRACTITIONER

## 2024-08-08 PROCEDURE — 4010F ACE/ARB THERAPY RXD/TAKEN: CPT | Mod: ,,, | Performed by: NURSE PRACTITIONER

## 2024-08-08 PROCEDURE — 99215 OFFICE O/P EST HI 40 MIN: CPT | Mod: PBBFAC | Performed by: NURSE PRACTITIONER

## 2024-08-08 PROCEDURE — 99999 PR PBB SHADOW E&M-EST. PATIENT-LVL V: CPT | Mod: PBBFAC,,, | Performed by: NURSE PRACTITIONER

## 2024-08-08 PROCEDURE — 3008F BODY MASS INDEX DOCD: CPT | Mod: ,,, | Performed by: NURSE PRACTITIONER

## 2024-08-08 PROCEDURE — 3075F SYST BP GE 130 - 139MM HG: CPT | Mod: ,,, | Performed by: NURSE PRACTITIONER

## 2024-08-08 RX ORDER — DESONIDE 0.5 MG/G
CREAM TOPICAL
COMMUNITY
Start: 2024-02-19

## 2024-08-08 RX ORDER — CHOLECALCIFEROL (VITAMIN D3) 25 MCG
1 TABLET ORAL EVERY MORNING
COMMUNITY

## 2024-08-12 DIAGNOSIS — L28.1 PRURIGO NODULARIS: Primary | ICD-10-CM

## 2024-08-12 DIAGNOSIS — Z79.899 HIGH RISK MEDICATION USE: ICD-10-CM

## 2024-08-12 RX ORDER — DUPILUMAB 300 MG/2ML
INJECTION, SOLUTION SUBCUTANEOUS
Qty: 2 EACH | Refills: 11 | Status: SHIPPED | OUTPATIENT
Start: 2024-08-12

## 2024-08-23 DIAGNOSIS — B95.62 INFECTION OF SKIN DUE TO METHICILLIN RESISTANT STAPHYLOCOCCUS AUREUS (MRSA): ICD-10-CM

## 2024-08-23 DIAGNOSIS — L08.9 INFECTION OF SKIN DUE TO METHICILLIN RESISTANT STAPHYLOCOCCUS AUREUS (MRSA): ICD-10-CM

## 2024-08-23 RX ORDER — LINEZOLID 600 MG/1
600 TABLET, FILM COATED ORAL 2 TIMES DAILY
Qty: 28 TABLET | Refills: 0 | Status: SHIPPED | OUTPATIENT
Start: 2024-08-23 | End: 2024-09-06

## 2024-08-23 NOTE — TELEPHONE ENCOUNTER
Dr. Santiago said to refill antibiotics for 2 weeks and have pt come to clinic in 2 weeks for a culture. Called pt and spoke with him regarding antibiotics and got him scheduled for 09/05/2024 for a nurse visit.     ----- Message from Windy Becerril sent at 8/22/2024  1:26 PM CDT -----  Pt wife called [phone], [facility]  Pt needs more antibiotics for his skin condition.

## 2024-08-27 ENCOUNTER — TELEPHONE (OUTPATIENT)
Dept: INTERNAL MEDICINE | Facility: CLINIC | Age: 59
End: 2024-08-27
Payer: COMMERCIAL

## 2024-08-27 NOTE — TELEPHONE ENCOUNTER
Spoke to spouse and she states that pt recently had a stent placed but is still having leg pain. Pt has not followed up with the provider that did the surgery but has an appt. I spoke to dr claros and he is advising that pt reach out to that provider. I have informed spouse of this.

## 2024-08-27 NOTE — TELEPHONE ENCOUNTER
----- Message from Juli Mtz sent at 8/27/2024 12:36 PM CDT -----  Who Called: Marla (wife)    Caller is requesting a sooner appointment. Caller declined first available appointment listed below. Caller will not accept being placed on the waitlist and is requesting a message be sent to doctor.    When is the first available appointment? 2/18  Symptoms: jaye leg swelling      Preferred Method of Contact: Phone Call    Preferred Method of Contact: Phone Call  Patient's Preferred Phone Number on File: [phone]   Best Call Back Number, if different:call back # [phone] - ext - [phone]  Additional Information:

## 2024-09-05 ENCOUNTER — CLINICAL SUPPORT (OUTPATIENT)
Dept: DERMATOLOGY | Facility: CLINIC | Age: 59
End: 2024-09-05
Payer: COMMERCIAL

## 2024-09-05 VITALS — RESPIRATION RATE: 18 BRPM | HEIGHT: 69 IN | WEIGHT: 224.44 LBS | BODY MASS INDEX: 33.24 KG/M2

## 2024-09-05 DIAGNOSIS — L08.9 SKIN INFECTION: Primary | ICD-10-CM

## 2024-09-05 PROCEDURE — 87070 CULTURE OTHR SPECIMN AEROBIC: CPT | Mod: ,,, | Performed by: CLINICAL MEDICAL LABORATORY

## 2024-09-05 NOTE — PROGRESS NOTES
Skin Infection, NOS   -     Plan: Counseling  I counseled the patient regarding the following:  Skin care: Patients with purulence or fluid collections should have their wounds re-opened, drained, cultured and irrigated. All wound infections should be treated with antibiotics.  Expectations: Wound Infections usually occur 4-7 days postoperatively. Patients exhibit, pain, rednes, swelling, cellulitic changes and fever.  Contact Office if: Wound Infection fails to respond to treatment or worsens, patient develops a fever, or if redness spreads despite antibiotics.    A bacterial culture was obtained from the right arm    - Patient completed course of linezolid.

## 2024-09-07 LAB — MICROORGANISM SPEC CULT: NORMAL

## 2024-09-16 RX ORDER — CYCLOBENZAPRINE HCL 10 MG
TABLET ORAL
Qty: 30 TABLET | Refills: 2 | Status: SHIPPED | OUTPATIENT
Start: 2024-09-16

## 2024-10-18 DIAGNOSIS — M79.604 BILATERAL LOWER EXTREMITY PAIN: Primary | ICD-10-CM

## 2024-10-18 DIAGNOSIS — M79.605 BILATERAL LOWER EXTREMITY PAIN: Primary | ICD-10-CM

## 2024-10-24 ENCOUNTER — HOSPITAL ENCOUNTER (EMERGENCY)
Facility: HOSPITAL | Age: 59
Discharge: HOME | End: 2024-10-24
Attending: EMERGENCY MEDICINE
Payer: COMMERCIAL

## 2024-10-24 VITALS
OXYGEN SATURATION: 96 % | SYSTOLIC BLOOD PRESSURE: 128 MMHG | TEMPERATURE: 97 F | HEART RATE: 67 BPM | WEIGHT: 229 LBS | DIASTOLIC BLOOD PRESSURE: 84 MMHG | HEIGHT: 69 IN | BODY MASS INDEX: 33.92 KG/M2 | RESPIRATION RATE: 16 BRPM

## 2024-10-24 DIAGNOSIS — S01.01XA SCALP LACERATION, INITIAL ENCOUNTER: Primary | ICD-10-CM

## 2024-10-24 PROCEDURE — G0390 TRAUMA RESPONS W/HOSP CRITI: HCPCS | Mod: TRAUMA2

## 2024-10-24 PROCEDURE — 99285 EMERGENCY DEPT VISIT HI MDM: CPT | Mod: 25

## 2024-10-24 PROCEDURE — 12001 RPR S/N/AX/GEN/TRNK 2.5CM/<: CPT

## 2024-10-28 ENCOUNTER — CLINICAL SUPPORT (OUTPATIENT)
Dept: REHABILITATION | Facility: HOSPITAL | Age: 59
End: 2024-10-28
Payer: COMMERCIAL

## 2024-10-28 DIAGNOSIS — M79.604 BILATERAL LOWER EXTREMITY PAIN: Primary | ICD-10-CM

## 2024-10-28 DIAGNOSIS — M79.604 BILATERAL LOWER EXTREMITY PAIN: ICD-10-CM

## 2024-10-28 DIAGNOSIS — M79.605 BILATERAL LOWER EXTREMITY PAIN: ICD-10-CM

## 2024-10-28 DIAGNOSIS — M79.605 BILATERAL LOWER EXTREMITY PAIN: Primary | ICD-10-CM

## 2024-11-21 ENCOUNTER — OFFICE VISIT (OUTPATIENT)
Dept: FAMILY MEDICINE | Facility: CLINIC | Age: 59
End: 2024-11-21
Payer: COMMERCIAL

## 2024-11-21 VITALS
DIASTOLIC BLOOD PRESSURE: 70 MMHG | HEIGHT: 69 IN | SYSTOLIC BLOOD PRESSURE: 118 MMHG | OXYGEN SATURATION: 96 % | HEART RATE: 63 BPM | BODY MASS INDEX: 34.78 KG/M2 | TEMPERATURE: 98 F | RESPIRATION RATE: 18 BRPM | WEIGHT: 234.81 LBS

## 2024-11-21 DIAGNOSIS — Z23 FLU VACCINE NEED: Primary | ICD-10-CM

## 2024-11-21 DIAGNOSIS — M16.11 ARTHRITIS OF RIGHT HIP: ICD-10-CM

## 2024-11-21 DIAGNOSIS — Z23 NEED FOR PNEUMOCOCCAL 20-VALENT CONJUGATE VACCINATION: ICD-10-CM

## 2024-11-21 DIAGNOSIS — I89.0 LYMPHEDEMA: ICD-10-CM

## 2024-11-21 DIAGNOSIS — I87.2 VENOUS INSUFFICIENCY: ICD-10-CM

## 2024-11-21 DIAGNOSIS — Z09 FOLLOW-UP EXAM: ICD-10-CM

## 2024-11-21 DIAGNOSIS — G89.4 CHRONIC PAIN SYNDROME: ICD-10-CM

## 2024-11-21 DIAGNOSIS — I10 ESSENTIAL HYPERTENSION: ICD-10-CM

## 2024-11-21 DIAGNOSIS — I73.9 PVD (PERIPHERAL VASCULAR DISEASE): Chronic | ICD-10-CM

## 2024-11-21 DIAGNOSIS — K21.9 GASTROESOPHAGEAL REFLUX DISEASE, UNSPECIFIED WHETHER ESOPHAGITIS PRESENT: ICD-10-CM

## 2024-11-21 RX ORDER — KETOROLAC TROMETHAMINE 30 MG/ML
60 INJECTION, SOLUTION INTRAMUSCULAR; INTRAVENOUS
Status: COMPLETED | OUTPATIENT
Start: 2024-11-21 | End: 2024-11-21

## 2024-11-21 RX ADMIN — KETOROLAC TROMETHAMINE 60 MG: 30 INJECTION, SOLUTION INTRAMUSCULAR; INTRAVENOUS at 08:11

## 2024-11-21 NOTE — PROGRESS NOTES
Subjective:       Patient ID: Evin Mtz is a 59 y.o. male.    Chief Complaint: Follow-up (Has not been taking bp medication)    The patient is a 56-year-old white male the presents today to establish.  We saw him about 6 weeks ago for surgical pre-op risk stratification. He underwent L4-L5 fusion on October 5th. He is recovering well but still has some pain. He has a history of hypertension, GERD, venous insufficiency.  No known history of coronary artery disease, cerebrovascular disease, or CHF, he denies any chest pain at rest or with moderate exertion.  He had a left heart catheterization around 2018 that showed normal coronaries.  He underwent right total knee replacement in 2020 without any complications.  No significant changes in his health since then.  He has chronic bilateral lower extremity edema.  He had a stent to the iliac vein on the right.  This did not improve his symptoms.  He has been seen by vascular surgery and also by vein specialist without much improvement in his symptoms.  Today he is resting comfortably in no distress.  He is afebrile and vital signs are stable.    11/2124-Mr. Mtz is a 59-year-old male the presents today for follow-up.  Since we last saw him he did get a 2nd opinion from vein specialist in [facility].  They went in at the end of June and extended the stent in the iliac vein.  He states that for about 3-4 days he had some improvement in symptoms but then they returned.  His biggest issue continues to be pain.  He does follow with pain management and they feel like might benefit from an injection in that right hip.  He has not been wearing his lymphatic pumps.  But plans to.  Also he has not been on his blood pressure medication now for 3-4 weeks.  Blood pressure today is 118/70.  He is supposed to see Cardiology this upcoming Monday.  He is getting a flu and pneumonia vaccine today.  He is currently resting comfortably in no distress.  He is afebrile and  vital signs are stable.    Follow-up  Pertinent negatives include no abdominal pain, arthralgias, chest pain, chills, coughing, fatigue, fever, headaches, joint swelling, myalgias, nausea, neck pain, rash, sore throat or weakness.   Fatigue  Pertinent negatives include no abdominal pain, arthralgias, chest pain, chills, coughing, fatigue, fever, headaches, joint swelling, myalgias, nausea, neck pain, rash, sore throat or weakness.     Review of Systems   Constitutional:  Negative for appetite change, chills, fatigue and fever.   HENT:  Negative for ear pain, hearing loss, sinus pressure/congestion and sore throat.    Eyes:  Negative for pain, redness and visual disturbance.   Respiratory:  Negative for apnea, cough, shortness of breath and wheezing.    Cardiovascular:  Positive for leg swelling. Negative for chest pain and palpitations.   Gastrointestinal:  Negative for abdominal pain, blood in stool, constipation, diarrhea and nausea.   Endocrine: Negative for cold intolerance, heat intolerance and polyuria.   Genitourinary:  Negative for dysuria and hematuria.   Musculoskeletal:  Positive for leg pain. Negative for arthralgias, back pain, joint swelling, myalgias and neck pain.   Integumentary:  Negative for pallor, rash and mole/lesion.   Allergic/Immunologic: Negative for frequent infections.   Neurological:  Negative for tremors, seizures, weakness and headaches.   Hematological:  Negative for adenopathy.   Psychiatric/Behavioral:  Negative for confusion, dysphoric mood and sleep disturbance. The patient is not nervous/anxious.          Objective:      Physical Exam  Vitals and nursing note reviewed.   Constitutional:       General: He is not in acute distress.     Appearance: Normal appearance. He is not ill-appearing.   HENT:      Head: Normocephalic and atraumatic.      Right Ear: External ear normal.      Left Ear: External ear normal.      Nose: Nose normal.      Mouth/Throat:      Pharynx: Oropharynx is  clear.   Eyes:      Extraocular Movements: Extraocular movements intact.      Conjunctiva/sclera: Conjunctivae normal.      Pupils: Pupils are equal, round, and reactive to light.   Neck:      Vascular: No carotid bruit.   Cardiovascular:      Rate and Rhythm: Normal rate and regular rhythm.      Pulses: Normal pulses.      Heart sounds: Normal heart sounds. No murmur heard.  Pulmonary:      Effort: No respiratory distress.      Breath sounds: Normal breath sounds. No wheezing or rales.   Abdominal:      General: Bowel sounds are normal. There is no distension.      Palpations: Abdomen is soft.   Musculoskeletal:         General: Normal range of motion.      Cervical back: Normal range of motion and neck supple.      Right lower leg: Edema present.      Left lower leg: Edema present.      Comments: Pitting edema bilateral lower extremities   Skin:     General: Skin is warm and dry.      Capillary Refill: Capillary refill takes less than 2 seconds.      Coloration: Skin is not pale.   Neurological:      General: No focal deficit present.      Mental Status: He is alert and oriented to person, place, and time.      Cranial Nerves: No cranial nerve deficit.      Motor: No weakness.   Psychiatric:         Mood and Affect: Mood normal.         Judgment: Judgment normal.         Assessment:       Problem List Items Addressed This Visit          Neuro    Chronic pain syndrome       Cardiac/Vascular    PVD (peripheral vascular disease) (Chronic)    Essential hypertension    Venous insufficiency       GI    Gastroesophageal reflux disease       Orthopedic    Arthritis of right hip       Other    Lymphedema     Other Visit Diagnoses       Flu vaccine need    -  Primary    Relevant Medications    influenza (Flulaval, Fluzone, Fluarix) 45 mcg/0.5 mL IM vaccine (> or = 6 mo) 0.5 mL (Completed)    Need for pneumococcal 20-valent conjugate vaccination        Relevant Medications    pneumoc 20-nils conj-dip cr(PF) (PREVNAR-20 (PF))  injection Syrg 0.5 mL (Completed)    Follow-up exam                Plan:       1. The patient presents today for follow up. He  had a colonoscopy a few years ago outside of our system and was told he would return in 10 years.  Plan for flu and pneumonia vaccine today.  He is going to get Prevnar 20    2. Essential hypertension-blood pressure is at goal.  Currently 118/70 he is currently only taking Lasix p.r.n.    3. GERD-continue with PPI daily    4. Erectile dysfunction-he uses Cialis p.r.n.    5. Chronic venous disease-he can not tolerate compression hose.  He uses Lasix p.r.n.  This is his biggest issue currently. He has seen Dr. Reed and Dr. Stern in the past. We are going to refer back to Dr. Stern. He states that he cannot wear lymphatic pumps because of time. He wants to know if there is something else that can be done. For now we will continue with lasix 40 mg and he will take twice a day for the next couple of days. Elevate leg when seated. He has a history of venous stent and was on antiplatelet therapy for about 1 year. This is not needed any longer.  7/13- He was seen by vein clinic in [place] and they believe that he would benefit from and extension of his prior vein stent. He is going to set that up once his wife has healed from recent surgery.  5/30/23-following his back surgery is swelling in his lower extremities improved for a while.  However over the last 6 weeks they have started swelling again.  11/28/23-this is his biggest issue.  He has an appointment now with a vascular surgeon in [place].  This is in January 6/6/24-he was seen in [place].  Findings firm lymphedema.  They did not feel surgical intervention with be of benefit.  His leg pain continues to be his biggest issue.  He would like to get back in with 's office for a 2nd opinion.  We will see if we can help facilitate that.  11/21/24-he went for 2nd opinion and they extended his iliac vein stent.  He had some  improvement of symptoms and briefly but they have returned.  Leg pain continues to be his biggest issue.  He has lymphatic pumps that he plans to wear.    6. Eczema/ prurigo nodularis- He has kenalog ointment. He has seen dermatology  -he continues to follow with dermatology.  Still has issues at times    7. Fatigue-common complaint.  He has not had thyroid or testosterone checked so we will check that.---both checked out okay    8. Headache-possible occipital neuralgia.  He is already on Lyrica and baclofen.  Headaches are doing better    Billing based on 41 minutes of time spent on this encounter    He will follow-up in 6 months or sooner if needed.

## 2024-11-25 ENCOUNTER — OFFICE VISIT (OUTPATIENT)
Dept: CARDIOLOGY | Facility: CLINIC | Age: 59
End: 2024-11-25
Payer: COMMERCIAL

## 2024-11-25 VITALS
HEIGHT: 69 IN | DIASTOLIC BLOOD PRESSURE: 60 MMHG | BODY MASS INDEX: 34.51 KG/M2 | OXYGEN SATURATION: 94 % | WEIGHT: 233 LBS | HEART RATE: 85 BPM | SYSTOLIC BLOOD PRESSURE: 130 MMHG

## 2024-11-25 DIAGNOSIS — I10 ESSENTIAL HYPERTENSION: ICD-10-CM

## 2024-11-25 DIAGNOSIS — M79.89 LEG SWELLING: Primary | ICD-10-CM

## 2024-11-25 DIAGNOSIS — R06.02 SHORTNESS OF BREATH: ICD-10-CM

## 2024-11-25 PROCEDURE — 93010 ELECTROCARDIOGRAM REPORT: CPT | Mod: S$PBB,,, | Performed by: STUDENT IN AN ORGANIZED HEALTH CARE EDUCATION/TRAINING PROGRAM

## 2024-11-25 PROCEDURE — 93005 ELECTROCARDIOGRAM TRACING: CPT | Mod: PBBFAC | Performed by: STUDENT IN AN ORGANIZED HEALTH CARE EDUCATION/TRAINING PROGRAM

## 2024-11-25 PROCEDURE — 99215 OFFICE O/P EST HI 40 MIN: CPT | Mod: PBBFAC,25 | Performed by: STUDENT IN AN ORGANIZED HEALTH CARE EDUCATION/TRAINING PROGRAM

## 2024-11-25 NOTE — PROGRESS NOTES
PCP: Torito Santos DO    Referring Provider:     Subjective:   Evin Mtz is a 59 y.o. male with hx of *** who presents for ***        Fhx:  Shx:     EKG***  ECHO No results found for this or any previous visit.     CATH: No results found for this or any previous visit.     Lab Results   Component Value Date     07/11/2024    K 3.9 07/11/2024     (H) 07/11/2024    CO2 26 07/11/2024    BUN 11 07/11/2024    CREATININE 0.74 07/11/2024    CALCIUM 9.0 07/11/2024    ANIONGAP 5 (L) 07/11/2024    ESTGFRAFRICA 103 07/11/2024    EGFRNONAA 104 04/21/2022       Lab Results   Component Value Date    CHOL 215 (H) 04/16/2024     Lab Results   Component Value Date    HDL 39 (L) 04/16/2024     Lab Results   Component Value Date    LDLCALC 105 04/16/2024     Lab Results   Component Value Date    TRIG 355 (H) 04/16/2024     Lab Results   Component Value Date    CHOLHDL 5.5 04/16/2024       Lab Results   Component Value Date    WBC 5.18 04/16/2024    HGB 13.0 (L) 04/16/2024    HCT 38.5 (L) 04/16/2024    MCV 95.5 04/16/2024     04/16/2024           Current Outpatient Medications:     albuterol (VENTOLIN HFA) 90 mcg/actuation inhaler, Inhale 2 puffs into the lungs every 6 (six) hours as needed for Wheezing. Rescue, Disp: 8.5 g, Rfl: 3    apixaban (ELIQUIS) 2.5 mg Tab, Take 1 tablet by mouth in the morning and 1 tablet before bedtime., Disp: 60 tablet, Rfl: 3    ascorbic acid, vitamin C, (VITAMIN C) 1000 MG tablet, Vitamin C 1000 MG Oral Tablet QTY: 0 tablet Days: 0 Refills: 0  Written: 01/03/23 Patient Instructions: qd, Disp: , Rfl:     baclofen (LIORESAL) 10 MG tablet, Take 1 tablet three times a day for 90 day(s) .. may cause drowsiness, Disp: 270 tablet, Rfl: 1    clindamycin phosphate 1% (CLINDAGEL) 1 % gel, Apply topically to affected area 2 (two) times daily., Disp: 60 g, Rfl: 3    cyclobenzaprine (FLEXERIL) 10 MG tablet, TAKE ONE (1) TABLET  BY MOUTH THREE (3)  TIMES DAILY AS NEEDED FOR MUSCLE SPASMS.,  Disp: 30 tablet, Rfl: 2    desonide (DESOWEN) 0.05 % cream, Apply topically., Disp: , Rfl:     dextroamphetamine-amphetamine (ADDERALL) 20 mg tablet, TAKE 1 TABLET BY MOUTH IN THE MORNING,  1 TABLET AT NOON AND 1/2 TABLET AT 4 PM (Patient not taking: Reported on 11/21/2024), Disp: 75 tablet, Rfl: 0    dextroamphetamine-amphetamine (ADDERALL) 20 mg tablet, TAKE 1 TABLET BY MOUTH ONCE IN THE MORNING, 1 TABLET AT NOON AND 1/2 TABLET AT 4 PM, Disp: 75 tablet, Rfl: 0    [START ON 12/27/2024] dextroamphetamine-amphetamine (ADDERALL) 20 mg tablet, Take one tablet by mouth in the morning, one tablet at noon, and one-half tablet at 4 in the evening, Disp: 75 tablet, Rfl: 0    DULoxetine (CYMBALTA) 30 MG capsule, Take 1 Capsule by mouth daily, Disp: 30 capsule, Rfl: 6    DUPIXENT SYRINGE 300 mg/2 mL Syrg, Starting on Day 15: Give 1 injection (300mg) subcutaneously every 2 weeks., Disp: 2 each, Rfl: 11    ergocalciferol, vitamin D2, (VITAMIN D ORAL), Take 300 mg by mouth., Disp: , Rfl:     esomeprazole (NEXIUM) 40 MG capsule, TAKE ONE (1) CAPSULE BY MOUTH EACH MORNING, Disp: 90 capsule, Rfl: 2    fluticasone propionate (FLONASE) 50 mcg/actuation nasal spray, 1 spray (50 mcg total) by Each Nostril route once daily., Disp: 16 g, Rfl: 3    furosemide (LASIX) 40 MG tablet, Take 1 tablet (40 mg total) by mouth once daily., Disp: 90 tablet, Rfl: 3    lisinopriL 10 MG tablet, Take 1 tablet (10 mg total) by mouth once daily. (Patient not taking: Reported on 11/21/2024), Disp: 90 tablet, Rfl: 3    mupirocin (BACTROBAN) 2 % ointment, Apply to sore spots on arm three times daily, Disp: 30 g, Rfl: 2    oxyCODONE-acetaminophen (PERCOCET)  mg per tablet, Take 1 tablet by mouth 4 (four) times daily as needed for pain... May cause drowsiness, Disp: 120 tablet, Rfl: 0    oxyCODONE-acetaminophen (PERCOCET)  mg per tablet, take 1 tablet by mouth 4 times a day as needed, Disp: 120 tablet, Rfl: 0    pregabalin (LYRICA) 150 MG capsule,  Take 1 capsule (150 mg total) by mouth 3 (three) times daily., Disp: 90 capsule, Rfl: 5    tadalafiL (CIALIS) 20 MG Tab, TAKE ONE (1) TABLET (20 MG TOTAL) BY MOUTH DAILY AS NEEDED., Disp: 10 tablet, Rfl: 5    Review of Systems   Constitutional:  Negative for chills, diaphoresis, fever and malaise/fatigue.   Respiratory:  Positive for shortness of breath. Negative for cough.    Cardiovascular:  Positive for leg swelling. Negative for chest pain, palpitations, orthopnea, claudication and PND.   Gastrointestinal:  Negative for abdominal pain, heartburn, nausea and vomiting.   Neurological:  Negative for dizziness.       Objective:   There were no vitals taken for this visit.    Physical Exam  Constitutional:       General: He is not in acute distress.     Appearance: Normal appearance.   Cardiovascular:      Rate and Rhythm: Normal rate and regular rhythm.      Pulses: Normal pulses.      Heart sounds: Normal heart sounds. No murmur heard.     No friction rub. No gallop.   Pulmonary:      Effort: Pulmonary effort is normal.      Breath sounds: Normal breath sounds. No wheezing or rales.   Musculoskeletal:      Right lower leg: No edema.      Left lower leg: No edema.   Skin:     General: Skin is warm and dry.   Neurological:      Mental Status: He is alert.           Assessment:     1. Essential hypertension              Plan:   No problem-specific Assessment & Plan notes found for this encounter.           "nausea and vomiting.   Neurological:  Negative for dizziness.       Objective:   /60 (BP Location: Left arm, Patient Position: Sitting)   Pulse 85   Ht 5' 9" (1.753 m)   Wt 105.7 kg (233 lb)   SpO2 (!) 94%   BMI 34.41 kg/m²     Physical Exam  Constitutional:       General: He is not in acute distress.     Appearance: Normal appearance.   Cardiovascular:      Rate and Rhythm: Normal rate and regular rhythm.      Pulses: Normal pulses.      Heart sounds: Normal heart sounds. No murmur heard.     No friction rub. No gallop.   Pulmonary:      Effort: Pulmonary effort is normal.      Breath sounds: Normal breath sounds. No wheezing or rales.   Musculoskeletal:      Right lower leg: Edema present.      Left lower leg: Edema present.   Skin:     General: Skin is warm and dry.   Neurological:      Mental Status: He is alert.           Assessment:     1. Leg swelling  Echo    Basic Metabolic Panel    NT-Pro Natriuretic Peptide      2. Essential hypertension  EKG 12-lead    EKG 12-lead      3. Shortness of breath  Echo            Plan:   No problem-specific Assessment & Plan notes found for this encounter.    Dyspnea on exertion  Lower extremity edema:  Last venous stenting 7/19/24 ([facility])  - Schedule echo  - continue lasix 40 mg daily  - Check BMP and proBNP     Follow-up in 6 months   "

## 2024-11-27 NOTE — ED TRIAGE NOTES
Patient reports that he has had difficulty catching his breath since lunch time today; patient denies chest pain; he reports falling on his chest in the past month- he states that he is always getting banged up at his job.    patient

## 2024-12-05 ENCOUNTER — HOSPITAL ENCOUNTER (OUTPATIENT)
Dept: CARDIOLOGY | Facility: HOSPITAL | Age: 59
Discharge: HOME | End: 2024-12-05
Attending: STUDENT IN AN ORGANIZED HEALTH CARE EDUCATION/TRAINING PROGRAM
Payer: COMMERCIAL

## 2024-12-05 DIAGNOSIS — R06.02 SHORTNESS OF BREATH: ICD-10-CM

## 2024-12-05 DIAGNOSIS — M79.89 LEG SWELLING: ICD-10-CM

## 2024-12-05 LAB
AORTIC ROOT ANNULUS: 2.21 CM
AORTIC VALVE CUSP SEPERATION: 1.99 CM
AV INDEX (PROSTH): 0.69
AV MEAN GRADIENT: 6.5 MMHG
AV PEAK GRADIENT: 13 MMHG
AV VALVE AREA BY VELOCITY RATIO: 2.3 CM²
AV VALVE AREA: 2.2 CM²
AV VELOCITY RATIO: 0.72
CV ECHO LV RWT: 0.3 CM
DOP CALC AO PEAK VEL: 1.8 M/S
DOP CALC AO VTI: 40 CM
DOP CALC LVOT AREA: 3.1 CM2
DOP CALC LVOT DIAMETER: 2 CM
DOP CALC LVOT PEAK VEL: 1.3 M/S
DOP CALC LVOT STROKE VOLUME: 86.4 CM3
DOP CALCLVOT PEAK VEL VTI: 27.5 CM
E WAVE DECELERATION TIME: 213.21 MSEC
E/A RATIO: 0.83
E/E' RATIO: 6 M/S
ECHO LV POSTERIOR WALL: 0.8 CM (ref 0.6–1.1)
FRACTIONAL SHORTENING: 62.3 % (ref 28–44)
INTERVENTRICULAR SEPTUM: 0.8 CM (ref 0.6–1.1)
IVC DIAMETER: 1.71 CM
LEFT ATRIUM AREA SYSTOLIC (APICAL 2 CHAMBER): 25.59 CM2
LEFT ATRIUM AREA SYSTOLIC (APICAL 4 CHAMBER): 29.93 CM2
LEFT ATRIUM VOLUME MOD: 85.36 ML
LEFT INTERNAL DIMENSION IN SYSTOLE: 2 CM (ref 2.1–4)
LEFT VENTRICLE DIASTOLIC VOLUME: 133.24 ML
LEFT VENTRICLE END SYSTOLIC VOLUME APICAL 2 CHAMBER: 72.91 ML
LEFT VENTRICLE END SYSTOLIC VOLUME APICAL 4 CHAMBER: 93.95 ML
LEFT VENTRICLE SYSTOLIC VOLUME: 12.96 ML
LEFT VENTRICULAR INTERNAL DIMENSION IN DIASTOLE: 5.3 CM (ref 3.5–6)
LEFT VENTRICULAR MASS: 150.1 G
LV LATERAL E/E' RATIO: 5.31 M/S
LV SEPTAL E/E' RATIO: 6.9 M/S
LVED V (TEICH): 133.24 ML
LVES V (TEICH): 12.96 ML
LVOT MG: 3.23 MMHG
LVOT MV: 0.83 CM/S
MV PEAK A VEL: 0.83 M/S
MV PEAK E VEL: 0.69 M/S
MV STENOSIS PRESSURE HALF TIME: 61.83 MS
MV VALVE AREA P 1/2 METHOD: 3.56 CM2
OHS CV RV/LV RATIO: 0.66 CM
PISA TR MAX VEL: 3.56 M/S
PV PEAK GRADIENT: 8 MMHG
PV PEAK VELOCITY: 1.37 M/S
RA VOL SYS: 47.93 ML
RIGHT ATRIAL AREA: 17.5 CM2
RIGHT ATRIUM VOLUME AREA LENGTH APICAL 4 CHAMBER: 46.42 ML
RIGHT VENTRICLE DIASTOLIC BASEL DIMENSION: 3.5 CM
RIGHT VENTRICLE DIASTOLIC LENGTH: 7.3 CM
RIGHT VENTRICLE DIASTOLIC MID DIMENSION: 1.7 CM
RIGHT VENTRICULAR LENGTH IN DIASTOLE (APICAL 4-CHAMBER VIEW): 7.32 CM
RV MID DIAMA: 1.7 CM
TDI LATERAL: 0.13 M/S
TDI SEPTAL: 0.1 M/S
TDI: 0.12 M/S
TR MAX PG: 51 MMHG

## 2024-12-05 PROCEDURE — 93306 TTE W/DOPPLER COMPLETE: CPT

## 2024-12-17 LAB
OHS QRS DURATION: 96 MS
OHS QTC CALCULATION: 455 MS

## 2025-01-08 ENCOUNTER — TELEPHONE (OUTPATIENT)
Dept: DERMATOLOGY | Facility: CLINIC | Age: 60
End: 2025-01-08
Payer: COMMERCIAL

## 2025-01-08 NOTE — TELEPHONE ENCOUNTER
Attempted to return call, LUCIA for pt to call back     ----- Message from Windy sent at 1/8/2025  1:15 PM CST -----  Pt [phone], Pt would like to speak with someone about the side effects of Dupixent.

## 2025-01-08 NOTE — TELEPHONE ENCOUNTER
Pt returned call, stating he has been having joint pain and muscle spasms for the last 6 weeks that was 1 to 2 times weekly and now progressed to around 3 times a day. Pt states he is due for his injection tonight. Spoke with Dr. Santiago and she is going to start a medication to help with the joint pain/ muscle spasms so he can continue dupixent. Medication can be sent to [facility].

## 2025-01-10 RX ORDER — FLUTICASONE PROPIONATE 50 MCG
1 SPRAY, SUSPENSION (ML) NASAL DAILY
Qty: 16 G | Refills: 3 | Status: SHIPPED | OUTPATIENT
Start: 2025-01-10

## 2025-03-03 ENCOUNTER — OFFICE VISIT (OUTPATIENT)
Dept: NEUROLOGY | Facility: CLINIC | Age: 60
End: 2025-03-03
Payer: COMMERCIAL

## 2025-03-03 VITALS
HEIGHT: 69 IN | BODY MASS INDEX: 33.59 KG/M2 | OXYGEN SATURATION: 96 % | SYSTOLIC BLOOD PRESSURE: 128 MMHG | WEIGHT: 226.81 LBS | HEART RATE: 80 BPM | DIASTOLIC BLOOD PRESSURE: 78 MMHG

## 2025-03-03 DIAGNOSIS — M43.16 SPONDYLOLISTHESIS, LUMBAR REGION: ICD-10-CM

## 2025-03-03 DIAGNOSIS — G89.4 CHRONIC PAIN SYNDROME: Primary | ICD-10-CM

## 2025-03-03 PROCEDURE — 3074F SYST BP LT 130 MM HG: CPT | Mod: ,,, | Performed by: NURSE PRACTITIONER

## 2025-03-03 PROCEDURE — 99999 PR PBB SHADOW E&M-EST. PATIENT-LVL V: CPT | Mod: PBBFAC,,, | Performed by: NURSE PRACTITIONER

## 2025-03-03 PROCEDURE — 3078F DIAST BP <80 MM HG: CPT | Mod: ,,, | Performed by: NURSE PRACTITIONER

## 2025-03-03 PROCEDURE — 99215 OFFICE O/P EST HI 40 MIN: CPT | Mod: PBBFAC | Performed by: NURSE PRACTITIONER

## 2025-03-03 PROCEDURE — 1160F RVW MEDS BY RX/DR IN RCRD: CPT | Mod: ,,, | Performed by: NURSE PRACTITIONER

## 2025-03-03 PROCEDURE — 99212 OFFICE O/P EST SF 10 MIN: CPT | Mod: S$PBB,,, | Performed by: NURSE PRACTITIONER

## 2025-03-03 PROCEDURE — 3008F BODY MASS INDEX DOCD: CPT | Mod: ,,, | Performed by: NURSE PRACTITIONER

## 2025-03-03 PROCEDURE — 1159F MED LIST DOCD IN RCRD: CPT | Mod: ,,, | Performed by: NURSE PRACTITIONER

## 2025-03-03 RX ORDER — LISINOPRIL 10 MG/1
1 TABLET ORAL EVERY MORNING
COMMUNITY

## 2025-03-03 NOTE — PATIENT INSTRUCTIONS
Continue current medications as directed  Follow-up with Dr. Soto as directed  Will setup EMG/NCS of the lower ext in [place]

## 2025-03-03 NOTE — PROGRESS NOTES
Subjective:       Patient ID: Evin Mtz is a 59 y.o. male     Chief Complaint:    Chief Complaint   Patient presents with    Follow-up        Allergies:  Aspirin    Current Medications:    Outpatient Encounter Medications as of 3/3/2025   Medication Sig Dispense Refill    apixaban (ELIQUIS) 2.5 mg Tab Take 1 tablet by mouth in the morning and 1 tablet before bedtime. 60 tablet 3    ascorbic acid, vitamin C, (VITAMIN C) 1000 MG tablet Vitamin C 1000 MG Oral Tablet QTY: 0 tablet Days: 0 Refills: 0  Written: 01/03/23 Patient Instructions: qd      baclofen (LIORESAL) 10 MG tablet Take 1 tablet by mouth 3 times a day 270 tablet 1    clindamycin phosphate 1% (CLINDAGEL) 1 % gel Apply topically to affected area 2 (two) times daily. 60 g 3    cyclobenzaprine (FLEXERIL) 10 MG tablet TAKE ONE (1) TABLET  BY MOUTH THREE (3)  TIMES DAILY AS NEEDED FOR MUSCLE SPASMS. 30 tablet 2    dextroamphetamine-amphetamine (ADDERALL) 20 mg tablet TAKE 1 TABLET BY MOUTH ONCE IN THE MORNING, 1 TABLET AT NOON AND 1/2 TABLET AT 4 PM 75 tablet 0    dextroamphetamine-amphetamine (ADDERALL) 20 mg tablet Take one tablet by mouth in the morning, one tablet at noon, and one-half tablet at 4 in the evening 75 tablet 0    dextroamphetamine-amphetamine (ADDERALL) 20 mg tablet Take one tablet by mouth in the morning, one at noon, and one-half tablet by mouth at 4 in the evening 75 tablet 0    DULoxetine (CYMBALTA) 30 MG capsule Take 1 Capsule by mouth daily 30 capsule 6    esomeprazole (NEXIUM) 40 MG capsule TAKE ONE (1) CAPSULE BY MOUTH EACH MORNING 90 capsule 2    fluticasone propionate (FLONASE) 50 mcg/actuation nasal spray Inhale 1 spray in each nostril once daily 16 g 3    furosemide (LASIX) 40 MG tablet Take 1 tablet (40 mg total) by mouth once daily. 90 tablet 3    lisinopriL 10 MG tablet Take 1 tablet by mouth every morning.      oxyCODONE-acetaminophen (PERCOCET)  mg per tablet Take 1 tablet by mouth 4 (four) times daily as  needed for pain... May cause drowsiness 120 tablet 0    oxyCODONE-acetaminophen (PERCOCET)  mg per tablet Take 1 tablet by mouth 4 (four) times daily as needed. 120 tablet 0    oxyCODONE-acetaminophen (PERCOCET)  mg per tablet Take 1 tablet by mouth 4 (four) times daily as needed for pain... May cause drowsiness 120 tablet 0    oxyCODONE-acetaminophen (PERCOCET)  mg per tablet Take 1 tablet by mouth 4 (four) times daily as needed. 120 tablet 0    oxyCODONE-acetaminophen (PERCOCET)  mg per tablet Take 1 tablet by mouth 4 (four) times daily as needed for pain... May cause drowsiness 120 tablet 0    pregabalin (LYRICA) 150 MG capsule Take 1 capsule (150 mg total) by mouth 3 (three) times daily. 90 capsule 5    tadalafiL (CIALIS) 20 MG Tab TAKE ONE (1) TABLET (20 MG TOTAL) BY MOUTH DAILY AS NEEDED. 10 tablet 5    albuterol (VENTOLIN HFA) 90 mcg/actuation inhaler Inhale 2 puffs into the lungs every 6 (six) hours as needed for Wheezing. Rescue 8.5 g 3    [DISCONTINUED] desonide (DESOWEN) 0.05 % cream Apply topically. (Patient not taking: Reported on 3/3/2025)      [DISCONTINUED] dextroamphetamine-amphetamine (ADDERALL) 20 mg tablet TAKE 1 TABLET BY MOUTH IN THE MORNING,  1 TABLET AT NOON AND 1/2 TABLET AT 4 PM (Patient not taking: Reported on 11/21/2024) 75 tablet 0    [DISCONTINUED] DUPIXENT SYRINGE 300 mg/2 mL Syrg Starting on Day 15: Give 1 injection (300mg) subcutaneously every 2 weeks. (Patient not taking: Reported on 3/3/2025) 2 each 11    [DISCONTINUED] ergocalciferol, vitamin D2, (VITAMIN D ORAL) Take 300 mg by mouth. (Patient not taking: Reported on 3/3/2025)      [DISCONTINUED] mupirocin (BACTROBAN) 2 % ointment Apply to sore spots on arm three times daily (Patient not taking: Reported on 3/3/2025) 30 g 2    [DISCONTINUED] oxyCODONE-acetaminophen (PERCOCET)  mg per tablet take 1 tablet by mouth 4 times a day as needed (Patient not taking: Reported on 3/3/2025) 120 tablet 0     No  facility-administered encounter medications on file as of 3/3/2025.       History of Present Illness  60 y/o male followed in neurology for headaches    He reports he is here today for recheck on chronic leg pain    Initial evaluation was in August of 2024.    Has history of lymphedema, chronic pain followed by ANISHA Soto followed at [facility]    He reported that his headaches had resolved at time of initial visit since the time of the referral.  He did report they seemed to happen later in the day a band like presentation, but again it resolved.    He is already followed by Dr. Soto for chronic lower back pain, on norco, lyrica, cymbalta, Elavil (previously) and multiple muscle relaxer treatments for this.  Indicates he has had prior lumbar surgeries per Dr. Lindsey, 2021 had lumbar surgery per Dr. Mtz, and more recently in 2022 by neurosurgery in [place] ([facility] sports medicine) as well and that Dr. Blanco had done prior nerve blocks as well so this is likely long standing and not acute.  I have discussed with them that given chronic treatment in the past that did not feel adding additional medications is not option from neurology unless his pain treatment provider sees otherwise.  I would recommend an EMG/NCS of the lower ext.  He does have history of stents in the lower ext so too has poor circulation with the chronic lymphedema.  They would prefer [facility] so will see will see about setting this up.    I recommended no medications to add, he is already on numerous medications already, including the above mentioned as well as adderall    If recurrent of headaches recommend obtaining head imaging           Review of Systems  Review of Systems   Constitutional:  Negative for diaphoresis and fever.   HENT:  Negative for congestion, hearing loss and tinnitus.    Eyes:  Negative for blurred vision, double vision, photophobia, discharge and redness.   Respiratory:  Negative for cough and shortness of breath.     Cardiovascular:  Negative for chest pain.   Gastrointestinal:  Negative for abdominal pain, nausea and vomiting.   Musculoskeletal:  Positive for back pain. Negative for joint pain, myalgias and neck pain.   Skin:  Negative for itching and rash.   Neurological:  Positive for headaches. Negative for dizziness, tremors, sensory change, speech change, focal weakness, seizures, loss of consciousness and weakness.   Psychiatric/Behavioral:  Negative for depression, hallucinations and memory loss. The patient does not have insomnia.    All other systems reviewed and are negative.     Objective:     NEUROLOGICAL EXAMINATION:     MENTAL STATUS   Oriented to person, place, and time.   Attention: normal. Concentration: normal.   Speech: speech is normal   Level of consciousness: alert  Knowledge: good and consistent with education.   Normal comprehension.     CRANIAL NERVES     CN II   Visual fields full to confrontation.   Visual acuity: normal  Right visual field deficit: none  Left visual field deficit: none     CN III, IV, VI   Pupils are equal, round, and reactive to light.  Extraocular motions are normal.   Right pupil: Size: 3 mm. Shape: regular. Reactivity: brisk. Consensual response: intact. Accommodation: intact.   Left pupil: Size: 3 mm. Shape: regular. Reactivity: brisk. Consensual response: intact. Accommodation: intact.   CN III: no CN III palsy  CN VI: no CN VI palsy  Nystagmus: none   Diplopia: none  Upgaze: normal  Downgaze: normal  Conjugate gaze: present  Vestibulo-ocular reflex: present    CN V   Facial sensation intact.   Right facial sensation deficit: none  Left facial sensation deficit: none  Right corneal reflex: normal  Left corneal reflex: normal    CN VII   Facial expression full, symmetric.   Right facial weakness: none  Left facial weakness: none  Right taste: normal  Left taste: normal    CN VIII   CN VIII normal.   Hearing: intact    CN IX, X   CN IX normal.   CN X normal.   Palate:  symmetric    CN XI   CN XI normal.   Right sternocleidomastoid strength: normal  Left sternocleidomastoid strength: normal  Right trapezius strength: normal  Left trapezius strength: normal    CN XII   CN XII normal.   Tongue: not atrophic  Fasciculations: absent  Tongue deviation: none    MOTOR EXAM   Muscle bulk: normal  Overall muscle tone: normal  Right arm tone: normal  Left arm tone: normal  Right arm pronator drift: absent  Left arm pronator drift: absent  Right leg tone: normal  Left leg tone: normal    Strength   Right neck flexion: 5/5  Left neck flexion: 5/5  Right neck extension: 5/5  Left neck extension: 5/5  Right deltoid: 5/5  Left deltoid: 5/5  Right biceps: 5/5  Left biceps: 5/5  Right triceps: 5/5  Left triceps: 5/5  Right wrist flexion: 5/5  Left wrist flexion: 5/5  Right wrist extension: 5/5  Left wrist extension: 5/5  Right interossei: 5/5  Left interossei: 5/5  Right iliopsoas: 5/5  Left iliopsoas: 5/5  Right quadriceps: 5/5  Left quadriceps: 5/5  Right hamstrin/5  Left hamstrin/5  Right anterior tibial: 5/5  Left anterior tibial: 5/5  Right posterior tibial: 5/5  Left posterior tibial: 5/5  Right gastroc: 5/5  Left gastroc: 5/5    REFLEXES     Reflexes   Right brachioradialis: 2+  Left brachioradialis: 2+  Right biceps: 2+  Left biceps: 2+  Right triceps: 2+  Left triceps: 2+  Right patellar: 2+  Left patellar: 2+  Right achilles: 2+  Left achilles: 2+  Right plantar: normal  Left plantar: normal  Right Santamaria: absent  Left Santamaria: absent  Right ankle clonus: absent  Left ankle clonus: absent  Right pendular knee jerk: absent  Left pendular knee jerk: absent    SENSORY EXAM   Light touch normal.   Right arm light touch: normal  Left arm light touch: normal  Right leg light touch: normal  Left leg light touch: normal  Vibration normal.   Right arm vibration: normal  Left arm vibration: normal  Right leg vibration: normal  Left leg vibration: normal  Proprioception normal.   Right arm  proprioception: normal  Left arm proprioception: normal  Right leg proprioception: normal  Left leg proprioception: normal  Pinprick normal.   Right arm pinprick: normal  Left arm pinprick: normal  Right leg pinprick: normal  Left leg pinprick: normal  Graphesthesia: normal  Romberg: negative  Stereognosis: normal    GAIT AND COORDINATION     Gait  Gait: normal     Coordination   Finger to nose coordination: normal  Heel to shin coordination: normal  Tandem walking coordination: normal    Tremor   Resting tremor: absent  Intention tremor: absent  Action tremor: absent       Physical Exam  Vitals and nursing note reviewed.   Constitutional:       Appearance: Normal appearance.   HENT:      Head: Normocephalic.   Eyes:      Extraocular Movements: EOM normal.      Pupils: Pupils are equal, round, and reactive to light.   Cardiovascular:      Rate and Rhythm: Normal rate and regular rhythm.   Pulmonary:      Effort: Pulmonary effort is normal.   Musculoskeletal:         General: Normal range of motion.      Cervical back: Normal range of motion and neck supple.   Skin:     General: Skin is warm and dry.   Neurological:      General: No focal deficit present.      Mental Status: He is alert and oriented to person, place, and time.      Cranial Nerves: No cranial nerve deficit.      Sensory: No sensory deficit.      Motor: No weakness.      Coordination: Coordination normal. Finger-Nose-Finger Test, Heel to Shin Test and Romberg Test normal.      Gait: Gait is intact. Gait and tandem walk normal.      Deep Tendon Reflexes: Reflexes normal.      Reflex Scores:       Tricep reflexes are 2+ on the right side and 2+ on the left side.       Bicep reflexes are 2+ on the right side and 2+ on the left side.       Brachioradialis reflexes are 2+ on the right side and 2+ on the left side.       Patellar reflexes are 2+ on the right side and 2+ on the left side.       Achilles reflexes are 2+ on the right side and 2+ on the left  side.  Psychiatric:         Mood and Affect: Mood normal.         Speech: Speech normal.         Behavior: Behavior normal.          Assessment:     Problem List Items Addressed This Visit          Neuro    Chronic pain syndrome - Primary       Orthopedic    Spondylolisthesis, lumbar region    Relevant Orders    EMG W/ ULTRASOUND AND NERVE CONDUCTION TEST 2 Extremities          Primary Diagnosis and ICD10  Chronic pain syndrome [G89.4]    Plan:     Patient Instructions   Continue current medications as directed  Follow-up with Dr. Soto as directed  Will setup EMG/NCS of the lower ext in [place]    Medications Discontinued During This Encounter   Medication Reason    desonide (DESOWEN) 0.05 % cream     dextroamphetamine-amphetamine (ADDERALL) 20 mg tablet     DUPIXENT SYRINGE 300 mg/2 mL Syrg     ergocalciferol, vitamin D2, (VITAMIN D ORAL)     mupirocin (BACTROBAN) 2 % ointment     oxyCODONE-acetaminophen (PERCOCET)  mg per tablet        Requested Prescriptions      No prescriptions requested or ordered in this encounter       Orders Placed This Encounter   Procedures    EMG W/ ULTRASOUND AND NERVE CONDUCTION TEST 2 Extremities

## 2025-03-10 DIAGNOSIS — L30.8 ASTEATOTIC ECZEMA: Primary | ICD-10-CM

## 2025-03-10 RX ORDER — DESONIDE 0.5 MG/G
CREAM TOPICAL 2 TIMES DAILY
Qty: 60 G | Refills: 5 | Status: SHIPPED | OUTPATIENT
Start: 2025-03-10

## 2025-03-10 NOTE — TELEPHONE ENCOUNTER
----- Message from Romy sent at 3/10/2025  1:02 PM CDT -----  Regarding: pt requests refill on desonide cream 0.05% to be sent in to [facility]  Who Called: Evin Mena is requesting assistance/information from provider's office.Preferred Method of Contact: Phone CallPatient's Preferred Phone Number on File: [phone] Best Call Back Number, if different:Additional Information: pt requests refill on desonide cream 0.05% to be sent in to [facility]

## 2025-03-13 ENCOUNTER — TELEPHONE (OUTPATIENT)
Dept: NEUROLOGY | Facility: CLINIC | Age: 60
End: 2025-03-13
Payer: COMMERCIAL

## 2025-03-13 NOTE — TELEPHONE ENCOUNTER
Called pt gave him EMG NC results told him it showed lumbar radiculopathy an d mild neuropathy per SIGIFREDO Belcher NP. Also told him the report was faxed to Dr Davis and told him SIGIFREDO Belcher NP said that Dr Michelle has him on the right medication already for this. He v/u.

## 2025-04-14 ENCOUNTER — CLINICAL SUPPORT (OUTPATIENT)
Dept: INTERNAL MEDICINE | Facility: CLINIC | Age: 60
End: 2025-04-14
Payer: COMMERCIAL

## 2025-04-14 ENCOUNTER — OFFICE VISIT (OUTPATIENT)
Dept: DERMATOLOGY | Facility: CLINIC | Age: 60
End: 2025-04-14
Payer: COMMERCIAL

## 2025-04-14 DIAGNOSIS — Z79.899 HIGH RISK MEDICATION USE: ICD-10-CM

## 2025-04-14 DIAGNOSIS — L08.9 SKIN INFECTION: ICD-10-CM

## 2025-04-14 DIAGNOSIS — L28.1 PRURIGO NODULARIS: Primary | ICD-10-CM

## 2025-04-14 DIAGNOSIS — L28.1 PRURIGO NODULARIS: ICD-10-CM

## 2025-04-14 PROCEDURE — 87077 CULTURE AEROBIC IDENTIFY: CPT | Mod: ,,, | Performed by: CLINICAL MEDICAL LABORATORY

## 2025-04-14 PROCEDURE — 87070 CULTURE OTHR SPECIMN AEROBIC: CPT | Mod: ,,, | Performed by: CLINICAL MEDICAL LABORATORY

## 2025-04-14 PROCEDURE — 87186 SC STD MICRODIL/AGAR DIL: CPT | Mod: ,,, | Performed by: CLINICAL MEDICAL LABORATORY

## 2025-04-14 RX ORDER — NEMOLIZUMAB-ILTO 30 MG/100MG
60 INJECTION, POWDER, LYOPHILIZED, FOR SOLUTION SUBCUTANEOUS
Qty: 2 EACH | Refills: 11 | Status: ACTIVE | OUTPATIENT
Start: 2025-04-14

## 2025-04-14 NOTE — PROGRESS NOTES
[facility]   Chanell Santiago MD    Patient Name: Evin Mtz  Patient YOB: 1965   Date of Service: 4/14/25    CC: Follow-up Prurigo Nodularis    HPI: Evin Mtz is a 59 y.o. male here today for follow-up of PN, last seen 02/24.  Previous treatments include Dupixent.  Overall, the PN is worse.  Treatment plan was followed as directed.    Past Medical History:   Diagnosis Date    GERD (gastroesophageal reflux disease)     Hypertension     Lymphedema      Past Surgical History:   Procedure Laterality Date    ANTERIOR LUMBAR INTERBODY FUSION (ALIF) N/A 10/5/2021    Procedure: L4-L5 anterior lumbar interbody fusion;  Surgeon: Jayden Mtz MD;  Location: [facility] OR;  Service: Neurosurgery;  Laterality: N/A;    ILIAC ARTERY STENT      JOINT REPLACEMENT Right     tka    LAPAROSCOPIC REPAIR OF HIATAL HERNIA      ROBOTIC FUSION,SPINE,LUMBAR,TLIF N/A 11/10/2022    Procedure: ROBOTIC FUSION,SPINE,LUMBAR,TLIF; [facility] OR 3A, 11/10/22 first case; Procedure: L3-S1 decompression and fusion, L5-S1 TLIF; Instrumentation: Medtronic; Marino table, Equipment: C arm, O arm, Robot; Neuro-Monitoring: Yes (EMG, SSEP, MEP);  Surgeon: Seb Banegas MD;  Location: [facility];  Service: Neurosurgery;  Laterality: N/A;    SINUS SURGERY       Review of patient's allergies indicates:   Allergen Reactions    Aspirin Other (See Comments)     Gi symptoms-burning    Upsets his stomach     Current Medications[1]    ROS: A focused review of systems was obtained and negative.     Exam: A focused skin exam was performed. All areas examined were normal except as mentioned in the assessment and plan below.  General Appearance of the patient is well developed and well nourished.  Orientation: alert and oriented x 3.  Mood and affect: pleasant.    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 Diagnoses of Skin infection and High risk medication use were also pertinent to this visit.    Plan:         Prurigo Nodularis  - erythematous nodules with central erosions located on the bilateral arms  Severity: severe   Estimated nodule count: 20  Status: Inadequately controlled      Plan: Counseling  I counseled the patient regarding the following:  Skin care: Recommend trimming nails short, anti-itch lotions such as Sarna, topical steroids and antihistamines.  Expectations: Prurigo Nodularis is a self-inflicted lesion that results from picking or rubbing the same spot of skin over and over again. If the itch-scratch cycle is broken, the lesions will resolve.  Contact office if: Prurigo Nodularis worsens, or if itching is accompanied by constitutional symptoms.    - Will start Nemluvio, as patient has stopped Dupixent r/t muscle pain and weakness that has resolved with stopping Dupixent      High Risk Medication Monitoring (Z79.899) : The risks and benefits of the medication were reviewed in full with the patient. Should any side effects occur, the patient will stop the medication and contact me immediately.    Counseling: I counseled pt that nemolizumab is a Interleukin-31 receptor alpha antagonist.  It is an injection.  Risk include hypersensitivity reaction.  Live vaccines should be avoided while on nemolizumab.      Skin Infection, NOS   - crusting of PN    Plan: Counseling  I counseled the patient regarding the following:  Skin care: Patients with purulence or fluid collections should have their wounds re-opened, drained, cultured and irrigated. All wound infections should be treated with antibiotics.  Expectations: Wound Infections usually occur 4-7 days postoperatively. Patients exhibit, pain, rednes, swelling, cellulitic changes and fever.  Contact Office if: Wound Infection fails to respond to treatment or worsens, patient develops a fever, or if redness spreads despite antibiotics.    A bacterial culture was obtained from the bilateral arms      Follow up in about 3 months (around 7/14/2025) for  PN.    Chanell Santiago MD           [1]   Current Outpatient Medications:     albuterol (VENTOLIN HFA) 90 mcg/actuation inhaler, Inhale 2 puffs into the lungs every 6 (six) hours as needed for Wheezing. Rescue, Disp: 8.5 g, Rfl: 3    apixaban (ELIQUIS) 2.5 mg Tab, Take 1 tablet by mouth in the morning and 1 tablet before bedtime., Disp: 60 tablet, Rfl: 3    ascorbic acid, vitamin C, (VITAMIN C) 1000 MG tablet, Vitamin C 1000 MG Oral Tablet QTY: 0 tablet Days: 0 Refills: 0  Written: 01/03/23 Patient Instructions: qd, Disp: , Rfl:     baclofen (LIORESAL) 10 MG tablet, Take 1 tablet by mouth 3 times a day, Disp: 270 tablet, Rfl: 1    clindamycin phosphate 1% (CLINDAGEL) 1 % gel, Apply topically to affected area 2 (two) times daily., Disp: 60 g, Rfl: 3    cyclobenzaprine (FLEXERIL) 10 MG tablet, TAKE ONE (1) TABLET  BY MOUTH THREE (3)  TIMES DAILY AS NEEDED FOR MUSCLE SPASMS., Disp: 30 tablet, Rfl: 2    desonide (DESOWEN) 0.05 % cream, Apply topically 2 (two) times a day tapering with improvement, Disp: 60 g, Rfl: 5    dextroamphetamine-amphetamine (ADDERALL) 20 mg tablet, TAKE 1 TABLET BY MOUTH ONCE IN THE MORNING, 1 TABLET AT NOON AND 1/2 TABLET AT 4 PM, Disp: 75 tablet, Rfl: 0    dextroamphetamine-amphetamine (ADDERALL) 20 mg tablet, Take one tablet by mouth in the morning, one tablet at noon, and one-half tablet at 4 in the evening, Disp: 75 tablet, Rfl: 0    dextroamphetamine-amphetamine (ADDERALL) 20 mg tablet, Take 1 tablet by mouth in morning, 1 tablet at noon, and 1/2 tablet at 4PM, Disp: 75 tablet, Rfl: 0    dextroamphetamine-amphetamine (ADDERALL) 20 mg tablet, Take one tablet by mouth in the morning, one at noon, and one-half tablet at 4 in the evening, Disp: 75 tablet, Rfl: 0    [START ON 5/2/2025] dextroamphetamine-amphetamine (ADDERALL) 20 mg tablet, Take 20mg in AM, 20mg at Noon, and 20mg 1/2 tablet at 4PM, Disp: 75 tablet, Rfl: 0    diclofenac sodium (SOLARAZE) 3 % gel, Apply 2 grams topically to the  affected area three times a day as needed for 30 day(s), Disp: 180 g, Rfl: 5    DULoxetine (CYMBALTA) 30 MG capsule, Take 1 Capsule by mouth daily, Disp: 30 capsule, Rfl: 6    esomeprazole (NEXIUM) 40 MG capsule, TAKE ONE (1) CAPSULE BY MOUTH EACH MORNING, Disp: 90 capsule, Rfl: 2    fluticasone propionate (FLONASE) 50 mcg/actuation nasal spray, Inhale 1 spray in each nostril once daily, Disp: 16 g, Rfl: 3    furosemide (LASIX) 40 MG tablet, Take 1 tablet (40 mg total) by mouth once daily., Disp: 90 tablet, Rfl: 3    lisinopriL 10 MG tablet, Take 1 tablet by mouth every morning., Disp: , Rfl:     oxyCODONE-acetaminophen (PERCOCET)  mg per tablet, Take 1 tablet by mouth 4 (four) times daily as needed for pain... May cause drowsiness, Disp: 120 tablet, Rfl: 0    oxyCODONE-acetaminophen (PERCOCET)  mg per tablet, Take 1 tablet by mouth 4 (four) times daily as needed., Disp: 120 tablet, Rfl: 0    oxyCODONE-acetaminophen (PERCOCET)  mg per tablet, Take 1 tablet by mouth 4 (four) times daily as needed for pain... May cause drowsiness, Disp: 120 tablet, Rfl: 0    oxyCODONE-acetaminophen (PERCOCET)  mg per tablet, Take 1 tablet by mouth 4 (four) times daily as needed., Disp: 120 tablet, Rfl: 0    oxyCODONE-acetaminophen (PERCOCET)  mg per tablet, Take 1 tablet by mouth 4 (four) times daily as needed for pain... May cause drowsiness, Disp: 120 tablet, Rfl: 0    oxyCODONE-acetaminophen (PERCOCET)  mg per tablet, Take 1 tablet by mouth 4 (four) times daily as needed., Disp: 120 tablet, Rfl: 0    pregabalin (LYRICA) 150 MG capsule, Take 1 capsule (150 mg total) by mouth 3 (three) times daily., Disp: 90 capsule, Rfl: 5    tadalafiL (CIALIS) 20 MG Tab, TAKE ONE (1) TABLET (20 MG TOTAL) BY MOUTH DAILY AS NEEDED., Disp: 10 tablet, Rfl: 5

## 2025-04-14 NOTE — PROGRESS NOTES
Comprehensive Medication Management     Initial NEMLUVIO consult completed on 25. NEMLUVIO prescription has been sent to [facility] on 2025 . Confirmed 2 patient identifiers - name and . Therapy Appropriate.    Chief Complaint  Evin Mtz  is a 59 y.o. year-old male which presents for pharmacist management of NEMLUVIO for prurigo nodularis and to review medications.    History and Present Illness  Consulted by Dr. Santiago to provide information and counseling on Nemluvio to this 58y/o male.  Pt's with a current diagnosis of prurigo nodularis and plan is to begin Nemluvio treatment for prurigo nodularis.  Pt has tried and failed dupixent which caused severe muscle aches in the pt and did not relief symtpoms.      Allergies include:   Review of patient's allergies indicates:   Allergen Reactions    Aspirin Other (See Comments)     Gi symptoms-burning    Upsets his stomach        Medication failures:  dupixent and topical treatment     Objective    Wt Readings from Last 3 Encounters:   25 102.9 kg (226 lb 12.8 oz)   24 105.7 kg (233 lb)   24 106.5 kg (234 lb 12.8 oz)     Temp Readings from Last 3 Encounters:   24 97.9 °F (36.6 °C) (Temporal)   10/24/24 97 °F (36.1 °C)   24 98.1 °F (36.7 °C)     BP Readings from Last 3 Encounters:   25 128/78   24 130/60   24 118/70     Pulse Readings from Last 3 Encounters:   25 80   24 85   24 63            Problem List  Problem List[1]   Prurigo nodularis      Medication counseling   Adherence:keep a calendar to track dosing and avoid missing any dose or giving late unless instructed by provider    Counseled patient on medication storage:   Store syringes in the refrigerator.  Keep in original carton to protect from light.   Do not heat, freeze, or shake the syringe.      Counseled patient on administration directions for Nemluvio:  Initial dosing: Inject 60 mg (2 syringes of 30 mg) into  the skin once for a loading dose, then 60 mg (2 syringes of 30 mg) every 28 days  Take out of the refrigerator 45 minutes prior to injection.  Wash hands before and after injection.  Patient may inject in either the tops of thighs or lower abdomen- but at least 2 inches away from the belly button, or the outer or back part of his/ her upper arm (if a caregiver administers).  Patient is to wipe down the injection site with the alcohol pad, wait to dry.  Insert the needle at a 45 degree angle straight into the skin.  Hold the syringe steady and slowly push down the plunger, then remove the needle.   Do not use Nemluvio if the syringe has been dropped on a hard surface or damaged.    Patient was counseled on possible side effects and contraindications:  Injection site reaction   Headache, joint/muscle aches  ER precautions advised for signs and symptoms of allergic reaction or if experiencing fast heartbeat, fever, swollen gland, feeling unwell, red or rough skin, dizziness or passing out, joint pain, upset stomach or throwing up, or stomach cramps   Do not take if any known            hypersensitivity to dupilumab or any            component of the formulation     Vaccinations:   It is not recommended to receive live vacinations while on Nemluvio. Examples of live vaccines include (not all-inclusive lists):  - Intranasal Influenza LAIV4 (FluMist Quadrivalent)  - Measles, Mumps, Rubella (MMR)  - Rotavirus (RotaTeq, Rotarix)  - Typhoid oral capsule (Vivotif)  - Varicella (Varivax)  - Smallpox (Vaccinia)  - Yellow Fever (YF-Vax)  - Adenovirus  - Cholera (Vaxchora)    Pharmacy and Insurance   [facility]  [employer] [place]    Assessment and Plan:    Nemluvio for the treatment of prurigo nodularis.  Nemluvio dosing includes 60 mg SubQ (given as two 30 mg injections), followed by 60 mg every 4 weeks.  Prescription for Nemluvio sent to [facility].  Current home medication list was reviewed for  interactions and contraindications.        Patient did not have any further questions. Evin Mtz  was advised to keep a calendar to stay compliant. Consultation included: indication; goals of treatment which include disease state improvement along with symptom improvement; administration; storage and handling; side effects; how to handle side effects; the importance of compliance; how to handle missed doses; the importance of laboratory monitoring; the importance of keeping all follow up appointments. Patient understands to report any medication changes to [facility] and provider. All questions answered and addressed to patients satisfaction.    Pooja Gates, Pharm. D.        [1]   Patient Active Problem List  Diagnosis    Arthritis of left hip    Arthritis of right hip    Gastroesophageal reflux disease    Essential hypertension    Venous insufficiency    Spondylolisthesis, lumbar region    S/P lumbar fusion    PVD (peripheral vascular disease)    Chronic pain syndrome    Impingement syndrome of right shoulder    Frequent headaches    Acute non intractable tension-type headache    Scalp laceration, initial encounter    Lymphedema

## 2025-04-16 RX ORDER — ESOMEPRAZOLE MAGNESIUM 40 MG/1
40 CAPSULE, DELAYED RELEASE ORAL EVERY MORNING
Qty: 90 CAPSULE | Refills: 2 | Status: SHIPPED | OUTPATIENT
Start: 2025-04-16

## 2025-04-17 ENCOUNTER — TELEPHONE (OUTPATIENT)
Dept: DERMATOLOGY | Facility: CLINIC | Age: 60
End: 2025-04-17
Payer: COMMERCIAL

## 2025-04-17 ENCOUNTER — RESULTS FOLLOW-UP (OUTPATIENT)
Dept: DERMATOLOGY | Facility: CLINIC | Age: 60
End: 2025-04-17
Payer: COMMERCIAL

## 2025-04-17 DIAGNOSIS — L08.9 SKIN INFECTION: ICD-10-CM

## 2025-04-17 LAB — MICROORGANISM SPEC CULT: ABNORMAL

## 2025-04-17 NOTE — TELEPHONE ENCOUNTER
Call back to patient with no answer. Left voicemail for call back.     ----- Message from Clair sent at 4/17/2025 12:11 PM CDT -----  Regarding: RETURN CALL  Who Called: Evin MtzPatient is returning phone callWho Left Message for Patient:Debbie the patient know what this is regarding?:NOPreferred Method of Contact: Phone CallPatient's Preferred Phone Number on File: [phone] Best Call Back Number, if different:Additional Information:

## 2025-04-21 ENCOUNTER — HOSPITAL ENCOUNTER (OUTPATIENT)
Dept: RADIOLOGY | Facility: HOSPITAL | Age: 60
Discharge: HOME | End: 2025-04-21
Attending: ORTHOPAEDIC SURGERY
Payer: COMMERCIAL

## 2025-04-21 ENCOUNTER — OFFICE VISIT (OUTPATIENT)
Dept: ORTHOPEDICS | Facility: CLINIC | Age: 60
End: 2025-04-21
Payer: COMMERCIAL

## 2025-04-21 VITALS — BODY MASS INDEX: 32.83 KG/M2 | HEIGHT: 69 IN | WEIGHT: 221.63 LBS

## 2025-04-21 DIAGNOSIS — M25.562 LEFT KNEE PAIN, UNSPECIFIED CHRONICITY: ICD-10-CM

## 2025-04-21 DIAGNOSIS — M16.11 ARTHRITIS OF RIGHT HIP: Primary | ICD-10-CM

## 2025-04-21 DIAGNOSIS — M16.12 ARTHRITIS OF LEFT HIP: ICD-10-CM

## 2025-04-21 PROCEDURE — 73564 X-RAY EXAM KNEE 4 OR MORE: CPT | Mod: 26,LT,, | Performed by: ORTHOPAEDIC SURGERY

## 2025-04-21 PROCEDURE — 73502 X-RAY EXAM HIP UNI 2-3 VIEWS: CPT | Mod: TC,LT

## 2025-04-21 PROCEDURE — 99213 OFFICE O/P EST LOW 20 MIN: CPT | Mod: PBBFAC,25 | Performed by: ORTHOPAEDIC SURGERY

## 2025-04-21 PROCEDURE — 73502 X-RAY EXAM HIP UNI 2-3 VIEWS: CPT | Mod: 26,LT,, | Performed by: RADIOLOGY

## 2025-04-21 PROCEDURE — 99999 PR PBB SHADOW E&M-EST. PATIENT-LVL III: CPT | Mod: PBBFAC,,, | Performed by: ORTHOPAEDIC SURGERY

## 2025-04-21 PROCEDURE — 73564 X-RAY EXAM KNEE 4 OR MORE: CPT | Mod: TC,LT

## 2025-04-21 RX ORDER — DOXYCYCLINE 100 MG/1
100 CAPSULE ORAL 2 TIMES DAILY
Qty: 20 CAPSULE | Refills: 0 | Status: SHIPPED | OUTPATIENT
Start: 2025-04-21 | End: 2025-05-01

## 2025-04-21 NOTE — PROGRESS NOTES
Radiology Interpretation        Patient Name: Evin Mtz  Date: 4/21/2025  YOB: 1965  MRN# [id number]        ORDERING DIAGNOSIS:    Encounter Diagnoses   Name Primary?    Arthritis of right hip Yes    Arthritis of left hip     Left knee pain, unspecified chronicity            Four views left knee skeletally mature individual there is normal mineralization some degenerative changes noted of the medial compartment no fractures no subluxations small periarticular osteophytes impression degenerative changes more involved medial compartment left knee            Twan Serrano MD                     
Patient is here for bilateral hip pain in his left knee pain.  He has some mild degenerative changes of the left knee he has some tenderness of the posteromedial joint line maybe a meniscus tears biggest pain in his burning into the medial femoral condyle burning into his groin burning into his buttock he has severe degenerative changes bilateral hips left hips little more painful than right he has also had multiple fusions in his back and he is having a herniated disc that has pinching of the nerve and he is having sciatic pain down the left side.  At this time I think that has some injections we had helping.  He is not ready to have a total hip arthroplasty performed year we did discuss this option.  He is going to call in his pain treatment doctor about getting some injections.  I will check him back after he has had the injections.  Does move his toes does have some slight decreased sensation in his feet palpable dorsalis pedis pulse.    
no abdominal pain, no bloating, no constipation, no diarrhea, no nausea and no vomiting.

## 2025-04-28 ENCOUNTER — TELEPHONE (OUTPATIENT)
Dept: DERMATOLOGY | Facility: CLINIC | Age: 60
End: 2025-04-28
Payer: COMMERCIAL

## 2025-04-28 NOTE — TELEPHONE ENCOUNTER
Call to patient to notify that Dr. Santiago would like patient to try Nemluvio, but call our office at any signs of joint pain returning. Informed patient to call our office for injection training once he receives his medication. Verbalized understanding. Denies further questions or concerns at this time.     ----- Message -----  From: Vanessa Chavez  Sent: 4/24/2025   2:52 PM CDT  To: Jack [facility]ERRA Staff    Who Called: Zainab with [facility] Caller is requesting assistance/information from provider's office.Zainab is calling to let Dr. Santiago know they are working on the prescription nemolizumab-ilto (NEMLUVIO) 30 mg PnIj  for  the pt. Zainab states pt is worried about side effects of joint pain due to dupixent did that to him. Zainba states they do not need a call back she just wanted to let staff be aware of concerns from pt and said pt wanted to talk to nurse about  these concerns. Preferred Method of Contact: Phone CallPatient's Preferred Phone Number on File: [phone] Best Call Back Number, if different:Additional Information:

## 2025-05-01 DIAGNOSIS — L08.9 SKIN INFECTION: Primary | ICD-10-CM

## 2025-05-01 RX ORDER — CLINDAMYCIN PHOSPHATE 10 MG/G
GEL TOPICAL 2 TIMES DAILY
Qty: 60 G | Refills: 3 | Status: SHIPPED | OUTPATIENT
Start: 2025-05-01

## 2025-05-01 NOTE — TELEPHONE ENCOUNTER
Patient called stating that he gave himself his Criss injection. States he had no problems and experiencing no side effects of new medication. Request for refill on Clindamycin gel be sent to [facility].

## 2025-05-29 ENCOUNTER — OFFICE VISIT (OUTPATIENT)
Dept: DERMATOLOGY | Facility: CLINIC | Age: 60
End: 2025-05-29
Payer: COMMERCIAL

## 2025-05-29 ENCOUNTER — TELEPHONE (OUTPATIENT)
Dept: DERMATOLOGY | Facility: CLINIC | Age: 60
End: 2025-05-29
Payer: COMMERCIAL

## 2025-05-29 VITALS — BODY MASS INDEX: 32.78 KG/M2 | WEIGHT: 222 LBS

## 2025-05-29 DIAGNOSIS — L28.1 PRURIGO NODULARIS: ICD-10-CM

## 2025-05-29 DIAGNOSIS — L08.9 SKIN INFECTION: ICD-10-CM

## 2025-05-29 DIAGNOSIS — L30.9 DERMATITIS, UNSPECIFIED: Primary | ICD-10-CM

## 2025-05-29 PROCEDURE — 87070 CULTURE OTHR SPECIMN AEROBIC: CPT | Mod: ,,, | Performed by: CLINICAL MEDICAL LABORATORY

## 2025-05-29 NOTE — PATIENT INSTRUCTIONS
Biopsy Site Care  Starting tomorrow you may shower and wash the area with dial antibacterial soap  Pat dry and apply vaseline and a bandaid  Perform this routine for three days  The area will be irritated, sore, slightly red, and may itch, sting, or burn  Do not apply make-up to the area until it is healed  There will be a scar  The area will take 1-2 weeks to heal  No soaking in the tub or hot tub for one week

## 2025-05-29 NOTE — PROGRESS NOTES
[facility]   Chanell Santiago MD    Patient Name: Evin Mtz  Patient YOB: 1965   Date of Service: 5/29/25    CC: Rash    HPI: Evin Mtz is a 59 y.o. male here today for rash, located on the scalp and bilateral arms.  Rash has been present for 1 weeks.  Previous treatments include clindamycin gel.      Past Medical History:   Diagnosis Date    GERD (gastroesophageal reflux disease)     Hypertension     Lymphedema      Past Surgical History:   Procedure Laterality Date    ANTERIOR LUMBAR INTERBODY FUSION (ALIF) N/A 10/5/2021    Procedure: L4-L5 anterior lumbar interbody fusion;  Surgeon: Jayden Mtz MD;  Location: [facility] OR;  Service: Neurosurgery;  Laterality: N/A;    ILIAC ARTERY STENT      JOINT REPLACEMENT Right     tka    LAPAROSCOPIC REPAIR OF HIATAL HERNIA      ROBOTIC FUSION,SPINE,LUMBAR,TLIF N/A 11/10/2022    Procedure: ROBOTIC FUSION,SPINE,LUMBAR,TLIF; [facility] OR 3A, 11/10/22 first case; Procedure: L3-S1 decompression and fusion, L5-S1 TLIF; Instrumentation: Medtronic; Marino table, Equipment: C arm, O arm, Robot; Neuro-Monitoring: Yes (EMG, SSEP, MEP);  Surgeon: Seb Banegas MD;  Location: [facility] OR;  Service: Neurosurgery;  Laterality: N/A;    SINUS SURGERY       Review of patient's allergies indicates:   Allergen Reactions    Aspirin Other (See Comments)     Gi symptoms-burning    Upsets his stomach     Current Medications[1]    ROS: A focused review of systems was obtained and negative.     Exam: A focused skin exam was performed. All areas examined were normal except as mentioned in the assessment and plan below.  General Appearance of the patient is well developed and well nourished.  Orientation: alert and oriented x 3.  Mood and affect: pleasant.    Assessment:   The primary encounter diagnosis was Dermatitis, unspecified. A diagnosis of Skin infection was also pertinent to this visit.    Plan:        Skin Infection, NOS   - crusting of primary rash  per below    Plan: Counseling  I counseled the patient regarding the following:  Skin care: Patients with purulence or fluid collections should have their wounds re-opened, drained, cultured and irrigated. All wound infections should be treated with antibiotics.  Expectations: Wound Infections usually occur 4-7 days postoperatively. Patients exhibit, pain, rednes, swelling, cellulitic changes and fever.  Contact Office if: Wound Infection fails to respond to treatment or worsens, patient develops a fever, or if redness spreads despite antibiotics.    A bacterial culture was obtained from the scalp and bilateral arms.     Dermatitis Unspecified  - scattered erythematous papules on the arms  DDx: drug eruption vs photosensitive eruption vs eczema    Plan: Counseling  I counseled the patient regarding the following:  Skin care: Patient instructed to use gentle skin care including dove unscented soap, CeraVe moisturizing cream, and fragrance free laundry detergent.  Expectations: The patient understands that there is not a definitive diagnosis at this time. Further testing or empiric therapy may be necessary to diagnose and improve the condition.  Contact office if: The patient develops a fever, or rash dramatically worsens despite treatment.    Plan: Biopsy by Punch Method  Location (A): Right arm.   Accession Number: n/a  Written consent was obtained and risks were reviewed including but not limited to scarring, infection, bleeding, scabbing, incomplete removal, nerve damage and allergy to anesthesia. The area was prepped with Chloraprep. Local anesthesia was obtained with approximately 0.5cc of 1% lidocaine with epinephrine. A 4mm punch biopsy (sent for h and e ) was performed on the above listed location. Epidermal closure was achieved with 4-0 Ethilon. Following the biopsy Petrolatum and a bandage were applied. Patient will be notified of biopsy results. However, patient instructed to call the office if not  contacted within 2 weeks. Suture removal in 12 days.    Prurigo Nodularis  - erythematous nodules with central erosions located on the forearms  Severity: severe  Estimated nodule count: 20  Status: Inadequately controlled     Plan: Counseling  I counseled the patient regarding the following:  Skin care: Recommend trimming nails short, anti-itch lotions such as Sarna, topical steroids and antihistamines.  Expectations: Prurigo Nodularis is a self-inflicted lesion that results from picking or rubbing the same spot of skin over and over again. If the itch-scratch cycle is broken, the lesions will resolve.  Contact office if: Prurigo Nodularis worsens, or if itching is accompanied by constitutional symptoms.    - recently switched to nemluvio as pt developed arthropathy on dupixent   - will biopsy rash to r/o drug eruption    Follow up in about 12 days (around 6/10/2025) for SR/Results .    Chanell Santiago MD           [1]   Current Outpatient Medications:     albuterol (VENTOLIN HFA) 90 mcg/actuation inhaler, Inhale 2 puffs into the lungs every 6 (six) hours as needed for Wheezing. Rescue, Disp: 8.5 g, Rfl: 3    apixaban (ELIQUIS) 2.5 mg Tab, Take 1 tablet by mouth in the morning and 1 tablet before bedtime., Disp: 60 tablet, Rfl: 3    ascorbic acid, vitamin C, (VITAMIN C) 1000 MG tablet, Vitamin C 1000 MG Oral Tablet QTY: 0 tablet Days: 0 Refills: 0  Written: 01/03/23 Patient Instructions: qd, Disp: , Rfl:     baclofen (LIORESAL) 10 MG tablet, Take 1 tablet by mouth 3 times a day, Disp: 270 tablet, Rfl: 1    clindamycin phosphate 1% 1 % gel, Apply topically to affected area 2 (two) times daily., Disp: 60 g, Rfl: 3    cyclobenzaprine (FLEXERIL) 10 MG tablet, TAKE ONE (1) TABLET  BY MOUTH THREE (3)  TIMES DAILY AS NEEDED FOR MUSCLE SPASMS., Disp: 30 tablet, Rfl: 2    desonide (DESOWEN) 0.05 % cream, Apply topically 2 (two) times a day tapering with improvement, Disp: 60 g, Rfl: 5    dextroamphetamine-amphetamine (ADDERALL)  20 mg tablet, TAKE 1 TABLET BY MOUTH ONCE IN THE MORNING, 1 TABLET AT NOON AND 1/2 TABLET AT 4 PM, Disp: 75 tablet, Rfl: 0    dextroamphetamine-amphetamine (ADDERALL) 20 mg tablet, Take one tablet by mouth in the morning, one tablet at noon, and one-half tablet at 4 in the evening (Patient not taking: Reported on 4/21/2025), Disp: 75 tablet, Rfl: 0    dextroamphetamine-amphetamine (ADDERALL) 20 mg tablet, Take 1 tablet by mouth in morning, 1 tablet at noon, and 1/2 tablet at 4PM (Patient not taking: Reported on 4/21/2025), Disp: 75 tablet, Rfl: 0    dextroamphetamine-amphetamine (ADDERALL) 20 mg tablet, Take one tablet by mouth in the morning, one at noon, and one-half tablet at 4 in the evening (Patient not taking: Reported on 4/21/2025), Disp: 75 tablet, Rfl: 0    dextroamphetamine-amphetamine (ADDERALL) 20 mg tablet, Take 20mg in AM, 20mg at Noon, and 20mg 1/2 tablet at 4PM (Patient not taking: Reported on 4/21/2025), Disp: 75 tablet, Rfl: 0    [START ON 6/6/2025] dextroamphetamine-amphetamine (ADDERALL) 20 mg tablet, Take one tablet by mouth in the morning and noon, and one-half tablet at four in the evening, Disp: 75 tablet, Rfl: 0    dextroamphetamine-amphetamine (ADDERALL) 20 mg tablet, Take one tablet by mouth in the morning and noon, and one-half tablet at four in the evening, Disp: 75 tablet, Rfl: 0    [START ON 7/3/2025] dextroamphetamine-amphetamine (ADDERALL) 20 mg tablet, Take one tablet by mouth in the morning and noon, and one-half tablet at four in the evening, Disp: 75 tablet, Rfl: 0    diclofenac sodium (SOLARAZE) 3 % gel, Apply 2 grams topically to the affected area three times a day as needed for 30 day(s), Disp: 180 g, Rfl: 5    DULoxetine (CYMBALTA) 30 MG capsule, Take 1 Capsule by mouth daily, Disp: 30 capsule, Rfl: 6    DULoxetine (CYMBALTA) 30 MG capsule, Take 1 capsule (30 mg total) by mouth once daily., Disp: 30 capsule, Rfl: 6    esomeprazole (NEXIUM) 40 MG capsule, Take 1 capsule (40 mg  total) by mouth every morning., Disp: 90 capsule, Rfl: 2    fluticasone propionate (FLONASE) 50 mcg/actuation nasal spray, Inhale 1 spray in each nostril once daily, Disp: 16 g, Rfl: 3    furosemide (LASIX) 40 MG tablet, Take 1 tablet (40 mg total) by mouth once daily. (Patient not taking: Reported on 4/21/2025), Disp: 90 tablet, Rfl: 3    lisinopriL 10 MG tablet, Take 1 tablet by mouth every morning. (Patient not taking: Reported on 4/21/2025), Disp: , Rfl:     nemolizumab-ilto (NEMLUVIO) 30 mg PnIj, Inject 60 mg into the skin every 28 days., Disp: 2 each, Rfl: 11    oxyCODONE-acetaminophen (PERCOCET)  mg per tablet, Take 1 tablet by mouth 4 (four) times daily as needed for pain... May cause drowsiness, Disp: 120 tablet, Rfl: 0    oxyCODONE-acetaminophen (PERCOCET)  mg per tablet, Take 1 tablet by mouth 4 (four) times daily as needed. (Patient not taking: Reported on 4/21/2025), Disp: 120 tablet, Rfl: 0    oxyCODONE-acetaminophen (PERCOCET)  mg per tablet, Take 1 tablet by mouth 4 (four) times daily as needed for pain... May cause drowsiness (Patient not taking: Reported on 4/21/2025), Disp: 120 tablet, Rfl: 0    oxyCODONE-acetaminophen (PERCOCET)  mg per tablet, Take 1 tablet by mouth 4 (four) times daily as needed. (Patient not taking: Reported on 4/21/2025), Disp: 120 tablet, Rfl: 0    oxyCODONE-acetaminophen (PERCOCET)  mg per tablet, Take 1 tablet by mouth 4 (four) times daily as needed for pain... May cause drowsiness (Patient not taking: Reported on 4/21/2025), Disp: 120 tablet, Rfl: 0    oxyCODONE-acetaminophen (PERCOCET)  mg per tablet, Take 1 tablet by mouth 4 (four) times daily as needed. (Patient not taking: Reported on 4/21/2025), Disp: 120 tablet, Rfl: 0    oxyCODONE-acetaminophen (PERCOCET)  mg per tablet, Take 1 tablet by mouth 4 (four) times daily as needed for pain ... may cause drowsiness, Disp: 120 tablet, Rfl: 0    oxyCODONE-acetaminophen (PERCOCET)   mg per tablet, Take 1 tablet by mouth 4 (four) times daily as needed., Disp: 120 tablet, Rfl: 0    pregabalin (LYRICA) 150 MG capsule, Take 1 capsule (150 mg total) by mouth 3 (three) times daily., Disp: 90 capsule, Rfl: 5    tadalafiL (CIALIS) 20 MG Tab, TAKE ONE (1) TABLET (20 MG TOTAL) BY MOUTH DAILY AS NEEDED., Disp: 10 tablet, Rfl: 5

## 2025-05-29 NOTE — TELEPHONE ENCOUNTER
Returned call to pt, pt coming into clinic to be seen today.     Copied from CRM #[id number]. Topic: General Inquiry - Patient Advice  >> May 29, 2025  9:55 AM Desirae wrote:  Who Called: Evin Mtz    He is requesting to speak to someone regarding a rash that popped up on his hand. I offered an appointment but he refused    Patient's Preferred Phone Number on File: [phone]

## 2025-05-31 LAB — MICROORGANISM SPEC CULT: NORMAL

## 2025-06-02 ENCOUNTER — RESULTS FOLLOW-UP (OUTPATIENT)
Dept: DERMATOLOGY | Facility: CLINIC | Age: 60
End: 2025-06-02

## 2025-06-02 ENCOUNTER — TELEPHONE (OUTPATIENT)
Dept: DERMATOLOGY | Facility: CLINIC | Age: 60
End: 2025-06-02
Payer: COMMERCIAL

## 2025-06-12 ENCOUNTER — OFFICE VISIT (OUTPATIENT)
Dept: DERMATOLOGY | Facility: CLINIC | Age: 60
End: 2025-06-12
Payer: COMMERCIAL

## 2025-06-12 DIAGNOSIS — L28.1 PRURIGO NODULARIS: Primary | ICD-10-CM

## 2025-06-12 RX ORDER — CLINDAMYCIN PHOSPHATE 10 MG/G
GEL TOPICAL 2 TIMES DAILY
Qty: 60 G | Refills: 6 | Status: SHIPPED | OUTPATIENT
Start: 2025-06-12

## 2025-06-12 NOTE — PROGRESS NOTES
[facility]   Chanell Santiago MD    Patient Name: Evin Mtz  Patient YOB: 1965   Date of Service: 6/12/25    CC: Follow-up Biopsy results     HPI: Evin Mtz is a 60 y.o. male here today for follow-up of biopsy results, last seen 05/29/25.  Previous treatments include BX.  Overall, the Biopsy is improved.  Treatment plan was followed as directed.    Past Medical History:   Diagnosis Date    GERD (gastroesophageal reflux disease)     Hypertension     Lymphedema      Past Surgical History:   Procedure Laterality Date    ANTERIOR LUMBAR INTERBODY FUSION (ALIF) N/A 10/5/2021    Procedure: L4-L5 anterior lumbar interbody fusion;  Surgeon: Jayden Mtz MD;  Location: [facility] OR;  Service: Neurosurgery;  Laterality: N/A;    ILIAC ARTERY STENT      JOINT REPLACEMENT Right     tka    LAPAROSCOPIC REPAIR OF HIATAL HERNIA      ROBOTIC FUSION,SPINE,LUMBAR,TLIF N/A 11/10/2022    Procedure: ROBOTIC FUSION,SPINE,LUMBAR,TLIF; [facility] OR 3A, 11/10/22 first case; Procedure: L3-S1 decompression and fusion, L5-S1 TLIF; Instrumentation: Medtronic; Marino table, Equipment: C arm, O arm, Robot; Neuro-Monitoring: Yes (EMG, SSEP, MEP);  Surgeon: Seb Banegas MD;  Location: [facility];  Service: Neurosurgery;  Laterality: N/A;    SINUS SURGERY       Review of patient's allergies indicates:   Allergen Reactions    Aspirin Other (See Comments)     Gi symptoms-burning    Upsets his stomach     Current Medications[1]    ROS: A focused review of systems was obtained and negative.     Exam: A focused skin exam was performed. All areas examined were normal except as mentioned in the assessment and plan below.  General Appearance of the patient is well developed and well nourished.  Orientation: alert and oriented x 3.  Mood and affect: pleasant.    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    Plan:   Medications Ordered This Encounter   Medications    clindamycin phosphate 1% 1 % gel      Sig: Apply topically to affected area 2 (two) times daily.     Dispense:  60 g     Refill:  6       Prurigo Nodularis  - erythematous nodules with central erosions located on the forearms  Severity: severe  Estimated nodule count: 20  Status: Inadequately controlled     Plan: Counseling  I counseled the patient regarding the following:  Skin care: Recommend trimming nails short, anti-itch lotions such as Sarna, topical steroids and antihistamines.  Expectations: Prurigo Nodularis is a self-inflicted lesion that results from picking or rubbing the same spot of skin over and over again. If the itch-scratch cycle is broken, the lesions will resolve.  Contact office if: Prurigo Nodularis worsens, or if itching is accompanied by constitutional symptoms.    - reviewed path which was consistent with med eruption to nemoluvio.  Improving after d/c nemoluvio.   - discussed naltrexone or physical barriers but patient declines treatment at this time  -continue clindamycin gel         Follow up if symptoms worsen or fail to improve.    Chanell Santiago MD         [1]   Current Outpatient Medications:     albuterol (VENTOLIN HFA) 90 mcg/actuation inhaler, Inhale 2 puffs into the lungs every 6 (six) hours as needed for Wheezing. Rescue, Disp: 8.5 g, Rfl: 3    apixaban (ELIQUIS) 2.5 mg Tab, Take 1 tablet by mouth in the morning and 1 tablet before bedtime., Disp: 60 tablet, Rfl: 3    ascorbic acid, vitamin C, (VITAMIN C) 1000 MG tablet, Vitamin C 1000 MG Oral Tablet QTY: 0 tablet Days: 0 Refills: 0  Written: 01/03/23 Patient Instructions: qd, Disp: , Rfl:     baclofen (LIORESAL) 10 MG tablet, Take 1 tablet by mouth 3 times a day, Disp: 270 tablet, Rfl: 1    clindamycin phosphate 1% 1 % gel, Apply topically to affected area 2 (two) times daily., Disp: 60 g, Rfl: 6    cyclobenzaprine (FLEXERIL) 10 MG tablet, TAKE ONE (1) TABLET  BY MOUTH THREE (3)  TIMES DAILY AS NEEDED FOR MUSCLE SPASMS., Disp: 30 tablet, Rfl: 2    desonide (DESOWEN)  0.05 % cream, Apply topically 2 (two) times a day tapering with improvement, Disp: 60 g, Rfl: 5    dextroamphetamine-amphetamine (ADDERALL) 20 mg tablet, TAKE 1 TABLET BY MOUTH ONCE IN THE MORNING, 1 TABLET AT NOON AND 1/2 TABLET AT 4 PM, Disp: 75 tablet, Rfl: 0    dextroamphetamine-amphetamine (ADDERALL) 20 mg tablet, Take one tablet by mouth in the morning, one tablet at noon, and one-half tablet at 4 in the evening (Patient not taking: Reported on 4/21/2025), Disp: 75 tablet, Rfl: 0    dextroamphetamine-amphetamine (ADDERALL) 20 mg tablet, Take 1 tablet by mouth in morning, 1 tablet at noon, and 1/2 tablet at 4PM (Patient not taking: Reported on 4/21/2025), Disp: 75 tablet, Rfl: 0    dextroamphetamine-amphetamine (ADDERALL) 20 mg tablet, Take one tablet by mouth in the morning, one at noon, and one-half tablet at 4 in the evening (Patient not taking: Reported on 4/21/2025), Disp: 75 tablet, Rfl: 0    dextroamphetamine-amphetamine (ADDERALL) 20 mg tablet, Take one tablet by mouth in the morning and at noon, and take one-half tablet at 4 in the evening, Disp: 75 tablet, Rfl: 0    dextroamphetamine-amphetamine (ADDERALL) 20 mg tablet, Take one tablet by mouth in the morning and noon, and one-half tablet at four in the evening, Disp: 75 tablet, Rfl: 0    dextroamphetamine-amphetamine (ADDERALL) 20 mg tablet, Take one tablet by mouth in the morning and noon, and one-half tablet at four in the evening, Disp: 75 tablet, Rfl: 0    [START ON 7/3/2025] dextroamphetamine-amphetamine (ADDERALL) 20 mg tablet, Take one tablet by mouth in the morning and noon, and one-half tablet at four in the evening, Disp: 75 tablet, Rfl: 0    diclofenac sodium (SOLARAZE) 3 % gel, Apply 2 grams topically to the affected area three times a day as needed for 30 day(s), Disp: 180 g, Rfl: 5    DULoxetine (CYMBALTA) 30 MG capsule, Take 1 Capsule by mouth daily, Disp: 30 capsule, Rfl: 6    DULoxetine (CYMBALTA) 30 MG capsule, Take 1 capsule (30 mg  total) by mouth once daily., Disp: 30 capsule, Rfl: 6    esomeprazole (NEXIUM) 40 MG capsule, Take 1 capsule (40 mg total) by mouth every morning., Disp: 90 capsule, Rfl: 2    fluticasone propionate (FLONASE) 50 mcg/actuation nasal spray, Inhale 1 spray in each nostril once daily, Disp: 16 g, Rfl: 3    furosemide (LASIX) 40 MG tablet, Take 1 tablet (40 mg total) by mouth once daily. (Patient not taking: Reported on 4/21/2025), Disp: 90 tablet, Rfl: 3    lisinopriL 10 MG tablet, Take 1 tablet by mouth every morning. (Patient not taking: Reported on 4/21/2025), Disp: , Rfl:     oxyCODONE-acetaminophen (PERCOCET)  mg per tablet, Take 1 tablet by mouth 4 (four) times daily as needed for pain... May cause drowsiness, Disp: 120 tablet, Rfl: 0    oxyCODONE-acetaminophen (PERCOCET)  mg per tablet, Take 1 tablet by mouth 4 (four) times daily as needed. (Patient not taking: Reported on 4/21/2025), Disp: 120 tablet, Rfl: 0    oxyCODONE-acetaminophen (PERCOCET)  mg per tablet, Take 1 tablet by mouth 4 (four) times daily as needed for pain... May cause drowsiness (Patient not taking: Reported on 4/21/2025), Disp: 120 tablet, Rfl: 0    oxyCODONE-acetaminophen (PERCOCET)  mg per tablet, Take 1 tablet by mouth 4 (four) times daily as needed. (Patient not taking: Reported on 4/21/2025), Disp: 120 tablet, Rfl: 0    oxyCODONE-acetaminophen (PERCOCET)  mg per tablet, Take 1 tablet by mouth 4 (four) times daily as needed for pain... May cause drowsiness (Patient not taking: Reported on 4/21/2025), Disp: 120 tablet, Rfl: 0    oxyCODONE-acetaminophen (PERCOCET)  mg per tablet, Take 1 tablet by mouth 4 (four) times daily as needed. (Patient not taking: Reported on 4/21/2025), Disp: 120 tablet, Rfl: 0    oxyCODONE-acetaminophen (PERCOCET)  mg per tablet, Take 1 tablet by mouth 4 (four) times daily as needed for pain ... may cause drowsiness, Disp: 120 tablet, Rfl: 0    oxyCODONE-acetaminophen  (PERCOCET)  mg per tablet, Take 1 tablet by mouth 4 (four) times daily as needed., Disp: 120 tablet, Rfl: 0    pregabalin (LYRICA) 150 MG capsule, Take 1 capsule (150 mg total) by mouth 3 (three) times daily., Disp: 90 capsule, Rfl: 5    tadalafiL (CIALIS) 20 MG Tab, TAKE ONE (1) TABLET (20 MG TOTAL) BY MOUTH DAILY AS NEEDED., Disp: 10 tablet, Rfl: 5

## 2025-07-01 ENCOUNTER — HOSPITAL ENCOUNTER (EMERGENCY)
Facility: HOSPITAL | Age: 60
Discharge: HOME | End: 2025-07-01
Payer: COMMERCIAL

## 2025-07-01 VITALS
DIASTOLIC BLOOD PRESSURE: 72 MMHG | HEIGHT: 69 IN | TEMPERATURE: 98 F | HEART RATE: 75 BPM | OXYGEN SATURATION: 99 % | WEIGHT: 219 LBS | SYSTOLIC BLOOD PRESSURE: 150 MMHG | RESPIRATION RATE: 19 BRPM | BODY MASS INDEX: 32.44 KG/M2

## 2025-07-01 DIAGNOSIS — J40 BRONCHITIS: Primary | ICD-10-CM

## 2025-07-01 DIAGNOSIS — J06.9 UPPER RESPIRATORY TRACT INFECTION, UNSPECIFIED TYPE: ICD-10-CM

## 2025-07-01 LAB
ALBUMIN SERPL BCP-MCNC: 3.4 G/DL (ref 3.4–4.8)
ALBUMIN/GLOB SERPL: 0.9 {RATIO}
ALP SERPL-CCNC: 75 U/L (ref 40–150)
ALT SERPL W P-5'-P-CCNC: 26 U/L
ANION GAP SERPL CALCULATED.3IONS-SCNC: 10 MMOL/L (ref 7–16)
AST SERPL W P-5'-P-CCNC: 36 U/L (ref 11–45)
BASOPHILS # BLD AUTO: 0.09 K/UL (ref 0–0.2)
BASOPHILS NFR BLD AUTO: 1.4 % (ref 0–1)
BILIRUB SERPL-MCNC: 0.2 MG/DL
BUN SERPL-MCNC: 12 MG/DL (ref 8–26)
BUN/CREAT SERPL: 13 (ref 6–20)
CALCIUM SERPL-MCNC: 8.5 MG/DL (ref 8.8–10)
CHLORIDE SERPL-SCNC: 109 MMOL/L (ref 98–107)
CO2 SERPL-SCNC: 26 MMOL/L (ref 23–31)
CREAT SERPL-MCNC: 0.94 MG/DL (ref 0.72–1.25)
DIFFERENTIAL METHOD BLD: ABNORMAL
EGFR (NO RACE VARIABLE) (RUSH/TITUS): 92 ML/MIN/1.73M2
EOSINOPHIL # BLD AUTO: 0.82 K/UL (ref 0–0.5)
EOSINOPHIL NFR BLD AUTO: 13 % (ref 1–4)
ERYTHROCYTE [DISTWIDTH] IN BLOOD BY AUTOMATED COUNT: 14 % (ref 11.5–14.5)
GLOBULIN SER-MCNC: 3.6 G/DL (ref 2–4)
GLUCOSE SERPL-MCNC: 96 MG/DL (ref 82–115)
HCT VFR BLD AUTO: 36.4 % (ref 40–54)
HGB BLD-MCNC: 12 G/DL (ref 13.5–18)
IMM GRANULOCYTES # BLD AUTO: 0.03 K/UL (ref 0–0.04)
IMM GRANULOCYTES NFR BLD: 0.5 % (ref 0–0.4)
LYMPHOCYTES # BLD AUTO: 2.04 K/UL (ref 1–4.8)
LYMPHOCYTES NFR BLD AUTO: 32.4 % (ref 27–41)
MCH RBC QN AUTO: 32.1 PG (ref 27–31)
MCHC RBC AUTO-ENTMCNC: 33 G/DL (ref 32–36)
MCV RBC AUTO: 97.3 FL (ref 80–96)
MONOCYTES # BLD AUTO: 0.6 K/UL (ref 0–0.8)
MONOCYTES NFR BLD AUTO: 9.5 % (ref 2–6)
MPC BLD CALC-MCNC: 9.7 FL (ref 9.4–12.4)
NEUTROPHILS # BLD AUTO: 2.72 K/UL (ref 1.8–7.7)
NEUTROPHILS NFR BLD AUTO: 43.2 % (ref 53–65)
NRBC # BLD AUTO: 0 X10E3/UL
NRBC, AUTO (.00): 0 %
PLATELET # BLD AUTO: 284 K/UL (ref 150–400)
POTASSIUM SERPL-SCNC: 3.6 MMOL/L (ref 3.5–5.1)
PROT SERPL-MCNC: 7 G/DL (ref 5.8–7.6)
RBC # BLD AUTO: 3.74 M/UL (ref 4.6–6.2)
SODIUM SERPL-SCNC: 141 MMOL/L (ref 136–145)
WBC # BLD AUTO: 6.3 K/UL (ref 4.5–11)

## 2025-07-01 PROCEDURE — 36415 COLL VENOUS BLD VENIPUNCTURE: CPT | Performed by: NURSE PRACTITIONER

## 2025-07-01 PROCEDURE — 85025 COMPLETE CBC W/AUTO DIFF WBC: CPT | Performed by: NURSE PRACTITIONER

## 2025-07-01 PROCEDURE — 99284 EMERGENCY DEPT VISIT MOD MDM: CPT | Mod: 25

## 2025-07-01 PROCEDURE — 80053 COMPREHEN METABOLIC PANEL: CPT | Performed by: NURSE PRACTITIONER

## 2025-07-01 PROCEDURE — 25000242 PHARM REV CODE 250 ALT 637 W/ HCPCS: Performed by: NURSE PRACTITIONER

## 2025-07-01 PROCEDURE — 63700000 PHARM REV CODE 250 ALT 637 W/O HCPCS: Performed by: NURSE PRACTITIONER

## 2025-07-01 PROCEDURE — 25000003 PHARM REV CODE 250: Performed by: NURSE PRACTITIONER

## 2025-07-01 RX ORDER — OXYCODONE AND ACETAMINOPHEN 5; 325 MG/1; MG/1
2 TABLET ORAL
Refills: 0 | Status: COMPLETED | OUTPATIENT
Start: 2025-07-01 | End: 2025-07-01

## 2025-07-01 RX ORDER — IPRATROPIUM BROMIDE AND ALBUTEROL SULFATE 2.5; .5 MG/3ML; MG/3ML
3 SOLUTION RESPIRATORY (INHALATION) EVERY 6 HOURS PRN
Qty: 90 ML | Refills: 0 | Status: SHIPPED | OUTPATIENT
Start: 2025-07-01 | End: 2026-07-01

## 2025-07-01 RX ORDER — AZITHROMYCIN 250 MG/1
500 TABLET, FILM COATED ORAL
Status: COMPLETED | OUTPATIENT
Start: 2025-07-01 | End: 2025-07-01

## 2025-07-01 RX ORDER — AZITHROMYCIN 250 MG/1
TABLET, FILM COATED ORAL
Qty: 6 TABLET | Refills: 0 | Status: SHIPPED | OUTPATIENT
Start: 2025-07-01 | End: 2025-07-07

## 2025-07-01 RX ORDER — ALBUTEROL SULFATE 0.83 MG/ML
2.5 SOLUTION RESPIRATORY (INHALATION)
Status: COMPLETED | OUTPATIENT
Start: 2025-07-01 | End: 2025-07-01

## 2025-07-01 RX ADMIN — AZITHROMYCIN DIHYDRATE 500 MG: 250 TABLET ORAL at 10:07

## 2025-07-01 RX ADMIN — ALBUTEROL SULFATE 2.5 MG: 2.5 SOLUTION RESPIRATORY (INHALATION) at 10:07

## 2025-07-01 RX ADMIN — OXYCODONE HYDROCHLORIDE AND ACETAMINOPHEN 2 TABLET: 5; 325 TABLET ORAL at 10:07

## 2025-07-01 NOTE — Clinical Note
"Evin Askewhari Mtz was seen and treated in our emergency department on 7/1/2025.  He may return to work on 07/04/2025.       If you have any questions or concerns, please don't hesitate to call.      Eloy White FNP"

## 2025-07-02 NOTE — DISCHARGE INSTRUCTIONS
Zithromax And DuoNeb as prescribed and directed.  Follow up with your primary care provider in 2 days.  Drink plenty of water to stay hydrated.  Stop drinking carbonated beverages.  Return to the emergency department for any increase in symptoms or for any other new or worrisome symptoms.

## 2025-07-02 NOTE — ED PROVIDER NOTES
"Encounter Date: 7/1/2025       History     Chief Complaint   Patient presents with    Cough     Pt been coughing for a couple of days. It has been productive and pt has been weak.     Nausea     60 year old male presents to the emergency department to be evaluated for a "heavy" cough that started several days ago.  He states the cough has increased in intensity, but decreased in frequency.  Last night, he states he coughed up thick sputum, but swallowed it in his sleep.  He woke to the swallowing sensation and could tell that it was big.  He did not look at it.  Denies fever or vomiting.  Endorses mild nausea and constipation.      The history is provided by the patient and the spouse.   URI  The primary symptoms include fatigue, cough, abdominal pain, nausea, myalgias and arthralgias. Primary symptoms do not include fever, headaches, ear pain, sore throat, swollen glands, wheezing, vomiting or rash. The current episode started several days ago. This is a recurrent problem. The problem has not changed since onset.  The fatigue began 2 days ago. The fatigue has been unchanged since its onset.   The cough began 3 to 5 days ago. The cough is productive. There is nondescript sputum produced.   The abdominal pain is generalized. The abdominal pain does not radiate.   Nausea began yesterday. The nausea is exacerbated by food.   Myalgias began more than 1 week ago. The myalgias have been unchanged since their onset. The myalgias are present in the hips. The myalgias are aching. The myalgias are not associated with weakness, tenderness or swelling. The myalgia pain is at a severity of 8/10.     Review of patient's allergies indicates:   Allergen Reactions    Aspirin Other (See Comments)     Gi symptoms-burning    Upsets his stomach     Past Medical History:   Diagnosis Date    GERD (gastroesophageal reflux disease)     Hypertension     Lymphedema      Past Surgical History:   Procedure Laterality Date    ANTERIOR LUMBAR " INTERBODY FUSION (ALIF) N/A 10/5/2021    Procedure: L4-L5 anterior lumbar interbody fusion;  Surgeon: Jayden Mtz MD;  Location: [facility];  Service: Neurosurgery;  Laterality: N/A;    ILIAC ARTERY STENT      JOINT REPLACEMENT Right     tka    LAPAROSCOPIC REPAIR OF HIATAL HERNIA      ROBOTIC FUSION,SPINE,LUMBAR,TLIF N/A 11/10/2022    Procedure: ROBOTIC FUSION,SPINE,LUMBAR,TLIF; [facility] OR 3A, 11/10/22 first case; Procedure: L3-S1 decompression and fusion, L5-S1 TLIF; Instrumentation: Medtronic; Marino table, Equipment: C arm, O arm, Robot; Neuro-Monitoring: Yes (EMG, SSEP, MEP);  Surgeon: Seb Banegas MD;  Location: [facility] OR;  Service: Neurosurgery;  Laterality: N/A;    SINUS SURGERY       Family History   Problem Relation Name Age of Onset    No Known Problems Mother      Hypertension Father      Diabetes Sister      No Known Problems Brother      No Known Problems Maternal Aunt      No Known Problems Maternal Uncle      No Known Problems Paternal Aunt      No Known Problems Paternal Uncle      No Known Problems Maternal Grandmother      No Known Problems Maternal Grandfather      No Known Problems Paternal Grandmother      No Known Problems Paternal Grandfather       Social History[1]  Review of Systems   Constitutional:  Positive for fatigue. Negative for fever.   HENT: Negative.  Negative for ear pain and sore throat.    Eyes: Negative.    Respiratory:  Positive for cough. Negative for shortness of breath and wheezing.    Cardiovascular:  Negative for chest pain and palpitations.   Gastrointestinal:  Positive for abdominal pain, constipation and nausea. Negative for diarrhea and vomiting.   Genitourinary: Negative.    Musculoskeletal:  Positive for arthralgias and myalgias.   Skin:  Negative for rash.   Neurological:  Negative for weakness and headaches.   Hematological:  Bruises/bleeds easily.   Psychiatric/Behavioral: Negative.         Physical Exam     Initial Vitals [07/01/25 2012]   BP Pulse  Resp Temp SpO2   (!) 150/72 64 17 97.8 °F (36.6 °C) 99 %      MAP       --         Physical Exam    Vitals reviewed.  Constitutional: He appears well-developed and well-nourished. He is not diaphoretic. No distress.   HENT:   Head: Normocephalic and atraumatic.   Right Ear: External ear normal.   Left Ear: External ear normal.   Nose: Nose normal. Mouth/Throat: Oropharynx is clear and moist. No oropharyngeal exudate.   Eyes: EOM are normal. Pupils are equal, round, and reactive to light.   Neck: Neck supple. No tracheal deviation present. No JVD present.   Normal range of motion.  Cardiovascular:  Normal rate, regular rhythm and intact distal pulses.           Pulmonary/Chest: No stridor. He has wheezes (mild, scattered).   Abdominal: Abdomen is soft. Bowel sounds are normal. He exhibits no distension. There is abdominal tenderness (mild, generalized).   Musculoskeletal:         General: Tenderness (bilateral hips) present.      Cervical back: Normal range of motion and neck supple.     Neurological: He is alert and oriented to person, place, and time. He has normal strength. No cranial nerve deficit or sensory deficit. GCS score is 15. GCS eye subscore is 4. GCS verbal subscore is 5. GCS motor subscore is 6.   Skin: Skin is warm and dry. Capillary refill takes less than 2 seconds.   Psychiatric: He has a normal mood and affect. Thought content normal.         Medical Screening Exam   See Full Note    ED Course   Procedures  Labs Reviewed   COMPREHENSIVE METABOLIC PANEL - Abnormal       Result Value    Sodium 141      Potassium 3.6      Chloride 109 (*)     CO2 26      Anion Gap 10      Glucose 96      BUN 12      Creatinine 0.94      BUN/Creatinine Ratio 13      Calcium 8.5 (*)     Total Protein 7.0      Albumin 3.4      Globulin 3.6      A/G Ratio 0.9      Bilirubin, Total 0.2      Alk Phos 75      ALT 26      AST 36      eGFR 92     CBC WITH DIFFERENTIAL - Abnormal    WBC 6.30      RBC 3.74 (*)     Hemoglobin  "12.0 (*)     Hematocrit 36.4 (*)     MCV 97.3 (*)     MCH 32.1 (*)     MCHC 33.0      RDW 14.0      Platelet Count 284      MPV 9.7      Neutrophils % 43.2 (*)     Lymphocytes % 32.4      Monocytes % 9.5 (*)     Eosinophils % 13.0 (*)     Basophils % 1.4 (*)     Immature Granulocytes % 0.5 (*)     nRBC, Auto 0.0      Neutrophils, Abs 2.72      Lymphocytes, Absolute 2.04      Monocytes, Absolute 0.60      Eosinophils, Absolute 0.82 (*)     Basophils, Absolute 0.09      Immature Granulocytes, Absolute 0.03      nRBC, Absolute 0.00      Diff Type Auto     CBC W/ AUTO DIFFERENTIAL    Narrative:     The following orders were created for panel order CBC auto differential.  Procedure                               Abnormality         Status                     ---------                               -----------         ------                     CBC with Differential[[id number]]       Abnormal            Final result                 Please view results for these tests on the individual orders.          Imaging Results              XR ABDOMEN, ACUTE 2 OR MORE VIEWS WITH CHEST (In process)                      Medications   albuterol nebulizer solution 2.5 mg (2.5 mg Nebulization Given 7/1/25 2242)   azithromycin tablet 500 mg (500 mg Oral Given 7/1/25 2241)   oxyCODONE-acetaminophen 5-325 mg per tablet 2 tablet (2 tablets Oral Given 7/1/25 2241)     Medical Decision Making  60 year old male presents to the emergency department to be evaluated for a "heavy" cough that started several days ago.  He states the cough has increased in intensity, but decreased in frequency.  Last night, he states he coughed up thick sputum, but swallowed it in his sleep.  He woke to the swallowing sensation and could tell that it was big.  He did not look at it.  Denies fever or vomiting.  Endorses mild nausea and constipation.      Labs ordered and reviewed.  Abdominal and chest xray ordered and reviewed with Dr. Sarkar. Mild upper respiratory " "infection.    Patient has severe hip pain due to "bone on bone" and states he knows he needs hip replacements.  Has complaint of pain in his hips and upper legs, requesting pain medication.  Treated with Percocet 5-325 x 2 tablets, as this is his home medication.     Diagnosis: Upper respiratory infection with cough  Treated in the emergency department with albuterol nebulizer and first dose of azithromycin.  Prescribed azithromycin and Duoneb for home use.  Encouraged increase in water and electrolyte drink intake.        Amount and/or Complexity of Data Reviewed  Labs: ordered.  Radiology: ordered.    Risk  Prescription drug management.                                      Clinical Impression:   Final diagnoses:  [J40] Bronchitis (Primary)  [J06.9] Upper respiratory tract infection, unspecified type        ED Disposition Condition    Discharge Stable          ED Prescriptions       Medication Sig Dispense Start Date End Date Auth. Provider    azithromycin (Z-TIM) 250 MG tablet Take 1 tablet (250 mg total) by mouth once daily. Take first 2 tablets together, then 1 every day until finished. 6 tablet 7/1/2025 -- Eloy White FNP    albuterol-ipratropium (DUO-NEB) 2.5 mg-0.5 mg/3 mL nebulizer solution Take 3 mLs by nebulization every 6 (six) hours as needed for Wheezing. Rescue 75 mL 7/1/2025 7/1/2026 Eloy White FNP          Follow-up Information    None              [1]   Social History  Tobacco Use    Smoking status: Never    Smokeless tobacco: Never   Substance Use Topics    Alcohol use: Never    Drug use: Never        Eloy White FNP  07/02/25 0051    "

## 2025-07-14 ENCOUNTER — OFFICE VISIT (OUTPATIENT)
Dept: DERMATOLOGY | Facility: CLINIC | Age: 60
End: 2025-07-14
Payer: COMMERCIAL

## 2025-07-14 VITALS — BODY MASS INDEX: 32.43 KG/M2 | WEIGHT: 218.94 LBS | HEIGHT: 69 IN

## 2025-07-14 DIAGNOSIS — L28.1 PRURIGO NODULARIS: Primary | ICD-10-CM

## 2025-07-14 PROCEDURE — 4010F ACE/ARB THERAPY RXD/TAKEN: CPT | Mod: ,,, | Performed by: DERMATOLOGY

## 2025-07-14 PROCEDURE — 1159F MED LIST DOCD IN RCRD: CPT | Mod: ,,, | Performed by: DERMATOLOGY

## 2025-07-14 PROCEDURE — 99214 OFFICE O/P EST MOD 30 MIN: CPT | Mod: ,,, | Performed by: DERMATOLOGY

## 2025-07-14 PROCEDURE — 3008F BODY MASS INDEX DOCD: CPT | Mod: ,,, | Performed by: DERMATOLOGY

## 2025-07-14 PROCEDURE — 1160F RVW MEDS BY RX/DR IN RCRD: CPT | Mod: ,,, | Performed by: DERMATOLOGY

## 2025-07-14 RX ORDER — CLINDAMYCIN PHOSPHATE 10 MG/G
GEL TOPICAL 2 TIMES DAILY
Qty: 60 G | Refills: 6 | Status: SHIPPED | OUTPATIENT
Start: 2025-07-14

## 2025-07-14 RX ORDER — CLOBETASOL PROPIONATE 0.5 MG/G
CREAM TOPICAL
Qty: 60 G | Refills: 3 | Status: SHIPPED | OUTPATIENT
Start: 2025-07-14

## 2025-07-14 NOTE — PROGRESS NOTES
[facility]   Chanell Santiago MD    Patient Name: Evin Mtz  Patient YOB: 1965   Date of Service: 7/14/25    CC: Follow-up Prurigo Nodularis    HPI: Evin Mtz is a 60 y.o. male here today for follow-up of PN, last seen 6/12/2025.  Previous treatments include Clindamycin gel.  Overall, the PN is stable.  Treatment plan was followed as directed.    Past Medical History:   Diagnosis Date    GERD (gastroesophageal reflux disease)     Hypertension     Lymphedema      Past Surgical History:   Procedure Laterality Date    ANTERIOR LUMBAR INTERBODY FUSION (ALIF) N/A 10/5/2021    Procedure: L4-L5 anterior lumbar interbody fusion;  Surgeon: Jayden Mtz MD;  Location: [facility];  Service: Neurosurgery;  Laterality: N/A;    ILIAC ARTERY STENT      JOINT REPLACEMENT Right     tka    LAPAROSCOPIC REPAIR OF HIATAL HERNIA      ROBOTIC FUSION,SPINE,LUMBAR,TLIF N/A 11/10/2022    Procedure: ROBOTIC FUSION,SPINE,LUMBAR,TLIF; [facility] OR 3A, 11/10/22 first case; Procedure: L3-S1 decompression and fusion, L5-S1 TLIF; Instrumentation: Medtronic; Marino table, Equipment: C arm, O arm, Robot; Neuro-Monitoring: Yes (EMG, SSEP, MEP);  Surgeon: Seb Banegas MD;  Location: [facility];  Service: Neurosurgery;  Laterality: N/A;    SINUS SURGERY       Review of patient's allergies indicates:   Allergen Reactions    Aspirin Other (See Comments) and Rash     Gi symptoms-burning    Upsets his stomach    Other Reaction(s): Unknown    Note: GI upset      Upsets his stomach      Gi symptoms-burning  Upsets his stomach     Current Medications[1]    ROS: A focused review of systems was obtained and negative.     Exam: A focused skin exam was performed. All areas examined were normal except as mentioned in the assessment and plan below.  General Appearance of the patient is well developed and well nourished.  Orientation: alert and oriented x 3.  Mood and affect: pleasant.    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    Plan:   Medications Ordered This Encounter   Medications    clindamycin phosphate 1% 1 % gel     Sig: Apply topically to affected area 2 (two) times daily.     Dispense:  60 g     Refill:  6    clobetasoL (TEMOVATE) 0.05 % cream     Sig: Apply to affected area on arms twice daily as needed for flares; tapering with improvement     Dispense:  60 g     Refill:  3     Prurigo Nodularis  - erythematous nodules with central erosions located on the bilateral arms  Severity: moderate  Estimated nodule count: 20  Status: improved but not at treatment goal      Plan: Counseling  I counseled the patient regarding the following:  Skin care: Recommend trimming nails short, anti-itch lotions such as Sarna, topical steroids and antihistamines.  Expectations: Prurigo Nodularis is a self-inflicted lesion that results from picking or rubbing the same spot of skin over and over again. If the itch-scratch cycle is broken, the lesions will resolve.  Contact office if: Prurigo Nodularis worsens, or if itching is accompanied by constitutional symptoms.    - continue clindamycin gel, will add clobetasol     Follow up if symptoms worsen or fail to improve.    Chanell Santiago MD           [1]   Current Outpatient Medications:     albuterol-ipratropium (DUO-NEB) 2.5 mg-0.5 mg/3 mL nebulizer solution, Take 3 mLs by nebulization every 6 (six) hours as needed for Wheezing. Rescue, Disp: 90 mL, Rfl: 0    apixaban (ELIQUIS) 2.5 mg Tab, Take 1 tablet (2.5 mg total) by mouth 2 (two) times a day., Disp: 60 tablet, Rfl: 3    ascorbic acid, vitamin C, (VITAMIN C) 1000 MG tablet, Vitamin C 1000 MG Oral Tablet QTY: 0 tablet Days: 0 Refills: 0  Written: 01/03/23 Patient Instructions: qd, Disp: , Rfl:     baclofen (LIORESAL) 10 MG tablet, Take 1 tablet by mouth three times a day as needed for muscle spasms, Disp: 270 tablet, Rfl: 1    clindamycin phosphate 1% 1 % gel, Apply topically to affected area 2 (two) times daily.,  Disp: 60 g, Rfl: 6    clobetasoL (TEMOVATE) 0.05 % cream, Apply to affected area on arms twice daily as needed for flares; tapering with improvement, Disp: 60 g, Rfl: 3    cyclobenzaprine (FLEXERIL) 10 MG tablet, TAKE ONE (1) TABLET  BY MOUTH THREE (3)  TIMES DAILY AS NEEDED FOR MUSCLE SPASMS., Disp: 30 tablet, Rfl: 2    desonide (DESOWEN) 0.05 % cream, Apply topically 2 (two) times a day tapering with improvement, Disp: 60 g, Rfl: 5    dextroamphetamine-amphetamine (ADDERALL) 20 mg tablet, TAKE 1 TABLET BY MOUTH ONCE IN THE MORNING, 1 TABLET AT NOON AND 1/2 TABLET AT 4 PM, Disp: 75 tablet, Rfl: 0    dextroamphetamine-amphetamine (ADDERALL) 20 mg tablet, Take one tablet by mouth in the morning, one tablet at noon, and one-half tablet at 4 in the evening (Patient not taking: Reported on 4/21/2025), Disp: 75 tablet, Rfl: 0    dextroamphetamine-amphetamine (ADDERALL) 20 mg tablet, Take 1 tablet by mouth in morning, 1 tablet at noon, and 1/2 tablet at 4PM (Patient not taking: Reported on 4/21/2025), Disp: 75 tablet, Rfl: 0    dextroamphetamine-amphetamine (ADDERALL) 20 mg tablet, Take one tablet by mouth in the morning, one at noon, and one-half tablet at 4 in the evening (Patient not taking: Reported on 4/21/2025), Disp: 75 tablet, Rfl: 0    dextroamphetamine-amphetamine (ADDERALL) 20 mg tablet, Take one tablet by mouth in the morning and at noon, and take one-half tablet at 4 in the evening, Disp: 75 tablet, Rfl: 0    dextroamphetamine-amphetamine (ADDERALL) 20 mg tablet, Take one tablet by mouth in the morning and noon, and one-half tablet at four in the evening, Disp: 75 tablet, Rfl: 0    dextroamphetamine-amphetamine (ADDERALL) 20 mg tablet, Take one tablet by mouth in the morning and noon, and one-half tablet at four in the evening, Disp: 75 tablet, Rfl: 0    dextroamphetamine-amphetamine (ADDERALL) 20 mg tablet, Take one tablet by mouth in the morning and noon, and one-half tablet at four in the evening, Disp: 75  tablet, Rfl: 0    diclofenac sodium (SOLARAZE) 3 % gel, Apply 2 grams topically to the affected area three times a day as needed for 30 day(s), Disp: 180 g, Rfl: 5    DULoxetine (CYMBALTA) 30 MG capsule, Take 1 Capsule by mouth daily, Disp: 30 capsule, Rfl: 6    DULoxetine (CYMBALTA) 30 MG capsule, Take 1 capsule (30 mg total) by mouth once daily., Disp: 30 capsule, Rfl: 6    esomeprazole (NEXIUM) 40 MG capsule, Take 1 capsule (40 mg total) by mouth every morning., Disp: 90 capsule, Rfl: 2    fluticasone propionate (FLONASE) 50 mcg/actuation nasal spray, Inhale 1 spray in each nostril once daily, Disp: 16 g, Rfl: 3    furosemide (LASIX) 40 MG tablet, Take 1 tablet (40 mg total) by mouth once daily. (Patient not taking: Reported on 4/21/2025), Disp: 90 tablet, Rfl: 3    lisinopriL 10 MG tablet, Take 1 tablet by mouth every morning. (Patient not taking: Reported on 4/21/2025), Disp: , Rfl:     oxyCODONE-acetaminophen (PERCOCET)  mg per tablet, Take 1 tablet by mouth 4 (four) times daily as needed for pain... May cause drowsiness, Disp: 120 tablet, Rfl: 0    oxyCODONE-acetaminophen (PERCOCET)  mg per tablet, Take 1 tablet by mouth 4 (four) times daily as needed. (Patient not taking: Reported on 4/21/2025), Disp: 120 tablet, Rfl: 0    oxyCODONE-acetaminophen (PERCOCET)  mg per tablet, Take 1 tablet by mouth 4 (four) times daily as needed for pain... May cause drowsiness (Patient not taking: Reported on 4/21/2025), Disp: 120 tablet, Rfl: 0    oxyCODONE-acetaminophen (PERCOCET)  mg per tablet, Take 1 tablet by mouth 4 (four) times daily as needed. (Patient not taking: Reported on 4/21/2025), Disp: 120 tablet, Rfl: 0    oxyCODONE-acetaminophen (PERCOCET)  mg per tablet, Take 1 tablet by mouth 4 (four) times daily as needed for pain... May cause drowsiness (Patient not taking: Reported on 4/21/2025), Disp: 120 tablet, Rfl: 0    oxyCODONE-acetaminophen (PERCOCET)  mg per tablet, Take 1  tablet by mouth 4 (four) times daily as needed. (Patient not taking: Reported on 4/21/2025), Disp: 120 tablet, Rfl: 0    oxyCODONE-acetaminophen (PERCOCET)  mg per tablet, Take 1 tablet by mouth 4 (four) times daily as needed for pain ... may cause drowsiness, Disp: 120 tablet, Rfl: 0    oxyCODONE-acetaminophen (PERCOCET)  mg per tablet, Take 1 tablet by mouth 4 (four) times daily as needed... may cause drowsiness, Disp: 120 tablet, Rfl: 0    pregabalin (LYRICA) 150 MG capsule, Take 1 capsule (150 mg total) by mouth 3 (three) times daily., Disp: 90 capsule, Rfl: 5    pregabalin (LYRICA) 150 MG capsule, Take 1 capsule (150 mg total) by mouth 3 (three) times daily., Disp: 90 capsule, Rfl: 5    tadalafiL (CIALIS) 20 MG Tab, TAKE ONE (1) TABLET (20 MG TOTAL) BY MOUTH DAILY AS NEEDED., Disp: 10 tablet, Rfl: 5

## 2025-08-04 ENCOUNTER — OFFICE VISIT (OUTPATIENT)
Dept: ORTHOPEDICS | Facility: CLINIC | Age: 60
End: 2025-08-04
Payer: COMMERCIAL

## 2025-08-04 VITALS
HEART RATE: 79 BPM | SYSTOLIC BLOOD PRESSURE: 114 MMHG | BODY MASS INDEX: 32.29 KG/M2 | DIASTOLIC BLOOD PRESSURE: 64 MMHG | WEIGHT: 218 LBS | OXYGEN SATURATION: 97 % | HEIGHT: 69 IN

## 2025-08-04 DIAGNOSIS — Z96.642 STATUS POST TOTAL HIP REPLACEMENT, LEFT: ICD-10-CM

## 2025-08-04 DIAGNOSIS — Z47.89 ENCOUNTER FOR ORTHOPEDIC FOLLOW-UP CARE: ICD-10-CM

## 2025-08-04 DIAGNOSIS — M16.12 ARTHRITIS OF LEFT HIP: Primary | ICD-10-CM

## 2025-08-04 PROCEDURE — 1159F MED LIST DOCD IN RCRD: CPT | Mod: ,,, | Performed by: ORTHOPAEDIC SURGERY

## 2025-08-04 PROCEDURE — 4010F ACE/ARB THERAPY RXD/TAKEN: CPT | Mod: ,,, | Performed by: ORTHOPAEDIC SURGERY

## 2025-08-04 PROCEDURE — 3008F BODY MASS INDEX DOCD: CPT | Mod: ,,, | Performed by: ORTHOPAEDIC SURGERY

## 2025-08-04 PROCEDURE — 99999 PR PBB SHADOW E&M-EST. PATIENT-LVL V: CPT | Mod: PBBFAC,,, | Performed by: ORTHOPAEDIC SURGERY

## 2025-08-04 PROCEDURE — 99215 OFFICE O/P EST HI 40 MIN: CPT | Mod: PBBFAC | Performed by: ORTHOPAEDIC SURGERY

## 2025-08-04 PROCEDURE — 99214 OFFICE O/P EST MOD 30 MIN: CPT | Mod: S$PBB,,, | Performed by: ORTHOPAEDIC SURGERY

## 2025-08-04 PROCEDURE — 3074F SYST BP LT 130 MM HG: CPT | Mod: ,,, | Performed by: ORTHOPAEDIC SURGERY

## 2025-08-04 PROCEDURE — 3078F DIAST BP <80 MM HG: CPT | Mod: ,,, | Performed by: ORTHOPAEDIC SURGERY

## 2025-08-04 RX ORDER — AMOXICILLIN 875 MG/1
875 TABLET, COATED ORAL EVERY 12 HOURS
Qty: 14 TABLET | Refills: 0 | Status: SHIPPED | OUTPATIENT
Start: 2025-08-04

## 2025-08-04 NOTE — PATIENT INSTRUCTIONS
Your surgery is scheduled at [facility] in [place] for 2025    Pre-Op Testin2025  ___x____ Lab   ___x____ Chest X-ray   ___x____ EKG   ___x____ Total Joint Patients Only--Cardiac/Medical Clearance appointment with Dr. Torito Santos on 2025 at 10:40a.m.  ___x____ (For Total Hips Only) CT Hip at [facility] on 2025 at 3:00p.m.    *[facility] will contact you the day before surgery to give you the arrival time.    *A  is required to provide transportation for you the day of surgery.    *Do NOT eat or drink anything after midnight the night before your surgery.    *Bring all medications in their original bottles.    *Bring anything you may need for an overnight stay.     *Bathe with Hibiclens the night or morning before surgery.    *The morning of your surgery ONLY take blood pressure medications, heart medications, medications for acid reflux, and thyroid medications (the morning dose only).  *Take these medications with a sip of water.    *Do not take Insulin or Diabetic Medications the night before or the morning of your surgery, unless directed otherwise.    *Be sure that you have stopped blood thinners at the appropriate time, as instructed.  (3 days prior to surgery)    *Bring your C-Pap machine if you have one.    *All jewelry and piercings MUST be removed prior to surgery.    *False eye lashes must be removed prior to surgery.    *Any questions regarding co-pays or deductibles with insurance, please contact the [facility] at #[phone].    *For Financial Assistance you may call #[phone] or #[phone].    Thank you for choosing Dr. Twan Serrano for your Orthopedic needs.  We look forward to caring for you. If you have any questions, please contact our office at [phone].

## 2025-08-04 NOTE — PROGRESS NOTES
Patient is here for bilateral hip arthritic changes left greater than right.  He is having some loss of motion of the hip less than 20° of abduction adduction.  He has bone-on-bone in the weight-bearing portion.  We discussed treatment options.  He has failed non operative treatment.  At this time he is at risk for falls we are going to set him up for total hip arthroplasty on left risks and benefits surgery were discussed patient understands risks and benefits we will proceed with surgical intervention.

## 2025-08-05 RX ORDER — CYCLOBENZAPRINE HCL 10 MG
10 TABLET ORAL 3 TIMES DAILY PRN
Qty: 30 TABLET | Refills: 2 | Status: SHIPPED | OUTPATIENT
Start: 2025-08-05

## 2025-08-19 ENCOUNTER — TELEPHONE (OUTPATIENT)
Dept: CARDIOLOGY | Facility: HOSPITAL | Age: 60
End: 2025-08-19
Payer: COMMERCIAL

## 2025-08-20 ENCOUNTER — PATIENT MESSAGE (OUTPATIENT)
Dept: ORTHOPEDICS | Facility: CLINIC | Age: 60
End: 2025-08-20
Payer: COMMERCIAL

## 2025-08-20 ENCOUNTER — HOSPITAL ENCOUNTER (OUTPATIENT)
Dept: RADIOLOGY | Facility: HOSPITAL | Age: 60
Discharge: HOME | End: 2025-08-20
Attending: ORTHOPAEDIC SURGERY
Payer: COMMERCIAL

## 2025-08-20 ENCOUNTER — OFFICE VISIT (OUTPATIENT)
Dept: FAMILY MEDICINE | Facility: CLINIC | Age: 60
End: 2025-08-20
Payer: COMMERCIAL

## 2025-08-20 VITALS
OXYGEN SATURATION: 99 % | HEART RATE: 68 BPM | HEIGHT: 69 IN | SYSTOLIC BLOOD PRESSURE: 130 MMHG | BODY MASS INDEX: 32.27 KG/M2 | TEMPERATURE: 97 F | RESPIRATION RATE: 18 BRPM | WEIGHT: 217.88 LBS | DIASTOLIC BLOOD PRESSURE: 70 MMHG

## 2025-08-20 DIAGNOSIS — I10 ESSENTIAL HYPERTENSION: ICD-10-CM

## 2025-08-20 DIAGNOSIS — I89.0 LYMPHEDEMA: ICD-10-CM

## 2025-08-20 DIAGNOSIS — M16.12 ARTHRITIS OF LEFT HIP: ICD-10-CM

## 2025-08-20 DIAGNOSIS — G89.4 CHRONIC PAIN SYNDROME: ICD-10-CM

## 2025-08-20 DIAGNOSIS — K21.9 GASTROESOPHAGEAL REFLUX DISEASE, UNSPECIFIED WHETHER ESOPHAGITIS PRESENT: ICD-10-CM

## 2025-08-20 DIAGNOSIS — Z01.810 PRE-OPERATIVE CARDIOVASCULAR EXAMINATION: Primary | ICD-10-CM

## 2025-08-20 DIAGNOSIS — Z01.818 PREOP EXAMINATION: Primary | ICD-10-CM

## 2025-08-20 DIAGNOSIS — I87.2 VENOUS INSUFFICIENCY: ICD-10-CM

## 2025-08-20 DIAGNOSIS — Z01.810 PRE-OPERATIVE CARDIOVASCULAR EXAMINATION: ICD-10-CM

## 2025-08-20 PROCEDURE — 73700 CT LOWER EXTREMITY W/O DYE: CPT | Mod: TC,LT

## 2025-08-20 RX ORDER — PANTOPRAZOLE SODIUM 40 MG/1
40 TABLET, DELAYED RELEASE ORAL DAILY
Qty: 90 TABLET | Refills: 3 | Status: SHIPPED | OUTPATIENT
Start: 2025-08-20 | End: 2026-08-20

## (undated) DEVICE — DRAPE FLUORO C ARM 36X30IN

## (undated) DEVICE — CLIP APPLIER LIGATION MED

## (undated) DEVICE — TOWEL OR STERILE BLUE 4/PK 20PK/CS

## (undated) DEVICE — BLADE CARBON SURG SS SZ 15 STERILE

## (undated) DEVICE — FLEXIBLE YANKAUER SUCTION CATH

## (undated) DEVICE — BLADE BOVIE INSULATED COATED 2.75IN

## (undated) DEVICE — SUTURE SILK 0 FSL

## (undated) DEVICE — GOWN SURGICAL SMARTGOWN LEVEL 4 / EXTRA LARGE STERILE

## (undated) DEVICE — SPONGE PEANUT 3/8IN X-RAY DETECTABLE W/COUNT CARD STERILE

## (undated) DEVICE — TUBING SUCTION 5MM STERILE 3/16IN X 12FT

## (undated) DEVICE — NEEDLE SPINAL 18G X 3 1/2IN

## (undated) DEVICE — SUTURE STRATAFIX VIOLET CT-1 45CM

## (undated) DEVICE — FLOSEAL MATRIX HEMOSTATIC 10ML

## (undated) DEVICE — BLADE SURG SS SZ 10

## (undated) DEVICE — SPONGE LAPAROTOMY SURGICAL 1X1 X RAY DETECTABLE

## (undated) DEVICE — GLOVE SURGICAL PROTEXIS PI BLUE SIZE 6.0

## (undated) DEVICE — SYRINGE ASEPTO IRRIGATION BULB 60CC LF DISP

## (undated) DEVICE — Device

## (undated) DEVICE — DRAPE STERI SURGICAL TOWEL 1000

## (undated) DEVICE — FORCEP BAYONET BIPOLAR 10.5 DISP

## (undated) DEVICE — PCD VASOPRESS CALF MEDIUM

## (undated) DEVICE — SYRINGE 20CC LL PLASTIC  BX/48

## (undated) DEVICE — PRINEO SKIN CLOSURE DERMABOND 22CM

## (undated) DEVICE — SPONGE XRAY DETECT 4X4IN PK/10

## (undated) DEVICE — SUTURE BONE WAX (W31G) 2.5 GM

## (undated) DEVICE — SUTURE VICRYL 1 CT-1 UD BR 27IN

## (undated) DEVICE — FILM IOBAN ANTIMICROBL 35X35CM

## (undated) DEVICE — GOWN SURGICAL STERILE LEVEL 3 / XX-LARGE

## (undated) DEVICE — SPONGE LAP STRL 18X18 5/PK

## (undated) DEVICE — SYRINGE 10-12CC LURE -LOK TIP

## (undated) DEVICE — DRAPE COVER C-ARM C-ARMOR 42IN X 74IN DISP STERILE

## (undated) DEVICE — SUTURE STRATAFIX PGA 2-0 26CM

## (undated) DEVICE — GLOVE SURGICAL PROTEXIS PI SIZE 7

## (undated) DEVICE — NEEDLE SPINAL 20G X 3 1/2IN

## (undated) DEVICE — SPONGE GAUZE 4X4 12 PLY STL AMD 10/TRAY

## (undated) DEVICE — POSITIONER HEADREST FOAM 11 X 10 X 6

## (undated) DEVICE — FOAM POSITIONER ARM SURGICAL

## (undated) DEVICE — BAG-A-JET FLUID DISPENSER SYSTEM

## (undated) DEVICE — U-BAR CHESTPACK III

## (undated) DEVICE — SOL IRRIGATION SALINE 0.9% 1000ML BOTTLE

## (undated) DEVICE — GLOVE SURGICAL PROTEXIS PI CLASSIC SIZE 7.5

## (undated) DEVICE — PENCIL HANDSWITCHING ROCKER SW

## (undated) DEVICE — CLEANER TIP BOVIE ELECTROSURG

## (undated) DEVICE — DRAPE MAGNETIC  INSTRUMENT

## (undated) DEVICE — APPLICATOR CHLORAPREP HI-LITE TINTED ORANGE 26ML

## (undated) DEVICE — DRAPE SURGICAL 3/4 SHEET 56 X 77" STERILE"

## (undated) DEVICE — GLOVE SURGICAL PROTEXIS PI SIZE 8.5

## (undated) DEVICE — SUTURE VICRYL 1 VIO CTX 36IN

## (undated) DEVICE — GLOVE SURGICAL PROTEXIS PI BLUE SIZE 8.5

## (undated) DEVICE — GLOVE SURGICAL PROTEXIS PI SIZE 6

## (undated) DEVICE — COUNTER SHARPS FOAM BLOCK MAGNET 20-40

## (undated) DEVICE — MARKER SURGICAL PEN & RULER STERILE

## (undated) DEVICE — CATHETER FOLEY 16FR SILVER 5CC

## (undated) DEVICE — COVER LIGHT HANDLE FLEX PK/2